# Patient Record
Sex: FEMALE | Race: ASIAN | NOT HISPANIC OR LATINO | URBAN - METROPOLITAN AREA
[De-identification: names, ages, dates, MRNs, and addresses within clinical notes are randomized per-mention and may not be internally consistent; named-entity substitution may affect disease eponyms.]

---

## 2017-02-21 ENCOUNTER — INPATIENT (INPATIENT)
Facility: HOSPITAL | Age: 82
LOS: 30 days | Discharge: ANOTHER HOSPITAL | DRG: 949 | End: 2017-03-24
Attending: PSYCHIATRY & NEUROLOGY | Admitting: PSYCHIATRY & NEUROLOGY
Payer: MEDICARE

## 2017-02-21 DIAGNOSIS — I69.351 HEMIPLEGIA AND HEMIPARESIS FOLLOWING CEREBRAL INFARCTION AFFECTING RIGHT DOMINANT SIDE: ICD-10-CM

## 2017-02-21 DIAGNOSIS — R26.9 UNSPECIFIED ABNORMALITIES OF GAIT AND MOBILITY: ICD-10-CM

## 2017-02-21 DIAGNOSIS — T42.6X5A ADVERSE EFFECT OF OTHER ANTIEPILEPTIC AND SEDATIVE-HYPNOTIC DRUGS, INITIAL ENCOUNTER: ICD-10-CM

## 2017-02-21 DIAGNOSIS — D69.59 OTHER SECONDARY THROMBOCYTOPENIA: ICD-10-CM

## 2017-02-21 DIAGNOSIS — Z51.89 ENCOUNTER FOR OTHER SPECIFIED AFTERCARE: ICD-10-CM

## 2017-02-21 DIAGNOSIS — Z48.812 ENCOUNTER FOR SURGICAL AFTERCARE FOLLOWING SURGERY ON THE CIRCULATORY SYSTEM: ICD-10-CM

## 2017-02-21 DIAGNOSIS — I69.392 FACIAL WEAKNESS FOLLOWING CEREBRAL INFARCTION: ICD-10-CM

## 2017-02-21 DIAGNOSIS — N39.0 URINARY TRACT INFECTION, SITE NOT SPECIFIED: ICD-10-CM

## 2017-02-21 DIAGNOSIS — R94.31 ABNORMAL ELECTROCARDIOGRAM [ECG] [EKG]: ICD-10-CM

## 2017-02-21 DIAGNOSIS — K94.29 OTHER COMPLICATIONS OF GASTROSTOMY: ICD-10-CM

## 2017-02-21 DIAGNOSIS — M24.411 RECURRENT DISLOCATION, RIGHT SHOULDER: ICD-10-CM

## 2017-02-21 DIAGNOSIS — R41.4 NEUROLOGIC NEGLECT SYNDROME: ICD-10-CM

## 2017-02-21 DIAGNOSIS — I69.390 APRAXIA FOLLOWING CEREBRAL INFARCTION: ICD-10-CM

## 2017-02-21 DIAGNOSIS — I48.2 CHRONIC ATRIAL FIBRILLATION: ICD-10-CM

## 2017-02-21 DIAGNOSIS — R33.9 RETENTION OF URINE, UNSPECIFIED: ICD-10-CM

## 2017-02-21 DIAGNOSIS — I63.9 CEREBRAL INFARCTION, UNSPECIFIED: ICD-10-CM

## 2017-02-21 DIAGNOSIS — G47.00 INSOMNIA, UNSPECIFIED: ICD-10-CM

## 2017-02-21 DIAGNOSIS — Z43.1 ENCOUNTER FOR ATTENTION TO GASTROSTOMY: ICD-10-CM

## 2017-02-21 DIAGNOSIS — R41.82 ALTERED MENTAL STATUS, UNSPECIFIED: ICD-10-CM

## 2017-02-21 DIAGNOSIS — R45.87 IMPULSIVENESS: ICD-10-CM

## 2017-02-21 DIAGNOSIS — E78.5 HYPERLIPIDEMIA, UNSPECIFIED: ICD-10-CM

## 2017-02-21 DIAGNOSIS — I69.320 APHASIA FOLLOWING CEREBRAL INFARCTION: ICD-10-CM

## 2017-02-21 DIAGNOSIS — I69.391 DYSPHAGIA FOLLOWING CEREBRAL INFARCTION: ICD-10-CM

## 2017-02-21 DIAGNOSIS — I95.1 ORTHOSTATIC HYPOTENSION: ICD-10-CM

## 2017-02-21 DIAGNOSIS — I10 ESSENTIAL (PRIMARY) HYPERTENSION: ICD-10-CM
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
## 2017-02-21 DIAGNOSIS — R45.1 RESTLESSNESS AND AGITATION: ICD-10-CM
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
## 2017-02-21 DIAGNOSIS — I69.319 UNSPECIFIED SYMPTOMS AND SIGNS INVOLVING COGNITIVE FUNCTIONS FOLLOWING CEREBRAL INFARCTION: ICD-10-CM

## 2017-02-21 PROCEDURE — 93010 ELECTROCARDIOGRAM REPORT: CPT

## 2017-02-22 PROCEDURE — 93010 ELECTROCARDIOGRAM REPORT: CPT

## 2017-02-22 PROCEDURE — 99223 1ST HOSP IP/OBS HIGH 75: CPT

## 2017-02-23 PROCEDURE — 90792 PSYCH DIAG EVAL W/MED SRVCS: CPT

## 2017-02-23 PROCEDURE — 99233 SBSQ HOSP IP/OBS HIGH 50: CPT

## 2017-02-24 PROCEDURE — 99233 SBSQ HOSP IP/OBS HIGH 50: CPT

## 2017-02-25 PROCEDURE — 99233 SBSQ HOSP IP/OBS HIGH 50: CPT

## 2017-02-26 PROCEDURE — 99233 SBSQ HOSP IP/OBS HIGH 50: CPT

## 2017-02-27 PROCEDURE — 99233 SBSQ HOSP IP/OBS HIGH 50: CPT

## 2017-02-27 PROCEDURE — 99232 SBSQ HOSP IP/OBS MODERATE 35: CPT

## 2017-02-28 PROCEDURE — 99233 SBSQ HOSP IP/OBS HIGH 50: CPT

## 2017-03-01 PROCEDURE — 99233 SBSQ HOSP IP/OBS HIGH 50: CPT

## 2017-03-01 PROCEDURE — 49465 FLUORO EXAM OF G/COLON TUBE: CPT

## 2017-03-02 PROCEDURE — 99233 SBSQ HOSP IP/OBS HIGH 50: CPT

## 2017-03-03 PROCEDURE — 70450 CT HEAD/BRAIN W/O DYE: CPT | Mod: 26

## 2017-03-03 PROCEDURE — 99233 SBSQ HOSP IP/OBS HIGH 50: CPT

## 2017-03-04 PROCEDURE — 70450 CT HEAD/BRAIN W/O DYE: CPT | Mod: 26

## 2017-03-04 PROCEDURE — 99232 SBSQ HOSP IP/OBS MODERATE 35: CPT

## 2017-03-05 PROCEDURE — 99232 SBSQ HOSP IP/OBS MODERATE 35: CPT

## 2017-03-06 PROCEDURE — 99232 SBSQ HOSP IP/OBS MODERATE 35: CPT

## 2017-03-07 PROCEDURE — 99232 SBSQ HOSP IP/OBS MODERATE 35: CPT

## 2017-03-07 PROCEDURE — 99233 SBSQ HOSP IP/OBS HIGH 50: CPT

## 2017-03-08 PROCEDURE — 99232 SBSQ HOSP IP/OBS MODERATE 35: CPT

## 2017-03-08 PROCEDURE — 99233 SBSQ HOSP IP/OBS HIGH 50: CPT

## 2017-03-08 PROCEDURE — 74230 X-RAY XM SWLNG FUNCJ C+: CPT | Mod: 26

## 2017-03-09 PROCEDURE — 99232 SBSQ HOSP IP/OBS MODERATE 35: CPT

## 2017-03-10 PROCEDURE — 99232 SBSQ HOSP IP/OBS MODERATE 35: CPT

## 2017-03-11 PROCEDURE — 99232 SBSQ HOSP IP/OBS MODERATE 35: CPT

## 2017-03-12 PROCEDURE — 99232 SBSQ HOSP IP/OBS MODERATE 35: CPT

## 2017-03-13 PROCEDURE — 99232 SBSQ HOSP IP/OBS MODERATE 35: CPT

## 2017-03-13 PROCEDURE — 99233 SBSQ HOSP IP/OBS HIGH 50: CPT

## 2017-03-14 PROCEDURE — 99232 SBSQ HOSP IP/OBS MODERATE 35: CPT

## 2017-03-14 PROCEDURE — 99233 SBSQ HOSP IP/OBS HIGH 50: CPT

## 2017-03-15 PROCEDURE — 99233 SBSQ HOSP IP/OBS HIGH 50: CPT

## 2017-03-15 PROCEDURE — 99232 SBSQ HOSP IP/OBS MODERATE 35: CPT

## 2017-03-16 PROCEDURE — 99232 SBSQ HOSP IP/OBS MODERATE 35: CPT

## 2017-03-16 PROCEDURE — 99233 SBSQ HOSP IP/OBS HIGH 50: CPT

## 2017-03-17 PROCEDURE — 99233 SBSQ HOSP IP/OBS HIGH 50: CPT

## 2017-03-17 PROCEDURE — 99232 SBSQ HOSP IP/OBS MODERATE 35: CPT

## 2017-03-18 PROCEDURE — 99232 SBSQ HOSP IP/OBS MODERATE 35: CPT

## 2017-03-18 PROCEDURE — 99233 SBSQ HOSP IP/OBS HIGH 50: CPT

## 2017-03-19 PROCEDURE — 99232 SBSQ HOSP IP/OBS MODERATE 35: CPT

## 2017-03-19 PROCEDURE — 99233 SBSQ HOSP IP/OBS HIGH 50: CPT

## 2017-03-20 PROCEDURE — 99233 SBSQ HOSP IP/OBS HIGH 50: CPT

## 2017-03-20 PROCEDURE — 99232 SBSQ HOSP IP/OBS MODERATE 35: CPT

## 2017-03-21 PROCEDURE — 99232 SBSQ HOSP IP/OBS MODERATE 35: CPT

## 2017-03-21 PROCEDURE — 99233 SBSQ HOSP IP/OBS HIGH 50: CPT

## 2017-03-22 PROCEDURE — 99232 SBSQ HOSP IP/OBS MODERATE 35: CPT

## 2017-03-22 PROCEDURE — 99233 SBSQ HOSP IP/OBS HIGH 50: CPT

## 2017-03-23 PROCEDURE — 99232 SBSQ HOSP IP/OBS MODERATE 35: CPT

## 2017-03-24 ENCOUNTER — INPATIENT (INPATIENT)
Facility: HOSPITAL | Age: 82
LOS: 4 days | Discharge: HOME | DRG: 57 | End: 2017-03-29
Attending: HOSPITALIST | Admitting: HOSPITALIST
Payer: MEDICARE

## 2017-03-24 DIAGNOSIS — F91.9 CONDUCT DISORDER, UNSPECIFIED: ICD-10-CM

## 2017-03-24 DIAGNOSIS — I69.398 OTHER SEQUELAE OF CEREBRAL INFARCTION: ICD-10-CM

## 2017-03-24 DIAGNOSIS — I10 ESSENTIAL (PRIMARY) HYPERTENSION: ICD-10-CM

## 2017-03-24 DIAGNOSIS — Z78.1 PHYSICAL RESTRAINT STATUS: ICD-10-CM

## 2017-03-24 DIAGNOSIS — R13.10 DYSPHAGIA, UNSPECIFIED: ICD-10-CM

## 2017-03-24 DIAGNOSIS — Z79.01 LONG TERM (CURRENT) USE OF ANTICOAGULANTS: ICD-10-CM

## 2017-03-24 DIAGNOSIS — Z93.1 GASTROSTOMY STATUS: ICD-10-CM

## 2017-03-24 DIAGNOSIS — I69.351 HEMIPLEGIA AND HEMIPARESIS FOLLOWING CEREBRAL INFARCTION AFFECTING RIGHT DOMINANT SIDE: ICD-10-CM

## 2017-03-24 DIAGNOSIS — E78.5 HYPERLIPIDEMIA, UNSPECIFIED: ICD-10-CM

## 2017-03-24 DIAGNOSIS — E87.2 ACIDOSIS: ICD-10-CM

## 2017-03-24 DIAGNOSIS — Z79.52 LONG TERM (CURRENT) USE OF SYSTEMIC STEROIDS: ICD-10-CM

## 2017-03-24 DIAGNOSIS — R40.20 UNSPECIFIED COMA: ICD-10-CM

## 2017-03-24 DIAGNOSIS — G40.909 EPILEPSY, UNSPECIFIED, NOT INTRACTABLE, WITHOUT STATUS EPILEPTICUS: ICD-10-CM

## 2017-03-24 DIAGNOSIS — F05 DELIRIUM DUE TO KNOWN PHYSIOLOGICAL CONDITION: ICD-10-CM

## 2017-03-24 DIAGNOSIS — I69.391 DYSPHAGIA FOLLOWING CEREBRAL INFARCTION: ICD-10-CM

## 2017-03-24 DIAGNOSIS — I69.320 APHASIA FOLLOWING CEREBRAL INFARCTION: ICD-10-CM

## 2017-03-24 DIAGNOSIS — I48.0 PAROXYSMAL ATRIAL FIBRILLATION: ICD-10-CM

## 2017-03-24 PROCEDURE — 49465 FLUORO EXAM OF G/COLON TUBE: CPT

## 2017-03-24 PROCEDURE — 85652 RBC SED RATE AUTOMATED: CPT

## 2017-03-24 PROCEDURE — 82607 VITAMIN B-12: CPT

## 2017-03-24 PROCEDURE — 99232 SBSQ HOSP IP/OBS MODERATE 35: CPT

## 2017-03-24 PROCEDURE — 70450 CT HEAD/BRAIN W/O DYE: CPT

## 2017-03-24 PROCEDURE — 83036 HEMOGLOBIN GLYCOSYLATED A1C: CPT

## 2017-03-24 PROCEDURE — 84484 ASSAY OF TROPONIN QUANT: CPT

## 2017-03-24 PROCEDURE — 97760 ORTHOTIC MGMT&TRAING 1ST ENC: CPT

## 2017-03-24 PROCEDURE — 80048 BASIC METABOLIC PNL TOTAL CA: CPT

## 2017-03-24 PROCEDURE — 87086 URINE CULTURE/COLONY COUNT: CPT

## 2017-03-24 PROCEDURE — 86140 C-REACTIVE PROTEIN: CPT

## 2017-03-24 PROCEDURE — 84146 ASSAY OF PROLACTIN: CPT

## 2017-03-24 PROCEDURE — 92507 TX SP LANG VOICE COMM INDIV: CPT

## 2017-03-24 PROCEDURE — 80164 ASSAY DIPROPYLACETIC ACD TOT: CPT

## 2017-03-24 PROCEDURE — 86038 ANTINUCLEAR ANTIBODIES: CPT

## 2017-03-24 PROCEDURE — 81003 URINALYSIS AUTO W/O SCOPE: CPT

## 2017-03-24 PROCEDURE — 97167 OT EVAL HIGH COMPLEX 60 MIN: CPT

## 2017-03-24 PROCEDURE — 70450 CT HEAD/BRAIN W/O DYE: CPT | Mod: 26

## 2017-03-24 PROCEDURE — 93005 ELECTROCARDIOGRAM TRACING: CPT

## 2017-03-24 PROCEDURE — 97110 THERAPEUTIC EXERCISES: CPT

## 2017-03-24 PROCEDURE — 97116 GAIT TRAINING THERAPY: CPT

## 2017-03-24 PROCEDURE — 74230 X-RAY XM SWLNG FUNCJ C+: CPT

## 2017-03-24 PROCEDURE — 97535 SELF CARE MNGMENT TRAINING: CPT

## 2017-03-24 PROCEDURE — 97140 MANUAL THERAPY 1/> REGIONS: CPT

## 2017-03-24 PROCEDURE — 99233 SBSQ HOSP IP/OBS HIGH 50: CPT

## 2017-03-24 PROCEDURE — 85027 COMPLETE CBC AUTOMATED: CPT

## 2017-03-24 PROCEDURE — 97112 NEUROMUSCULAR REEDUCATION: CPT

## 2017-03-24 PROCEDURE — 92611 MOTION FLUOROSCOPY/SWALLOW: CPT

## 2017-03-24 PROCEDURE — 80162 ASSAY OF DIGOXIN TOTAL: CPT

## 2017-03-24 PROCEDURE — 99291 CRITICAL CARE FIRST HOUR: CPT

## 2017-03-24 PROCEDURE — 93010 ELECTROCARDIOGRAM REPORT: CPT

## 2017-03-24 PROCEDURE — 80053 COMPREHEN METABOLIC PANEL: CPT

## 2017-03-24 PROCEDURE — 83605 ASSAY OF LACTIC ACID: CPT

## 2017-03-24 PROCEDURE — 92526 ORAL FUNCTION THERAPY: CPT

## 2017-03-24 PROCEDURE — 80069 RENAL FUNCTION PANEL: CPT

## 2017-03-24 PROCEDURE — 84100 ASSAY OF PHOSPHORUS: CPT

## 2017-03-24 PROCEDURE — C1889: CPT

## 2017-03-24 PROCEDURE — 85025 COMPLETE CBC W/AUTO DIFF WBC: CPT

## 2017-03-24 PROCEDURE — 80076 HEPATIC FUNCTION PANEL: CPT

## 2017-03-24 PROCEDURE — 97530 THERAPEUTIC ACTIVITIES: CPT

## 2017-03-24 PROCEDURE — 82608 B-12 BINDING CAPACITY: CPT

## 2017-03-24 PROCEDURE — 84132 ASSAY OF SERUM POTASSIUM: CPT

## 2017-03-24 PROCEDURE — 82746 ASSAY OF FOLIC ACID SERUM: CPT

## 2017-03-24 PROCEDURE — 97163 PT EVAL HIGH COMPLEX 45 MIN: CPT

## 2017-03-24 PROCEDURE — 93010 ELECTROCARDIOGRAM REPORT: CPT | Mod: 76

## 2017-03-24 PROCEDURE — 83735 ASSAY OF MAGNESIUM: CPT

## 2017-03-24 PROCEDURE — 97124 MASSAGE THERAPY: CPT

## 2017-03-25 PROCEDURE — 99233 SBSQ HOSP IP/OBS HIGH 50: CPT

## 2017-03-26 PROCEDURE — 99233 SBSQ HOSP IP/OBS HIGH 50: CPT

## 2017-03-27 PROCEDURE — 99233 SBSQ HOSP IP/OBS HIGH 50: CPT

## 2017-03-28 PROCEDURE — 99233 SBSQ HOSP IP/OBS HIGH 50: CPT

## 2017-03-29 PROCEDURE — 83605 ASSAY OF LACTIC ACID: CPT

## 2017-03-29 PROCEDURE — 85025 COMPLETE CBC W/AUTO DIFF WBC: CPT

## 2017-03-29 PROCEDURE — 80069 RENAL FUNCTION PANEL: CPT

## 2017-03-29 PROCEDURE — 80164 ASSAY DIPROPYLACETIC ACD TOT: CPT

## 2017-03-29 PROCEDURE — 97161 PT EVAL LOW COMPLEX 20 MIN: CPT

## 2017-03-29 PROCEDURE — 93005 ELECTROCARDIOGRAM TRACING: CPT

## 2017-03-29 PROCEDURE — 85027 COMPLETE CBC AUTOMATED: CPT

## 2017-03-29 PROCEDURE — 99239 HOSP IP/OBS DSCHRG MGMT >30: CPT

## 2017-03-29 PROCEDURE — 84484 ASSAY OF TROPONIN QUANT: CPT

## 2017-03-29 PROCEDURE — 80048 BASIC METABOLIC PNL TOTAL CA: CPT

## 2017-03-29 PROCEDURE — 95812 EEG 41-60 MINUTES: CPT

## 2018-05-13 ENCOUNTER — INPATIENT (INPATIENT)
Facility: HOSPITAL | Age: 83
LOS: 2 days | Discharge: HOME | DRG: 394 | End: 2018-05-16
Attending: INTERNAL MEDICINE | Admitting: INTERNAL MEDICINE
Payer: MEDICARE

## 2018-05-13 VITALS
TEMPERATURE: 98 F | WEIGHT: 100.09 LBS | RESPIRATION RATE: 16 BRPM | SYSTOLIC BLOOD PRESSURE: 126 MMHG | DIASTOLIC BLOOD PRESSURE: 77 MMHG | HEART RATE: 75 BPM | OXYGEN SATURATION: 97 %

## 2018-05-13 DIAGNOSIS — K94.23 GASTROSTOMY MALFUNCTION: ICD-10-CM

## 2018-05-13 DIAGNOSIS — I48.91 UNSPECIFIED ATRIAL FIBRILLATION: ICD-10-CM

## 2018-05-13 DIAGNOSIS — I63.9 CEREBRAL INFARCTION, UNSPECIFIED: ICD-10-CM

## 2018-05-13 LAB
ALBUMIN SERPL ELPH-MCNC: 4 G/DL — SIGNIFICANT CHANGE UP (ref 3.3–5)
ALP SERPL-CCNC: 54 U/L — SIGNIFICANT CHANGE UP (ref 40–120)
ALT FLD-CCNC: 33 U/L — SIGNIFICANT CHANGE UP (ref 10–45)
ANION GAP SERPL CALC-SCNC: 12 MMOL/L — SIGNIFICANT CHANGE UP (ref 5–17)
ANION GAP SERPL CALC-SCNC: 14 MMOL/L — SIGNIFICANT CHANGE UP (ref 5–17)
APTT BLD: 31.5 SEC — SIGNIFICANT CHANGE UP (ref 27.5–37.4)
AST SERPL-CCNC: 47 U/L — HIGH (ref 10–40)
BASOPHILS # BLD AUTO: 0 K/UL — SIGNIFICANT CHANGE UP (ref 0–0.2)
BASOPHILS NFR BLD AUTO: 0.3 % — SIGNIFICANT CHANGE UP (ref 0–2)
BILIRUB SERPL-MCNC: 0.3 MG/DL — SIGNIFICANT CHANGE UP (ref 0.2–1.2)
BLD GP AB SCN SERPL QL: NEGATIVE — SIGNIFICANT CHANGE UP
BUN SERPL-MCNC: 16 MG/DL — SIGNIFICANT CHANGE UP (ref 7–23)
BUN SERPL-MCNC: 17 MG/DL — SIGNIFICANT CHANGE UP (ref 7–23)
CALCIUM SERPL-MCNC: 9.2 MG/DL — SIGNIFICANT CHANGE UP (ref 8.4–10.5)
CALCIUM SERPL-MCNC: 9.6 MG/DL — SIGNIFICANT CHANGE UP (ref 8.4–10.5)
CHLORIDE SERPL-SCNC: 100 MMOL/L — SIGNIFICANT CHANGE UP (ref 96–108)
CHLORIDE SERPL-SCNC: 100 MMOL/L — SIGNIFICANT CHANGE UP (ref 96–108)
CO2 SERPL-SCNC: 25 MMOL/L — SIGNIFICANT CHANGE UP (ref 22–31)
CO2 SERPL-SCNC: 29 MMOL/L — SIGNIFICANT CHANGE UP (ref 22–31)
CREAT SERPL-MCNC: 0.45 MG/DL — LOW (ref 0.5–1.3)
CREAT SERPL-MCNC: 0.48 MG/DL — LOW (ref 0.5–1.3)
EOSINOPHIL # BLD AUTO: 0.1 K/UL — SIGNIFICANT CHANGE UP (ref 0–0.5)
EOSINOPHIL NFR BLD AUTO: 0.7 % — SIGNIFICANT CHANGE UP (ref 0–6)
GLUCOSE SERPL-MCNC: 113 MG/DL — HIGH (ref 70–99)
GLUCOSE SERPL-MCNC: 115 MG/DL — HIGH (ref 70–99)
HCT VFR BLD CALC: 43.6 % — SIGNIFICANT CHANGE UP (ref 34.5–45)
HGB BLD-MCNC: 14.7 G/DL — SIGNIFICANT CHANGE UP (ref 11.5–15.5)
LYMPHOCYTES # BLD AUTO: 2.2 K/UL — SIGNIFICANT CHANGE UP (ref 1–3.3)
LYMPHOCYTES # BLD AUTO: 26.9 % — SIGNIFICANT CHANGE UP (ref 13–44)
MCHC RBC-ENTMCNC: 33.3 PG — SIGNIFICANT CHANGE UP (ref 27–34)
MCHC RBC-ENTMCNC: 33.8 GM/DL — SIGNIFICANT CHANGE UP (ref 32–36)
MCV RBC AUTO: 98.6 FL — SIGNIFICANT CHANGE UP (ref 80–100)
MONOCYTES # BLD AUTO: 0.4 K/UL — SIGNIFICANT CHANGE UP (ref 0–0.9)
MONOCYTES NFR BLD AUTO: 4.8 % — SIGNIFICANT CHANGE UP (ref 2–14)
NEUTROPHILS # BLD AUTO: 5.4 K/UL — SIGNIFICANT CHANGE UP (ref 1.8–7.4)
NEUTROPHILS NFR BLD AUTO: 67.2 % — SIGNIFICANT CHANGE UP (ref 43–77)
PLATELET # BLD AUTO: 194 K/UL — SIGNIFICANT CHANGE UP (ref 150–400)
POTASSIUM SERPL-MCNC: 4.9 MMOL/L — SIGNIFICANT CHANGE UP (ref 3.5–5.3)
POTASSIUM SERPL-MCNC: 5.8 MMOL/L — HIGH (ref 3.5–5.3)
POTASSIUM SERPL-SCNC: 4.9 MMOL/L — SIGNIFICANT CHANGE UP (ref 3.5–5.3)
POTASSIUM SERPL-SCNC: 5.8 MMOL/L — HIGH (ref 3.5–5.3)
PROT SERPL-MCNC: 7.8 G/DL — SIGNIFICANT CHANGE UP (ref 6–8.3)
RBC # BLD: 4.42 M/UL — SIGNIFICANT CHANGE UP (ref 3.8–5.2)
RBC # FLD: 11.1 % — SIGNIFICANT CHANGE UP (ref 10.3–14.5)
RH IG SCN BLD-IMP: POSITIVE — SIGNIFICANT CHANGE UP
RH IG SCN BLD-IMP: POSITIVE — SIGNIFICANT CHANGE UP
SODIUM SERPL-SCNC: 139 MMOL/L — SIGNIFICANT CHANGE UP (ref 135–145)
SODIUM SERPL-SCNC: 141 MMOL/L — SIGNIFICANT CHANGE UP (ref 135–145)
WBC # BLD: 8 K/UL — SIGNIFICANT CHANGE UP (ref 3.8–10.5)
WBC # FLD AUTO: 8 K/UL — SIGNIFICANT CHANGE UP (ref 3.8–10.5)

## 2018-05-13 PROCEDURE — 99284 EMERGENCY DEPT VISIT MOD MDM: CPT

## 2018-05-13 RX ORDER — SODIUM CHLORIDE 9 MG/ML
1000 INJECTION INTRAMUSCULAR; INTRAVENOUS; SUBCUTANEOUS ONCE
Qty: 0 | Refills: 0 | Status: COMPLETED | OUTPATIENT
Start: 2018-05-13 | End: 2018-05-13

## 2018-05-13 RX ORDER — HEPARIN SODIUM 5000 [USP'U]/ML
5000 INJECTION INTRAVENOUS; SUBCUTANEOUS EVERY 8 HOURS
Qty: 0 | Refills: 0 | Status: DISCONTINUED | OUTPATIENT
Start: 2018-05-13 | End: 2018-05-15

## 2018-05-13 RX ORDER — VALPROIC ACID (AS SODIUM SALT) 250 MG/5ML
250 SOLUTION, ORAL ORAL ONCE
Qty: 0 | Refills: 0 | Status: COMPLETED | OUTPATIENT
Start: 2018-05-13 | End: 2018-05-13

## 2018-05-13 RX ORDER — SODIUM CHLORIDE 9 MG/ML
1000 INJECTION, SOLUTION INTRAVENOUS
Qty: 0 | Refills: 0 | Status: DISCONTINUED | OUTPATIENT
Start: 2018-05-13 | End: 2018-05-16

## 2018-05-13 RX ADMIN — HEPARIN SODIUM 5000 UNIT(S): 5000 INJECTION INTRAVENOUS; SUBCUTANEOUS at 21:27

## 2018-05-13 RX ADMIN — SODIUM CHLORIDE 1000 MILLILITER(S): 9 INJECTION INTRAMUSCULAR; INTRAVENOUS; SUBCUTANEOUS at 13:07

## 2018-05-13 RX ADMIN — Medication 26.25 MILLIGRAM(S): at 22:25

## 2018-05-13 RX ADMIN — SODIUM CHLORIDE 50 MILLILITER(S): 9 INJECTION, SOLUTION INTRAVENOUS at 17:44

## 2018-05-13 RX ADMIN — Medication 1 MILLIGRAM(S): at 21:26

## 2018-05-13 NOTE — H&P ADULT - NSHPREVIEWOFSYSTEMS_GEN_ALL_CORE
As per family  Gen: no loss of wt   Resp: No cough no sputum production  CVS: No apparent chest pain no palpitations no orthopnea  GI: no N/V/D  : no dysuria, hematuria  Endo: no polyuria no excessive sweating  Neuro: No new changes or weakness  Heme: No petechiae no easy bruising  Msk: No joint  swelling  Skin: No rash no itching

## 2018-05-13 NOTE — ED PROVIDER NOTE - OBJECTIVE STATEMENT
81 yo female with PMH of CVA and PEG tube 2/2 dysphagia presents to the ED for pulled out PEG tube. Family at bedside indicates that patient becomes intermittently confused and pulls on her PEG tube. PEG tube last seen in by family at 7pm last night. Today, found to have PEG tube out and balloon was inflated upon self-extrication of the tube.

## 2018-05-13 NOTE — ED PROVIDER NOTE - ATTENDING CONTRIBUTION TO CARE
83 y/o f with pmhx CVA, dementia, s/p PEG tube done in Sept, presents for PEG tube dislodged this morning at around 7 am. Daughter and son at the bedside. no fever no chills. Last feeding was last night at around 8 pm. no vomiting. no diarrhea. no urinary complaints. patient unable to offer complaints given her underlying diagnosis. PEG was placed at [facility].  no acute distress.   HEENT:  Perrl  Lungs:  ctab/l   CVS: S1S2   Abd;  soft non tender, PEG tube site, + tissue approx 2 cm at the opening. non active bleeding.   Ext: no edema  Neuro: awake, alert, not verbal. follows some commands.   MSK: 5/5 x4

## 2018-05-13 NOTE — H&P ADULT - NSHPPHYSICALEXAM_GEN_ALL_CORE
PHYSICAL EXAM: vital signs as above  in no apparent distress confused (baseline per family)  HEENT: POLI EOMI  Neck: Supple, no JVD, no thyromegaly  Lungs: no rhonchi, no wheeze, no crackles  CVS: S1 S2 no M/R/G  Abdomen: no tenderness, no organomegaly, BS present  PEG site clean no cellulitis or drainage  Neuro: Alert, right hemiplegia  Psych: appropriate affect  Skin: warm, dry  Ext: no cyanosis or clubbing, no edema  Msk: no joint swelling or deformities  Back: no CVA tenderness, no kyphosis/scoliosis

## 2018-05-13 NOTE — CONSULT NOTE ADULT - ASSESSMENT
Impression:  1. Dislodged PEG, unable to pass balloon gastrostomy tube at bedside  2. CVA s/p PEG, on Eliquis    Plan:  - NGT may be placed temporarily for nutrition and medications; discussed with family at bedside  - If anticoagulation is required, would prefer heparin drip rather than Eliquis while planning for procedures  - Will discuss timing of EGD/PEG with GI attending, likely Tuesday at the earliest; discussed with Hospitalist and patient's family  - Supportive care per primary team

## 2018-05-13 NOTE — H&P ADULT - PROBLEM SELECTOR PLAN 3
stroke prophylaxis by Eliquis  no modality of feeding at this time   will hold all meds for now  risk of another CVA in next 24 hrs very low and I do not want to use enoxaparin in case GI need to place an entirely new PEG tube

## 2018-05-13 NOTE — CONSULT NOTE ADULT - SUBJECTIVE AND OBJECTIVE BOX
Chief Complaint:  Patient is a 82y old  Female who presents with a chief complaint of dislodged PEG (13 May 2018 13:53)      HPI: 82F with CVA on Eliquis and history of PEG presents to ED with dislodged PEG early this morning. Family at bedside report that it was initially placed in September 2017 - unclear if it was placed by IR/GI/Surgery. Several attempts made my ED to pass a balloon tube through were unsuccessful. GI consulted for further management. Additional attempt at passing balloon tube unsuccessful.    Allergies:  No Known Allergies      Home Medications:  Ativan 1 mg oral tablet: 1 tab(s) orally once a day (at bedtime) (13 May 2018 13:30)  digoxin 125 mcg (0.125 mg) oral tablet: 1 tab(s) orally once a day (13 May 2018 13:30)  Eliquis 5 mg oral tablet: 1 tab(s) orally 2 times a day (13 May 2018 13:30)  gabapentin 100 mg oral capsule: 1 cap(s) orally 3 times a day (13 May 2018 13:30)  traZODone 50 mg oral tablet: 1 tab(s) orally once a day (at bedtime) (13 May 2018 13:30)  valproic acid 250 mg/5 mL oral syrup: 5 milliliter(s) orally once a day (13 May 2018 13:30)  Zocor 40 mg oral tablet: 1 tab(s) orally once a day (at bedtime) (13 May 2018 13:30)      Hospital Medications:  dextrose 5% + sodium chloride 0.9%. 1000 milliLiter(s) IV Continuous <Continuous>  heparin  Injectable 5000 Unit(s) SubCutaneous every 8 hours      PMHX/PSHX:  PEG tube malfunction  CVA (cerebral vascular accident)  No significant past surgical history      Family history:  No pertinent family history in first degree relatives      Social History: As above    Review of systems: Negative, except as otherwise noted above      PHYSICAL EXAM:   Vital Signs:  Vital Signs Last 24 Hrs  T(C): 36.9 (13 May 2018 17:22), Max: 36.9 (13 May 2018 08:40)  T(F): 98.5 (13 May 2018 17:22), Max: 98.5 (13 May 2018 08:40)  HR: 80 (13 May 2018 17:22) (75 - 91)  BP: 153/83 (13 May 2018 17:22) (126/77 - 157/94)  BP(mean): --  RR: 19 (13 May 2018 17:22) (15 - 19)  SpO2: 100% (13 May 2018 17:22) (97% - 100%)  Daily     Daily     GENERAL:  No acute distress  HEENT:  Anicteric, no thrush  CHEST:  Non-labored breathing, lungs clear b/l  HEART:  +s1, s2 heart sounds, no murmurs  ABDOMEN:  +Prior gastrostomy site with pink/red tissue, no oozing or surrounding erythema, abdomen soft, nontender, no rebound, no guarding  EXTREMITIES:  warm and well perfused, no edema  SKIN:  No rash  NEURO:  Awake        LABS:  CBC Full  -  ( 13 May 2018 11:03 )  WBC Count : 8.0 K/uL  Hemoglobin : 14.7 g/dL  Hematocrit : 43.6 %  Platelet Count - Automated : 194 K/uL  Mean Cell Volume : 98.6 fl  Auto Neutrophil # : 5.4 K/uL  Auto Neutrophil % : 67.2 %    05-13 @ 12:19  Na 139 mmol/L  K 4.9 mmol/L  Cl 100 mmol/L  CO2 25 mmol/L  BUN 16 mg/dL  Creat 0.45 mg/dL  Glucose 113 mg/dL  Ca 9.2 mg/dL    Total protein --  Albumin --  T bili --  Alk phos --  AST --  ALT --    PTT - ( 13 May 2018 11:03 )  PTT:31.5 sec      Imaging:

## 2018-05-13 NOTE — CHART NOTE - NSCHARTNOTEFT_GEN_A_CORE
MEDICINE [facility]  Patient is an 82 year old female who presents with a chief complaint of dislodged PEG. (13 May 2018 13:53)       Event Summary:  Called by RN to report patient's family concerned that patient is not scheduled to receive Valproic acid and Ativan this evening.  Of note, patient is admitted for dislodged PEG tube which will be replaced by GI in AM and cannot take anything by mouth.      HPI:  Patient is an 82 year old female with a PMHx of CVA and PEG tube (secondary to dysphagia) who presented for pulled out PEG tube.(13 May 2018 13:53)      PLAN: Medications  - Discussed with Dr. Sharma - no contraindications to ordering IV Valproic acid and IV Ativan  - Discussed with pharmacy to confirm PO to IV conversions  - Will continue to monitor closely  - Primary team to follow up in AM      #[id number]  Sheila Patel PA-C  Medicine Department MEDICINE [facility]  Patient is an 82 year old female who presents with a chief complaint of dislodged PEG. (13 May 2018 13:53)       Event Summary:  Called by RN to report patient's family concerned that patient is not scheduled to receive Valproic acid and Ativan this evening.  Of note, patient is admitted for dislodged PEG tube which will be replaced by GI in AM and cannot take anything by mouth.      HPI:  Patient is an 82 year old female with a PMHx of CVA and PEG tube (secondary to dysphagia) who presented for pulled out PEG tube. (13 May 2018 13:53)      PLAN: Medications  - Discussed with Dr. Sharma - no contraindications to ordering IV Valproic acid and IV Ativan  - Discussed with pharmacy to confirm PO to IV conversions  - Will continue to monitor closely  - Primary team to follow up in AM      #[id number]  Sheila Patel PA-C  Medicine Department

## 2018-05-13 NOTE — ED PROVIDER NOTE - PHYSICAL EXAMINATION
GI: Peg tube site with small herniated blood tinged contents; Unable to replace PEG tube at bedside because tube will not pass; abd non-tender, no guarding or rebound or distension or ecchymosis

## 2018-05-13 NOTE — H&P ADULT - HISTORY OF PRESENT ILLNESS
81 yo female with PMH of CVA and PEG tube 2/2 dysphagia presents to the ED for pulled out PEG tube. Family at bedside indicates that patient becomes intermittently confused and pulls on her PEG tube. PEG tube last seen in by family at 7pm last night. Today, found to have PEG tube out and balloon was inflated upon self-extrication of the tube. The granddaughter states that the family tries to put mittens on the patient at night but somehow she pulled the tube out.

## 2018-05-13 NOTE — ED PROVIDER NOTE - PROGRESS NOTE DETAILS
ATTG: : patient's family brought the old tube in, a 16 fr tube with balloon still inflated. Unable to pass PEG tube secondary to tissue obstructing, GI consulted, awaiting response and recommendations. fam aware. GI called back, will admit for pt to have PEG replaced endoscopically; Paged unattached doc of the day, Dr. Sharma for admission. Labs pending, will admit after labs return.

## 2018-05-13 NOTE — ED ADULT NURSE NOTE - OBJECTIVE STATEMENT
82 yr old female with multiple medical history , present to the ER for pulling out her peg tube. Pt is confused at baseline and as per family member she pulled out her feeding tube this morning. Pt is in no apparent distress at this time. Side rails x2 up for safety.

## 2018-05-14 LAB
ANION GAP SERPL CALC-SCNC: 14 MMOL/L — SIGNIFICANT CHANGE UP (ref 5–17)
BUN SERPL-MCNC: 7 MG/DL — SIGNIFICANT CHANGE UP (ref 7–23)
CALCIUM SERPL-MCNC: 9.1 MG/DL — SIGNIFICANT CHANGE UP (ref 8.4–10.5)
CHLORIDE SERPL-SCNC: 103 MMOL/L — SIGNIFICANT CHANGE UP (ref 96–108)
CO2 SERPL-SCNC: 23 MMOL/L — SIGNIFICANT CHANGE UP (ref 22–31)
CREAT SERPL-MCNC: 0.47 MG/DL — LOW (ref 0.5–1.3)
GLUCOSE SERPL-MCNC: 370 MG/DL — HIGH (ref 70–99)
HCT VFR BLD CALC: 47.8 % — HIGH (ref 34.5–45)
HGB BLD-MCNC: 16.1 G/DL — HIGH (ref 11.5–15.5)
INR BLD: 1.08 RATIO — SIGNIFICANT CHANGE UP (ref 0.88–1.16)
MCHC RBC-ENTMCNC: 33.1 PG — SIGNIFICANT CHANGE UP (ref 27–34)
MCHC RBC-ENTMCNC: 33.8 GM/DL — SIGNIFICANT CHANGE UP (ref 32–36)
MCV RBC AUTO: 97.9 FL — SIGNIFICANT CHANGE UP (ref 80–100)
PLATELET # BLD AUTO: 234 K/UL — SIGNIFICANT CHANGE UP (ref 150–400)
POTASSIUM SERPL-MCNC: 4 MMOL/L — SIGNIFICANT CHANGE UP (ref 3.5–5.3)
POTASSIUM SERPL-SCNC: 4 MMOL/L — SIGNIFICANT CHANGE UP (ref 3.5–5.3)
PROTHROM AB SERPL-ACNC: 11.7 SEC — SIGNIFICANT CHANGE UP (ref 9.8–12.7)
RBC # BLD: 4.88 M/UL — SIGNIFICANT CHANGE UP (ref 3.8–5.2)
RBC # FLD: 11.1 % — SIGNIFICANT CHANGE UP (ref 10.3–14.5)
SODIUM SERPL-SCNC: 140 MMOL/L — SIGNIFICANT CHANGE UP (ref 135–145)
TSH SERPL-MCNC: 2.44 UIU/ML — SIGNIFICANT CHANGE UP (ref 0.27–4.2)
WBC # BLD: 8 K/UL — SIGNIFICANT CHANGE UP (ref 3.8–10.5)
WBC # FLD AUTO: 8 K/UL — SIGNIFICANT CHANGE UP (ref 3.8–10.5)

## 2018-05-14 RX ADMIN — Medication 0.5 MILLIGRAM(S): at 22:37

## 2018-05-14 RX ADMIN — HEPARIN SODIUM 5000 UNIT(S): 5000 INJECTION INTRAVENOUS; SUBCUTANEOUS at 05:10

## 2018-05-14 RX ADMIN — HEPARIN SODIUM 5000 UNIT(S): 5000 INJECTION INTRAVENOUS; SUBCUTANEOUS at 21:31

## 2018-05-14 RX ADMIN — HEPARIN SODIUM 5000 UNIT(S): 5000 INJECTION INTRAVENOUS; SUBCUTANEOUS at 13:23

## 2018-05-15 DIAGNOSIS — F01.51 VASCULAR DEMENTIA, UNSPECIFIED SEVERITY, WITH BEHAVIORAL DISTURBANCE: ICD-10-CM

## 2018-05-15 PROCEDURE — 90792 PSYCH DIAG EVAL W/MED SRVCS: CPT

## 2018-05-15 PROCEDURE — 43246 EGD PLACE GASTROSTOMY TUBE: CPT | Mod: GC

## 2018-05-15 RX ORDER — DIGOXIN 250 MCG
0.12 TABLET ORAL DAILY
Qty: 0 | Refills: 0 | Status: DISCONTINUED | OUTPATIENT
Start: 2018-05-15 | End: 2018-05-16

## 2018-05-15 RX ORDER — TRAZODONE HCL 50 MG
50 TABLET ORAL AT BEDTIME
Qty: 0 | Refills: 0 | Status: DISCONTINUED | OUTPATIENT
Start: 2018-05-15 | End: 2018-05-16

## 2018-05-15 RX ORDER — SIMVASTATIN 20 MG/1
40 TABLET, FILM COATED ORAL AT BEDTIME
Qty: 0 | Refills: 0 | Status: DISCONTINUED | OUTPATIENT
Start: 2018-05-15 | End: 2018-05-16

## 2018-05-15 RX ORDER — APIXABAN 2.5 MG/1
5 TABLET, FILM COATED ORAL EVERY 12 HOURS
Qty: 0 | Refills: 0 | Status: DISCONTINUED | OUTPATIENT
Start: 2018-05-15 | End: 2018-05-16

## 2018-05-15 RX ORDER — HALOPERIDOL DECANOATE 100 MG/ML
0.5 INJECTION INTRAMUSCULAR EVERY 6 HOURS
Qty: 0 | Refills: 0 | Status: DISCONTINUED | OUTPATIENT
Start: 2018-05-15 | End: 2018-05-16

## 2018-05-15 RX ORDER — GABAPENTIN 400 MG/1
100 CAPSULE ORAL THREE TIMES A DAY
Qty: 0 | Refills: 0 | Status: DISCONTINUED | OUTPATIENT
Start: 2018-05-15 | End: 2018-05-16

## 2018-05-15 RX ORDER — VALPROIC ACID (AS SODIUM SALT) 250 MG/5ML
250 SOLUTION, ORAL ORAL DAILY
Qty: 0 | Refills: 0 | Status: DISCONTINUED | OUTPATIENT
Start: 2018-05-15 | End: 2018-05-16

## 2018-05-15 RX ADMIN — APIXABAN 5 MILLIGRAM(S): 2.5 TABLET, FILM COATED ORAL at 21:51

## 2018-05-15 RX ADMIN — GABAPENTIN 100 MILLIGRAM(S): 400 CAPSULE ORAL at 21:51

## 2018-05-15 RX ADMIN — HEPARIN SODIUM 5000 UNIT(S): 5000 INJECTION INTRAVENOUS; SUBCUTANEOUS at 05:16

## 2018-05-15 RX ADMIN — SIMVASTATIN 40 MILLIGRAM(S): 20 TABLET, FILM COATED ORAL at 21:52

## 2018-05-15 RX ADMIN — Medication 1 MILLIGRAM(S): at 23:50

## 2018-05-15 RX ADMIN — Medication 50 MILLIGRAM(S): at 21:51

## 2018-05-15 NOTE — BEHAVIORAL HEALTH ASSESSMENT NOTE - HPI (INCLUDE ILLNESS QUALITY, SEVERITY, DURATION, TIMING, CONTEXT, MODIFYING FACTORS, ASSOCIATED SIGNS AND SYMPTOMS)
83 y/o  F, former , two sons, with a history of CVA two years ago leading to poor speech and confusion that has been getting worse. No psych admissions or attempts. Consult called for pt having pulled out her PEG.    Pt herself is minimally verbal and Armenian speaking, could not use  phone. RN reports she has been consistently confused, no aggression reported.    Granddaughter Eve reports that pt is consistently confused. In the past she was on Valproic acid 10 ml daily, but this was reduced to 5ml last summer for fear of side effects. Pt was not having s/e from it at the time. The family doesn't feel that the medication significantly helped her behavior, but may have slowed deterioration. Pt also takes Ativan 1mg qhs, family doesn't like using during the day as it makes pt confused and not herself - but at night it helpful to keep pt asleep and prevent her from pulling lines. Also on Trazodone 50mg qhs for depression. Granddaughter reports pt occasionally aggressive, has a hard time understanding things or making needs known. Family is unsure they want to try more meds as they are aware that pt's deficits cannot be salvaged at this point.

## 2018-05-15 NOTE — BEHAVIORAL HEALTH ASSESSMENT NOTE - NSBHCONSULTMEDS_PSY_A_CORE FT
Continue outpt meds through PEG: Depakote 250mg/5ml - 5ml via PEG qhs. Ativan 1mg via PEG qhs, Trazodone 50g via PEG qhs

## 2018-05-15 NOTE — BEHAVIORAL HEALTH ASSESSMENT NOTE - SUMMARY
81 y/o  F, former , two sons, with a history of CVA two years ago leading to poor speech and confusion that has been getting worse. Pt has not been very verbal since her stroke, and is Nepali speaking anyway, making interview difficult. Family not sure if they want to increase Depakote, will discuss about Haldol, but as of now feel that they would prefer not to use meds. No need for 1:1 or inpt psych at present.

## 2018-05-15 NOTE — BEHAVIORAL HEALTH ASSESSMENT NOTE - NSBHCHARTREVIEWVS_PSY_A_CORE FT
Vital Signs Last 24 Hrs  T(C): 36.6 (15 May 2018 14:50), Max: 36.9 (14 May 2018 16:50)  T(F): 97.9 (15 May 2018 14:50), Max: 98.4 (14 May 2018 16:50)  HR: 89 (15 May 2018 14:50) (70 - 89)  BP: 132/80 (15 May 2018 14:50) (132/80 - 167/92)  BP(mean): --  RR: 16 (15 May 2018 14:50) (16 - 18)  SpO2: 98% (15 May 2018 14:50) (98% - 100%)

## 2018-05-16 ENCOUNTER — TRANSCRIPTION ENCOUNTER (OUTPATIENT)
Age: 83
End: 2018-05-16

## 2018-05-16 VITALS
HEART RATE: 91 BPM | RESPIRATION RATE: 18 BRPM | TEMPERATURE: 98 F | SYSTOLIC BLOOD PRESSURE: 122 MMHG | DIASTOLIC BLOOD PRESSURE: 79 MMHG | OXYGEN SATURATION: 98 %

## 2018-05-16 PROCEDURE — 86900 BLOOD TYPING SEROLOGIC ABO: CPT

## 2018-05-16 PROCEDURE — 86850 RBC ANTIBODY SCREEN: CPT

## 2018-05-16 PROCEDURE — 84443 ASSAY THYROID STIM HORMONE: CPT

## 2018-05-16 PROCEDURE — 99232 SBSQ HOSP IP/OBS MODERATE 35: CPT

## 2018-05-16 PROCEDURE — 85610 PROTHROMBIN TIME: CPT

## 2018-05-16 PROCEDURE — 85730 THROMBOPLASTIN TIME PARTIAL: CPT

## 2018-05-16 PROCEDURE — 85027 COMPLETE CBC AUTOMATED: CPT

## 2018-05-16 PROCEDURE — 99285 EMERGENCY DEPT VISIT HI MDM: CPT

## 2018-05-16 PROCEDURE — L8699: CPT

## 2018-05-16 PROCEDURE — 80048 BASIC METABOLIC PNL TOTAL CA: CPT

## 2018-05-16 PROCEDURE — 80053 COMPREHEN METABOLIC PANEL: CPT

## 2018-05-16 PROCEDURE — 86901 BLOOD TYPING SEROLOGIC RH(D): CPT

## 2018-05-16 PROCEDURE — 99231 SBSQ HOSP IP/OBS SF/LOW 25: CPT | Mod: GC

## 2018-05-16 RX ADMIN — Medication 0.12 MILLIGRAM(S): at 05:36

## 2018-05-16 RX ADMIN — GABAPENTIN 100 MILLIGRAM(S): 400 CAPSULE ORAL at 05:36

## 2018-05-16 RX ADMIN — APIXABAN 5 MILLIGRAM(S): 2.5 TABLET, FILM COATED ORAL at 09:31

## 2018-05-16 RX ADMIN — Medication 250 MILLIGRAM(S): at 15:27

## 2018-05-16 RX ADMIN — GABAPENTIN 100 MILLIGRAM(S): 400 CAPSULE ORAL at 15:27

## 2018-05-16 NOTE — PROVIDER CONTACT NOTE (OTHER) - ACTION/TREATMENT ORDERED:
EILEEN Menendez notified, instructed it is ok to use PEG at this time, will administer meds via PEG tube.
Patient scheduled for D/C today and tube feeds to be started. Awaiting further instruction.
np notified, will put in new order
NP notified, wrist restraints ordered, if needed more Ativan can be ordered, will continue to monitor.
Level 1 unsecured mittens, assess pt q 30 minutes. CTM

## 2018-05-16 NOTE — DISCHARGE NOTE ADULT - HOSPITAL COURSE
83 yo female with PMH of CVA and PEG tube 2/2 dysphagia presents to the ED for pulled out PEG tube. Family at bedside indicates that patient becomes intermittently confused and pulls on her PEG tube. PEG tube last seen in by family at 7pm last night. Today, found to have PEG tube out and balloon was inflated upon self-extrication of the tube. The granddaughter states that the family tries to put mittens on the patient at night but somehow she pulled the tube out. 81 yo female with PMH of CVA and PEG tube 2/2 dysphagia presents to the ED for pulled out PEG tube.     PEG tube replaced by GI; medically cleared for discharge by Dr Sharma.  Family refuse Home care; has private hire A

## 2018-05-16 NOTE — PROGRESS NOTE BEHAVIORAL HEALTH - NSBHCHARTREVIEWVS_PSY_A_CORE FT
T(C): 36.7 (05-16-18 @ 05:08), Max: 37.6 (05-15-18 @ 17:05)  HR: 89 (05-16-18 @ 05:08) (89 - 107)  BP: 136/88 (05-16-18 @ 05:08) (122/81 - 155/80)  RR: 18 (05-16-18 @ 05:08) (16 - 18)  SpO2: 99% (05-16-18 @ 05:08) (97% - 99%)  Wt(kg): --

## 2018-05-16 NOTE — DISCHARGE NOTE ADULT - PATIENT PORTAL LINK FT
You can access the [facility] Patient Portal, offered by [facility], by registering with the following website: [email or web address]

## 2018-05-16 NOTE — PROGRESS NOTE ADULT - PROBLEM SELECTOR PLAN 2
supportive care   fall precautions  discontinue ativan  change to Haldol PRN
supportive care   fall precautions  agitation follow psych recommendations
supportive care   fall precautions  agitation will try low dose ativan PRN

## 2018-05-16 NOTE — PROGRESS NOTE ADULT - PROBLEM SELECTOR PLAN 3
stroke prophylaxis by Eliquis  no modality of giving Apixaban at this time   will hold all meds for now  risk of another CVA in next 24 hrs very low and I do not want to use enoxaparin in case GI need to place an entirely new PEG tube  patient extremely uncooperative with labs and has to be help down by RN and PCAs hence IV heparin with its need for frequent blood draws will be extremely burdensome for the patient, mich in the face of little therapeutic gain
Eliquis resumed  will continue to monitor
stroke prophylaxis by Eliquis  no modality of giving Apixaban at this time   will hold all meds for now  risk of another CVA in next 24 hrs very low and I do not want to use enoxaparin in case GI need to place an entirely new PEG tube  patient extremely uncooperative with labs and has to be help down by RN and PCAs hence IV heparin with its need for frequent blood draws will be extremely burdensome for the patient, mich in the face of little therapeutic gain

## 2018-05-16 NOTE — DISCHARGE NOTE ADULT - SECONDARY DIAGNOSIS.
Chronic atrial fibrillation CVA (cerebral vascular accident) Vascular dementia with behavior disturbance

## 2018-05-16 NOTE — PROVIDER CONTACT NOTE (OTHER) - BACKGROUND
pt with peg and dementia, pulls at lines, agitation at times
Admitted for dislodged PEG tube. Ativan given w/ no change in behavior.
dx: PEG tube dislodged  Hx: Afib, CVA

## 2018-05-16 NOTE — PROGRESS NOTE BEHAVIORAL HEALTH - NSBHCHARTREVIEWLAB_PSY_A_CORE FT
16.1   8.0   )-----------( 234      ( 14 May 2018 11:10 )             47.8   05-14    140  |  103  |  7   ----------------------------<  370<H>  4.0   |  23  |  0.47<L>    Ca    9.1      14 May 2018 11:08

## 2018-05-16 NOTE — DIETITIAN INITIAL EVALUATION ADULT. - NS AS NUTRI INTERV ENTERAL NUTRITION
As medically feasible, recommend resume home regimen of bolus feeds. Recommend Jevity 1.2, 1 can (237ml) 4x daily as tolerated to provide 948ml fluid, 1138kcal, 53gm protein (25kcal/Kg, 1.2gm protein/Kg).

## 2018-05-16 NOTE — DISCHARGE NOTE ADULT - CARE PLAN
Principal Discharge DX:	PEG tube malfunction  Secondary Diagnosis:	Chronic atrial fibrillation  Secondary Diagnosis:	CVA (cerebral vascular accident)  Secondary Diagnosis:	Vascular dementia with behavior disturbance Principal Discharge DX:	PEG tube malfunction  Goal:	Able to use PEG tube for nutrition and medication+  Assessment and plan of treatment:	PEG tube replaced by Gastroenterology (GI) team on 5/15.  Change dressing daily; keep abdominal binder on at all time to prevent pulling out tube.  Secondary Diagnosis:	Chronic atrial fibrillation  Assessment and plan of treatment:	Continue with treatment as prescribed and follow up with your primary healthcare provider.  Secondary Diagnosis:	CVA (cerebral vascular accident)  Assessment and plan of treatment:	Continue with medications as prescribed and follow up with your primary healthcare provider.  Secondary Diagnosis:	Vascular dementia with behavior disturbance  Assessment and plan of treatment:	Continue with medications as prescribed and follow up with your primary healthcare provider.

## 2018-05-16 NOTE — DISCHARGE NOTE ADULT - PLAN OF CARE
Able to use PEG tube for nutrition and medication+ PEG tube replaced by Gastroenterology (GI) team on 5/15.  Change dressing daily; keep abdominal binder on at all time to prevent pulling out tube. Continue with treatment as prescribed and follow up with your primary healthcare provider. Continue with medications as prescribed and follow up with your primary healthcare provider.

## 2018-05-16 NOTE — PROGRESS NOTE BEHAVIORAL HEALTH - NSBHFUPINTERVALHXFT_PSY_A_CORE
Pt seen, non-verbal, at baseline secondary to CVA. No agitation overnight or today, tolerating depakote and trazodone. No self injurious behaviors.

## 2018-05-16 NOTE — PROGRESS NOTE ADULT - ASSESSMENT
81 yo female with PMH of CVA and PEG tube 2/2 dysphagia presents to the ED for pulled out PEG tube.
Impression:  1. G tube dislodgement s/p replacement, no signs of complication  2. CVA s/p PEG, on Eliquis    Plan:  - may resume G tube feeds

## 2018-05-16 NOTE — PROVIDER CONTACT NOTE (OTHER) - REASON
Pt agitated & ripping off mittens
Pt has no IV access with IVF ordered
patient A&Ox0, pulling @ lines, attempting to climb out of bed
Verification it is OK to use PEG?
re-new wrist restraints

## 2018-05-16 NOTE — PROVIDER CONTACT NOTE (OTHER) - SITUATION
wrist restraints auto-discontinued
Pt had PEG tube replaced today, instructed by day RN we were awaiting orders to use PEG. Pt has meds ordered via PEG tube.
Pt became agitated and ripping/biting off mittens.
Pt has no IV access with IVF ordered
patient A&Ox0, pulling @ lines, attempting to climb out of bed

## 2018-05-16 NOTE — PROVIDER CONTACT NOTE (OTHER) - ASSESSMENT
confused, pulls at line
pt A&Ox0, VSS, no S/S of SOB, CP, pain. patient received 1mg Ativan IV, pt continues to be restless, pulling @ IV, fingering PEG site Wound

## 2018-05-16 NOTE — PROGRESS NOTE BEHAVIORAL HEALTH - NSBHCONSULTRECOMMENDOTHER_PSY_A_CORE FT
1) continue above recommendations, grand daughter Eve made aware  2) psychiatrically cleared for d/c home with her children

## 2018-05-16 NOTE — PROGRESS NOTE ADULT - PROBLEM SELECTOR PLAN 1
awaiting reinsertion   will defer to GI
awaiting reinsertion  will defer to GI   last Apixaban dose 5/12/18 7 PM
s/p reinsertion  resume PEG feeds

## 2018-05-16 NOTE — DIETITIAN INITIAL EVALUATION ADULT. - OTHER INFO
Nutrition consult received for tube feeding. Per chart, pt with PMH of CVA and PEG tube 2/2 dysphagia presents to the ED for pulled out PEG tube. Pt S/P reinsertion, plan to resume tube feeds. No micronutrient supplementation per chart. NKFA documented.

## 2018-05-16 NOTE — DISCHARGE NOTE ADULT - MEDICATION SUMMARY - MEDICATIONS TO TAKE
I will START or STAY ON the medications listed below when I get home from the hospital:    digoxin 125 mcg (0.125 mg) oral tablet  -- 1 tab(s) by mouth once a day  -- Indication: For Atrial fibrillation    Eliquis 5 mg oral tablet  -- 1 tab(s) by mouth 2 times a day  -- Indication: For Atrial fibrillation/CVA    gabapentin 100 mg oral capsule  -- 1 cap(s) by mouth 3 times a day  -- Indication: For Undefined    valproic acid 250 mg/5 mL oral syrup  -- 5 milliliter(s) by mouth once a day  -- Indication: For Vascular dementia with behavior disturbance    Ativan 1 mg oral tablet  -- 1 tab(s) by mouth once a day (at bedtime)  -- Indication: For Anxiety    traZODone 50 mg oral tablet  -- 1 tab(s) by mouth once a day (at bedtime)  -- Indication: For Depression    Zocor 40 mg oral tablet  -- 1 tab(s) by mouth once a day (at bedtime)  -- Indication: For High cholesterol

## 2018-05-16 NOTE — PROGRESS NOTE ADULT - SUBJECTIVE AND OBJECTIVE BOX
Patient is a 82y old  Female who presents with a chief complaint of dislodged PEG (13 May 2018 13:53)    SUBJECTIVE / OVERNIGHT EVENTS: no overnight events    ROS:  Resp: No cough no sputum production  CVS: No chest pain no palpitations no orthopnea  GI: no N/V/D  : no dysuria, no hematuria  Neuro: no weakness no paresthesias  Heme: No petechiae no easy bruising  Msk: No joint pain no swelling  Skin: No rash no itching      MEDICATIONS  (STANDING):  dextrose 5% + sodium chloride 0.9%. 1000 milliLiter(s) (50 mL/Hr) IV Continuous <Continuous>  heparin  Injectable 5000 Unit(s) SubCutaneous every 8 hours    MEDICATIONS  (PRN):      CAPILLARY BLOOD GLUCOSE        I&O's Summary    14 May 2018 07:01  -  15 May 2018 07:00  --------------------------------------------------------  IN: 1200 mL / OUT: 3 mL / NET: 1197 mL        Vital Signs Last 24 Hrs  T(C): 36.8 (15 May 2018 09:10), Max: 37.6 (14 May 2018 15:03)  T(F): 98.2 (15 May 2018 09:10), Max: 99.7 (14 May 2018 15:03)  HR: 81 (15 May 2018 09:10) (70 - 92)  BP: 142/76 (15 May 2018 09:10) (140/80 - 167/92)  BP(mean): --  RR: 18 (15 May 2018 09:10) (18 - 18)  SpO2: 98% (15 May 2018 09:10) (98% - 100%)    PHYSICAL EXAM: vital signs as above  in no apparent distress confused but calm  HEENT: POLI EOMI  Neck: Supple, no JVD, no thyromegaly  Lungs: no rhonchi, no wheeze, no crackles  CVS: S1 S2 no M/R/G  Abdomen: no tenderness, no organomegaly, BS present  PEG site clean no cellulitis or drainage  Neuro: Alert, right hemiplegia  Psych: appropriate affect  Skin: warm, dry  Ext: no cyanosis or clubbing, no edema  Msk: no joint swelling or deformities  Back: no CVA tenderness, no kyphosis/scoliosis    LABS:                        16.1   8.0   )-----------( 234      ( 14 May 2018 11:10 )             47.8     05-14    140  |  103  |  7   ----------------------------<  370<H>  4.0   |  23  |  0.47<L>    Ca    9.1      14 May 2018 11:08      PT/INR - ( 14 May 2018 11:10 )   PT: 11.7 sec;   INR: 1.08 ratio                     All consultant(s) notes reviewed and care discussed with other providers    Contact Number, Dr Sharma [phone]
Patient is a 82y old  Female who presents with a chief complaint of dislodged PEG (13 May 2018 13:53)      SUBJECTIVE / OVERNIGHT EVENTS:  no events  MEDICATIONS  (STANDING):  apixaban 5 milliGRAM(s) Oral every 12 hours  dextrose 5% + sodium chloride 0.9%. 1000 milliLiter(s) (50 mL/Hr) IV Continuous <Continuous>  digoxin     Tablet 0.125 milliGRAM(s) Oral daily  gabapentin 100 milliGRAM(s) Oral three times a day  simvastatin 40 milliGRAM(s) Oral at bedtime  traZODone 50 milliGRAM(s) Oral at bedtime  valproic  acid Syrup 250 milliGRAM(s) Oral daily    MEDICATIONS  (PRN):  haloperidol    Injectable 0.5 milliGRAM(s) IntraMuscular every 6 hours PRN Agitation  LORazepam     Tablet 1 milliGRAM(s) Oral at bedtime PRN Agitation              PHYSICAL EXAM:  GENERAL: NAD, well-developed  HEAD:  Atraumatic, Normocephalic  EYES: EOMI, PERRLA, conjunctiva and sclera anicteric  NECK: Supple, No JVD  CHEST/LUNG: Clear to auscultation bilaterally; No wheeze  HEART: Regular rate and rhythm; No murmurs, rubs, or gallops  ABDOMEN: Soft, Nontender, Nondistended; Bowel sounds present, no hepatosplenomegaly, no rebound or guarding, G tube clean and intact  EXTREMITIES:  2+ Peripheral Pulses, No clubbing, cyanosis, or edema  PSYCH: AAOx3  NEUROLOGY: non-focal, no asterixis  SKIN: No rashes or lesion    LABS:                        16.1   8.0   )-----------( 234      ( 14 May 2018 11:10 )             47.8     05-14    140  |  103  |  7   ----------------------------<  370<H>  4.0   |  23  |  0.47<L>    Ca    9.1      14 May 2018 11:08        PT/INR - ( 14 May 2018 11:10 )   PT: 11.7 sec;   INR: 1.08 ratio                   RADIOLOGY & ADDITIONAL TESTS:
Patient is a 82y old  Female who presents with a chief complaint of dislodged PEG (13 May 2018 13:53)      SUBJECTIVE / OVERNIGHT EVENTS: no overnight events  more calm this AM    ROS:  Resp: No cough no sputum production  CVS: No chest pain no palpitations no orthopnea  GI: no N/V/D  : no dysuria, no hematuria  Neuro: no weakness no paresthesias  Heme: No petechiae no easy bruising  Msk: No joint pain no swelling  Skin: No rash no itching        MEDICATIONS  (STANDING):  apixaban 5 milliGRAM(s) Oral every 12 hours  dextrose 5% + sodium chloride 0.9%. 1000 milliLiter(s) (50 mL/Hr) IV Continuous <Continuous>  digoxin     Tablet 0.125 milliGRAM(s) Oral daily  gabapentin 100 milliGRAM(s) Oral three times a day  simvastatin 40 milliGRAM(s) Oral at bedtime  traZODone 50 milliGRAM(s) Oral at bedtime  valproic  acid Syrup 250 milliGRAM(s) Oral daily    MEDICATIONS  (PRN):  haloperidol    Injectable 0.5 milliGRAM(s) IntraMuscular every 6 hours PRN Agitation  LORazepam     Tablet 1 milliGRAM(s) Oral at bedtime PRN Agitation        CAPILLARY BLOOD GLUCOSE        I&O's Summary    15 May 2018 07:01  -  16 May 2018 07:00  --------------------------------------------------------  IN: 0 mL / OUT: 3 mL / NET: -3 mL        Vital Signs Last 24 Hrs  T(C): 36.7 (16 May 2018 05:08), Max: 37.6 (15 May 2018 17:05)  T(F): 98.1 (16 May 2018 05:08), Max: 99.7 (15 May 2018 21:14)  HR: 89 (16 May 2018 05:08) (89 - 107)  BP: 136/88 (16 May 2018 05:08) (122/81 - 155/80)  BP(mean): --  RR: 18 (16 May 2018 05:08) (16 - 18)  SpO2: 99% (16 May 2018 05:08) (97% - 99%)    PHYSICAL EXAM: vital signs as above  in no apparent distress confused but calm  HEENT: POLI EOMI  Neck: Supple, no JVD, no thyromegaly  Lungs: no rhonchi, no wheeze, no crackles  CVS: S1 S2 no M/R/G  Abdomen: no tenderness, no organomegaly, BS present  PEG site clean no cellulitis or drainage  PEG in place  Neuro: Alert, right hemiplegia  Skin: warm, dry  Ext: no cyanosis or clubbing, no edema  Msk: no joint swelling or deformities  Back: no CVA tenderness, no kyphosis/scoliosis    LABS:                        16.1   8.0   )-----------( 234      ( 14 May 2018 11:10 )             47.8     05-14    140  |  103  |  7   ----------------------------<  370<H>  4.0   |  23  |  0.47<L>    Ca    9.1      14 May 2018 11:08      PT/INR - ( 14 May 2018 11:10 )   PT: 11.7 sec;   INR: 1.08 ratio                     All consultant(s) notes reviewed and care discussed with other providers    Contact Number, Dr Sharma [phone]
Patient is a 82y old  Female who presents with a chief complaint of dislodged PEG (13 May 2018 13:53)      SUBJECTIVE / OVERNIGHT EVENTS: overnight events noted  agitated now calm    ROS:  Resp: No cough no sputum production  GI: no N/V/D          MEDICATIONS  (STANDING):  dextrose 5% + sodium chloride 0.9%. 1000 milliLiter(s) (50 mL/Hr) IV Continuous <Continuous>  heparin  Injectable 5000 Unit(s) SubCutaneous every 8 hours    MEDICATIONS  (PRN):        CAPILLARY BLOOD GLUCOSE        I&O's Summary    13 May 2018 07:01  -  14 May 2018 07:00  --------------------------------------------------------  IN: 750 mL / OUT: 2 mL / NET: 748 mL    14 May 2018 07:01  -  14 May 2018 16:02  --------------------------------------------------------  IN: 0 mL / OUT: 2 mL / NET: -2 mL        Vital Signs Last 24 Hrs  T(C): 37.6 (14 May 2018 15:03), Max: 37.6 (14 May 2018 15:03)  T(F): 99.7 (14 May 2018 15:03), Max: 99.7 (14 May 2018 15:03)  HR: 92 (14 May 2018 15:03) (68 - 92)  BP: 144/80 (14 May 2018 15:03) (128/62 - 159/79)  BP(mean): --  RR: 18 (14 May 2018 15:03) (15 - 19)  SpO2: 98% (14 May 2018 15:03) (98% - 100%)    PHYSICAL EXAM: vital signs as above  in no apparent distress confused but calm  HEENT: POLI EOMI  Neck: Supple, no JVD, no thyromegaly  Lungs: no rhonchi, no wheeze, no crackles  CVS: S1 S2 no M/R/G  Abdomen: no tenderness, no organomegaly, BS present  PEG site clean no cellulitis or drainage  Neuro: Alert, right hemiplegia  Psych: appropriate affect  Skin: warm, dry  Ext: no cyanosis or clubbing, no edema  Msk: no joint swelling or deformities  Back: no CVA tenderness, no kyphosis/scoliosis    LABS:                        16.1   8.0   )-----------( 234      ( 14 May 2018 11:10 )             47.8     05-14    140  |  103  |  7   ----------------------------<  370<H>  4.0   |  23  |  0.47<L>    Ca    9.1      14 May 2018 11:08    TPro  7.8  /  Alb  4.0  /  TBili  0.3  /  DBili  x   /  AST  47<H>  /  ALT  33  /  AlkPhos  54  05-13    PT/INR - ( 14 May 2018 11:10 )   PT: 11.7 sec;   INR: 1.08 ratio         PTT - ( 13 May 2018 11:03 )  PTT:31.5 sec            All consultant(s) notes reviewed and care discussed with other providers    Contact Number, Dr Sharma [phone]

## 2018-05-16 NOTE — DIETITIAN INITIAL EVALUATION ADULT. - ENERGY NEEDS
Ht: 60 inches (estimated) Wt: 100 pounds BMI: 19.5 kg/m2 IBW: 100 pounds(+/-10%) %%  +1 left wrist edema. No pressure ulcers documented.

## 2018-07-08 ENCOUNTER — EMERGENCY (EMERGENCY)
Facility: HOSPITAL | Age: 83
LOS: 1 days | Discharge: HOME | End: 2018-07-08
Attending: EMERGENCY MEDICINE
Payer: MEDICARE

## 2018-07-08 VITALS
WEIGHT: 100.09 LBS | DIASTOLIC BLOOD PRESSURE: 83 MMHG | RESPIRATION RATE: 18 BRPM | OXYGEN SATURATION: 99 % | TEMPERATURE: 97 F | SYSTOLIC BLOOD PRESSURE: 140 MMHG | HEART RATE: 75 BPM

## 2018-07-08 PROBLEM — K94.23 GASTROSTOMY MALFUNCTION: Chronic | Status: ACTIVE | Noted: 2018-05-13

## 2018-07-08 PROBLEM — I63.9 CEREBRAL INFARCTION, UNSPECIFIED: Chronic | Status: ACTIVE | Noted: 2018-05-13

## 2018-07-08 PROCEDURE — 99283 EMERGENCY DEPT VISIT LOW MDM: CPT | Mod: 25

## 2018-07-08 PROCEDURE — L8699: CPT

## 2018-07-08 PROCEDURE — 49465 FLUORO EXAM OF G/COLON TUBE: CPT

## 2018-07-08 PROCEDURE — 99284 EMERGENCY DEPT VISIT MOD MDM: CPT | Mod: 25

## 2018-07-08 PROCEDURE — 43760 CHANGE GASTROSTOMY TUBE PERCUTANEOUS W/O GUIDE: CPT

## 2018-07-08 PROCEDURE — 43760: CPT

## 2018-07-08 NOTE — ED PROVIDER NOTE - OBJECTIVE STATEMENT
83F, hx CVA and dysphagia with G-tube presents with family due to having pulled out feeding tube. Patient is non verbal, unable to understand/communicate 2/2 CVA. per family, patient pulled out feeding tube this AM. No other issues at this time. Per family, this same issue has happened before. Patient lives at home with family, has visiting nurse.

## 2018-07-08 NOTE — ED PROVIDER NOTE - PROGRESS NOTE DETAILS
Tube appears to be in normal position, no extravasation of contrast  Will d/c home with return precautions and instructions to follow up with patient primary physician  Alexys Sarkar MD, PGY2

## 2018-07-08 NOTE — ED PROVIDER NOTE - PHYSICAL EXAMINATION
Non verbal  Awake  G-tube site noted, g-tube removed.   No infection noted at site of g-tube insertion

## 2018-07-08 NOTE — ED ADULT TRIAGE NOTE - CHIEF COMPLAINT QUOTE
As per son pt pulled out her feeding tube this morning . As per son pt pulled out her feeding tube this morning . As per family this is a common occurrence.

## 2018-07-08 NOTE — ED PROVIDER NOTE - ATTENDING CONTRIBUTION TO CARE
Attending MD Wilcox:  I personally have seen and examined this patient.  Resident note reviewed and agree on plan of care and except where noted.  See MDM for details.

## 2018-07-08 NOTE — ED PROVIDER NOTE - MEDICAL DECISION MAKING DETAILS
83F, CVA and dysphagia with G tube, presents for feeding tube replacement. No other acute issues at this time per family. Will replace feeding tube, check placement with xray. will d/c home with return precautions and GI f/u recommended. Currently stable, no acute distress. Will continue to follow up and re-assess. Case discussed with Attending: Jeri Sarkar MD, PGY2 83F, CVA and dysphagia with G tube, presents for feeding tube replacement. No other acute issues at this time per family. Will replace feeding tube, check placement with xray. will d/c home with return precautions and GI f/u recommended. Currently stable, no acute distress. Will continue to follow up and re-assess. Case discussed with Attending: Jeri Sarkar MD, PGY2    Attending MD Wilcox: 82 yo female with G-tube secondary to CVA and dysphagia.  Presents with G-tube pulled out. 83F, CVA and dysphagia with G tube, presents for feeding tube replacement. No other acute issues at this time per family. Will replace feeding tube, check placement with xray. will d/c home with return precautions and GI f/u recommended. Currently stable, no acute distress. Will continue to follow up and re-assess. Case discussed with Attending: Jeri Sarkar MD, PGY2    Attending MD Wilcox: 84 yo female with G-tube secondary to CVA and dysphagia.  Presents with G-tube pulled out since 6a.  Abd insertion site with granulation tissue, minimal bleeding.  Belly no tender. 83F, CVA and dysphagia with G tube, presents for feeding tube replacement. No other acute issues at this time per family. Will replace feeding tube, check placement with xray. will d/c home with return precautions and GI f/u recommended. Currently stable, no acute distress. Will continue to follow up and re-assess. Case discussed with Attending: Jeri Sarkar MD, PGY2    Attending MD Wilcox: 84 yo female with G-tube secondary to CVA and dysphagia.  Presents with G-tube pulled out since 6a.  Abd insertion site with granulation tissue, minimal bleeding.  Belly non tender.  20 F G-tube replaced and gastrograffin xray performed confirming replacement.

## 2018-07-08 NOTE — ED PROCEDURE NOTE - CPROC ED INFORMED CONSENT1
Consent from family, patient non verbal/Benefits, risks, and possible complications of procedure explained to patient/caregiver who verbalized understanding and gave verbal consent.

## 2019-03-19 ENCOUNTER — EMERGENCY (EMERGENCY)
Facility: HOSPITAL | Age: 84
LOS: 1 days | Discharge: HOME | End: 2019-03-19
Attending: STUDENT IN AN ORGANIZED HEALTH CARE EDUCATION/TRAINING PROGRAM
Payer: MEDICARE

## 2019-03-19 VITALS
SYSTOLIC BLOOD PRESSURE: 127 MMHG | HEIGHT: 60 IN | HEART RATE: 74 BPM | RESPIRATION RATE: 18 BRPM | WEIGHT: 95.02 LBS | DIASTOLIC BLOOD PRESSURE: 81 MMHG | OXYGEN SATURATION: 96 % | TEMPERATURE: 98 F

## 2019-03-19 VITALS
SYSTOLIC BLOOD PRESSURE: 122 MMHG | TEMPERATURE: 98 F | HEART RATE: 72 BPM | OXYGEN SATURATION: 97 % | DIASTOLIC BLOOD PRESSURE: 76 MMHG | RESPIRATION RATE: 18 BRPM

## 2019-03-19 PROCEDURE — 49465 FLUORO EXAM OF G/COLON TUBE: CPT

## 2019-03-19 PROCEDURE — 99284 EMERGENCY DEPT VISIT MOD MDM: CPT | Mod: 25

## 2019-03-19 PROCEDURE — 99283 EMERGENCY DEPT VISIT LOW MDM: CPT | Mod: 25

## 2019-03-19 PROCEDURE — 43762 RPLC GTUBE NO REVJ TRC: CPT

## 2019-03-19 NOTE — ED PROVIDER NOTE - OBJECTIVE STATEMENT
83F, hx CVA, dementia, dysphagia with G-tube presents with family due to having pulled out feeding tube. Patient is non verbal, unable to understand/communicate due to  CVA. per family, patient pulled out feeding tube sometime between 11.30pm to 3am. No other issues at this time. Per family, this same issue has happened before. Patient lives at home with family. acting at baseline. no recent illness

## 2019-03-19 NOTE — ED PROVIDER NOTE - PHYSICAL EXAMINATION
*GEN: NAD; well appearing; aphasic  *HEAD: NC/AT   *EYES/NOSE: PERRL & EOMI b/l  *THROAT: airway patent, moist mucous membranes  *NECK: Neck supple, no masses  *PULMONARY: CTA b/l, symmetric breath sounds.   *CARDIAC: s1s2, regular rhythm, no Murmur  *ABDOMEN:  ND, NT, soft, no guarding, no rebound, no masses; stoma with clotted blood & subq tissue  *BACK: no CVA tenderness, Normal  spine   *EXTREMITIES: symmetric pulses, 2+ dp & radial pulses, capillary refill < 2 seconds, no cyanosis, no edema   *SKIN: no rash or bruising   *NEUROLOGIC: alert, CN 2-12 intact, moves all 4 extremities, full active & passive ROM in all extremities,  *PSYCH: aphasic, demented, behaving at baseline per family

## 2019-03-19 NOTE — ED PROVIDER NOTE - NSFOLLOWUPINSTRUCTIONS_ED_ALL_ED_FT
FOLLOW UP WITH PMD  & GI Dr WITHIN 1-2DAYS, CALL TO MAKE APPOINTMENT  COME BACK TO ED IF YOUR CONDITION WORSENS OR IF YOU DEVELOP FEVER GREATER THAN 100.4F, CHEST PAIN,  SHORTNESS OF BREATH OR ANY OTHER SYMPTOMS CONCERNING TO YOU

## 2019-03-19 NOTE — ED PROVIDER NOTE - CLINICAL SUMMARY MEDICAL DECISION MAKING FREE TEXT BOX
dislodged G tube with bleeding around stoma, initially had 20Fr, replaced with 14Fr. xray confirmed placement. will dc w/ GI f/u

## 2019-03-19 NOTE — ED ADULT NURSE NOTE - INTERVENTIONS DEFINITIONS
Non-slip footwear when patient is off stretcher/Physically safe environment: no spills, clutter or unnecessary equipment/Provide visual clues: red socks/Stretcher in lowest position, wheels locked, appropriate side rails in place

## 2019-03-19 NOTE — ED ADULT NURSE NOTE - OBJECTIVE STATEMENT
Pt is an 4 yo female c/o "pulling feeding tube out" because of her dementia. Pt is an 84 yo female c/o "pulling feeding tube out" because of her dementia. Son states that she's non-verbal.

## 2019-04-17 ENCOUNTER — EMERGENCY (EMERGENCY)
Facility: HOSPITAL | Age: 84
LOS: 1 days | Discharge: HOME | End: 2019-04-17
Attending: EMERGENCY MEDICINE
Payer: MEDICARE

## 2019-04-17 VITALS
RESPIRATION RATE: 16 BRPM | DIASTOLIC BLOOD PRESSURE: 79 MMHG | WEIGHT: 98.11 LBS | SYSTOLIC BLOOD PRESSURE: 136 MMHG | HEART RATE: 68 BPM | HEIGHT: 60 IN | OXYGEN SATURATION: 96 % | TEMPERATURE: 98 F

## 2019-04-17 VITALS
DIASTOLIC BLOOD PRESSURE: 77 MMHG | SYSTOLIC BLOOD PRESSURE: 135 MMHG | OXYGEN SATURATION: 96 % | HEART RATE: 65 BPM | RESPIRATION RATE: 16 BRPM | TEMPERATURE: 97 F

## 2019-04-17 PROCEDURE — 49465 FLUORO EXAM OF G/COLON TUBE: CPT

## 2019-04-17 PROCEDURE — 43762 RPLC GTUBE NO REVJ TRC: CPT

## 2019-04-17 PROCEDURE — 99284 EMERGENCY DEPT VISIT MOD MDM: CPT | Mod: 25

## 2019-04-17 PROCEDURE — 99283 EMERGENCY DEPT VISIT LOW MDM: CPT | Mod: 25

## 2019-04-17 NOTE — ED PROVIDER NOTE - PHYSICAL EXAMINATION
*Gen: lying in bed, non-verbal secondary to past CVA  *HEENT: NC/AT, MMM, airway patent, trachea midline  *CV: RRR, S1/S2 present, no murmurs  *Resp: no respiratory distress, LCTAB, no wheezing  *Abd: non-distended, soft N/Tx4, no guarding or rigidity, G-tube in place (unable to be flushed) - no surrounding redness/signs of infection  *Neuro: unable to perform given patient mental status and prior stroke; family report this is patient's baseline  *Extremities: + contractures on LUE  *Skin: no rashes, no wounds   ~ Julissa Childs M.D.

## 2019-04-17 NOTE — ED PROVIDER NOTE - ATTENDING CONTRIBUTION TO CARE
83F, hx CVA, dementia, dysphagia with G-tube presents with family due to having a blocked g tube size 14 changed last month in the ER from a 20G, secondary to pulling it out.  pt last used it last night, abd soft, nt. vss, flush, possibly change tube.

## 2019-04-17 NOTE — ED ADULT NURSE NOTE - NSIMPLEMENTINTERV_GEN_ALL_ED
Implemented All Fall with Harm Risk Interventions:  [place] to call system. Call bell, personal items and telephone within reach. Instruct patient to call for assistance. Room bathroom lighting operational. Non-slip footwear when patient is off stretcher. Physically safe environment: no spills, clutter or unnecessary equipment. Stretcher in lowest position, wheels locked, appropriate side rails in place. Provide visual cue, wrist band, yellow gown, etc. Monitor gait and stability. Monitor for mental status changes and reorient to person, place, and time. Review medications for side effects contributing to fall risk. Reinforce activity limits and safety measures with patient and family. Provide visual clues: red socks.

## 2019-04-17 NOTE — ED PROVIDER NOTE - OBJECTIVE STATEMENT
83 year-old female with history of CVA (inability to swallow) and status-post G-tube 2 years ago.  Patient ripped out her G-tube 3 weeks ago; was changed from her 20 Fr tube to 14 Fr for the past 3 weeks.  Family mentions they are concerned about a blockage b/c they have been unable to use the G-tube since AM.  No fevers, chills, nausea, diarrhea, BIS.

## 2019-04-17 NOTE — ED PROVIDER NOTE - CHIEF COMPLAINT
The patient is a 83y Female complaining of The patient is a 83y Female complaining of blocker G-tube.

## 2019-04-17 NOTE — ED ADULT NURSE NOTE - OBJECTIVE STATEMENT
83 year old female presents to the ED via walk in with c/o clogged PEG tube. Patient has hx of stroke with associated dysphagia and now solely uses PEG tube for feeding and medications. Family noticed PEG tube beginning to get clotted this morning, and was unable to flush tube this evening. PEG was recently changed to size 14 as per family. Pt denies any s/s pain or discomfort. Comfort and safety measures maintained.

## 2019-04-17 NOTE — ED PROVIDER NOTE - CLINICAL SUMMARY MEDICAL DECISION MAKING FREE TEXT BOX
See Attending Note See Attending Note    Amadeo ESTRADA: 83 year-old female with history of CVA ( and inability to swallow) status-post G-tube - not flushing.  Plan to change G-tube and x-ray to confirm placement.

## 2019-05-19 ENCOUNTER — INPATIENT (INPATIENT)
Facility: HOSPITAL | Age: 84
LOS: 8 days | Discharge: HOME | DRG: 981 | End: 2019-05-28
Attending: INTERNAL MEDICINE | Admitting: INTERNAL MEDICINE
Payer: MEDICARE

## 2019-05-19 VITALS
RESPIRATION RATE: 19 BRPM | HEIGHT: 60 IN | TEMPERATURE: 98 F | WEIGHT: 100.09 LBS | SYSTOLIC BLOOD PRESSURE: 140 MMHG | DIASTOLIC BLOOD PRESSURE: 85 MMHG | OXYGEN SATURATION: 97 % | HEART RATE: 74 BPM

## 2019-05-19 DIAGNOSIS — K94.23 GASTROSTOMY MALFUNCTION: ICD-10-CM

## 2019-05-19 DIAGNOSIS — I63.9 CEREBRAL INFARCTION, UNSPECIFIED: ICD-10-CM

## 2019-05-19 DIAGNOSIS — R29.91 UNSPECIFIED SYMPTOMS AND SIGNS INVOLVING THE MUSCULOSKELETAL SYSTEM: ICD-10-CM

## 2019-05-19 DIAGNOSIS — F03.91 UNSPECIFIED DEMENTIA WITH BEHAVIORAL DISTURBANCE: ICD-10-CM

## 2019-05-19 DIAGNOSIS — I48.91 UNSPECIFIED ATRIAL FIBRILLATION: ICD-10-CM

## 2019-05-19 DIAGNOSIS — Z29.9 ENCOUNTER FOR PROPHYLACTIC MEASURES, UNSPECIFIED: ICD-10-CM

## 2019-05-19 LAB
ALBUMIN SERPL ELPH-MCNC: 4 G/DL — SIGNIFICANT CHANGE UP (ref 3.3–5)
ALP SERPL-CCNC: 58 U/L — SIGNIFICANT CHANGE UP (ref 40–120)
ALT FLD-CCNC: 45 U/L — SIGNIFICANT CHANGE UP (ref 10–45)
ANION GAP SERPL CALC-SCNC: 13 MMOL/L — SIGNIFICANT CHANGE UP (ref 5–17)
APTT BLD: 34.5 SEC — SIGNIFICANT CHANGE UP (ref 27.5–36.3)
AST SERPL-CCNC: 64 U/L — HIGH (ref 10–40)
BASOPHILS # BLD AUTO: 0 K/UL — SIGNIFICANT CHANGE UP (ref 0–0.2)
BASOPHILS NFR BLD AUTO: 0.5 % — SIGNIFICANT CHANGE UP (ref 0–2)
BILIRUB SERPL-MCNC: 0.8 MG/DL — SIGNIFICANT CHANGE UP (ref 0.2–1.2)
BUN SERPL-MCNC: 19 MG/DL — SIGNIFICANT CHANGE UP (ref 7–23)
CALCIUM SERPL-MCNC: 9.4 MG/DL — SIGNIFICANT CHANGE UP (ref 8.4–10.5)
CHLORIDE SERPL-SCNC: 102 MMOL/L — SIGNIFICANT CHANGE UP (ref 96–108)
CO2 SERPL-SCNC: 25 MMOL/L — SIGNIFICANT CHANGE UP (ref 22–31)
CREAT SERPL-MCNC: 0.46 MG/DL — LOW (ref 0.5–1.3)
EOSINOPHIL # BLD AUTO: 0.1 K/UL — SIGNIFICANT CHANGE UP (ref 0–0.5)
EOSINOPHIL NFR BLD AUTO: 1.4 % — SIGNIFICANT CHANGE UP (ref 0–6)
GLUCOSE SERPL-MCNC: 124 MG/DL — HIGH (ref 70–99)
HCT VFR BLD CALC: 44 % — SIGNIFICANT CHANGE UP (ref 34.5–45)
HGB BLD-MCNC: 14.6 G/DL — SIGNIFICANT CHANGE UP (ref 11.5–15.5)
INR BLD: 1.13 RATIO — SIGNIFICANT CHANGE UP (ref 0.88–1.16)
LYMPHOCYTES # BLD AUTO: 1.4 K/UL — SIGNIFICANT CHANGE UP (ref 1–3.3)
LYMPHOCYTES # BLD AUTO: 20.4 % — SIGNIFICANT CHANGE UP (ref 13–44)
MCHC RBC-ENTMCNC: 33 PG — SIGNIFICANT CHANGE UP (ref 27–34)
MCHC RBC-ENTMCNC: 33.1 GM/DL — SIGNIFICANT CHANGE UP (ref 32–36)
MCV RBC AUTO: 99.5 FL — SIGNIFICANT CHANGE UP (ref 80–100)
MONOCYTES # BLD AUTO: 0.4 K/UL — SIGNIFICANT CHANGE UP (ref 0–0.9)
MONOCYTES NFR BLD AUTO: 5.5 % — SIGNIFICANT CHANGE UP (ref 2–14)
NEUTROPHILS # BLD AUTO: 5 K/UL — SIGNIFICANT CHANGE UP (ref 1.8–7.4)
NEUTROPHILS NFR BLD AUTO: 72.2 % — SIGNIFICANT CHANGE UP (ref 43–77)
PLATELET # BLD AUTO: 209 K/UL — SIGNIFICANT CHANGE UP (ref 150–400)
POTASSIUM SERPL-MCNC: 4.8 MMOL/L — SIGNIFICANT CHANGE UP (ref 3.5–5.3)
POTASSIUM SERPL-SCNC: 4.8 MMOL/L — SIGNIFICANT CHANGE UP (ref 3.5–5.3)
PROT SERPL-MCNC: 7.1 G/DL — SIGNIFICANT CHANGE UP (ref 6–8.3)
PROTHROM AB SERPL-ACNC: 12.9 SEC — SIGNIFICANT CHANGE UP (ref 10–12.9)
RBC # BLD: 4.42 M/UL — SIGNIFICANT CHANGE UP (ref 3.8–5.2)
RBC # FLD: 11.3 % — SIGNIFICANT CHANGE UP (ref 10.3–14.5)
SODIUM SERPL-SCNC: 140 MMOL/L — SIGNIFICANT CHANGE UP (ref 135–145)
WBC # BLD: 6.9 K/UL — SIGNIFICANT CHANGE UP (ref 3.8–10.5)
WBC # FLD AUTO: 6.9 K/UL — SIGNIFICANT CHANGE UP (ref 3.8–10.5)

## 2019-05-19 PROCEDURE — 73630 X-RAY EXAM OF FOOT: CPT | Mod: 26,RT

## 2019-05-19 PROCEDURE — 99222 1ST HOSP IP/OBS MODERATE 55: CPT

## 2019-05-19 PROCEDURE — 99285 EMERGENCY DEPT VISIT HI MDM: CPT

## 2019-05-19 RX ORDER — SODIUM CHLORIDE 9 MG/ML
1000 INJECTION, SOLUTION INTRAVENOUS
Refills: 0 | Status: DISCONTINUED | OUTPATIENT
Start: 2019-05-19 | End: 2019-05-22

## 2019-05-19 RX ORDER — SODIUM CHLORIDE 9 MG/ML
1000 INJECTION INTRAMUSCULAR; INTRAVENOUS; SUBCUTANEOUS ONCE
Refills: 0 | Status: COMPLETED | OUTPATIENT
Start: 2019-05-19 | End: 2019-05-19

## 2019-05-19 RX ORDER — VALPROIC ACID (AS SODIUM SALT) 250 MG/5ML
250 SOLUTION, ORAL ORAL DAILY
Refills: 0 | Status: DISCONTINUED | OUTPATIENT
Start: 2019-05-19 | End: 2019-05-22

## 2019-05-19 RX ORDER — DIGOXIN 250 MCG
0.1 TABLET ORAL DAILY
Refills: 0 | Status: DISCONTINUED | OUTPATIENT
Start: 2019-05-19 | End: 2019-05-22

## 2019-05-19 RX ADMIN — SODIUM CHLORIDE 80 MILLILITER(S): 9 INJECTION, SOLUTION INTRAVENOUS at 18:31

## 2019-05-19 RX ADMIN — SODIUM CHLORIDE 80 MILLILITER(S): 9 INJECTION, SOLUTION INTRAVENOUS at 16:41

## 2019-05-19 RX ADMIN — Medication 25 MILLIGRAM(S): at 17:18

## 2019-05-19 RX ADMIN — SODIUM CHLORIDE 1000 MILLILITER(S): 9 INJECTION INTRAMUSCULAR; INTRAVENOUS; SUBCUTANEOUS at 13:08

## 2019-05-19 NOTE — ED PROVIDER NOTE - CLINICAL SUMMARY MEDICAL DECISION MAKING FREE TEXT BOX
84 y/o F s/p CVA nonverbal presenting with clogged PEG tube since last night, unable to pass any meds for nutrition through tube per son and granddaughter. PE remarkable for tube in place appears without infection. Will attempt to pass tube and call surgery/GI if unable. 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 Will provide instructions on close podiatry follow-up for further plan and treatment.

## 2019-05-19 NOTE — ED PROVIDER NOTE - PHYSICAL EXAMINATION
GEN: Well Appearing, Nontoxic, NAD  HEENT: NC/AT, Symm Facies. PERRL, EOMI, MMM, posterior pharynx clear  CV: No JVD/Bruits or stridor;  +S1S2, RRR w/o m/g/r  RESP: CTAB w/o w/r/r  ABD: Soft, nt/nd, +BS. No guarding/rebound. No RUQ tender, no CVAT. +PEG in place without erythema surrounding or drainage  EXT/MSK: No lower extremity edema or calf tenderness. WWP, palpable pulses. Contracture of RUE noted. R2nd toe with screw protruding from distal tip with chronic wound to anterior aspect of phalange without any evidence of acute infection without erythema, edema or warmth.   SKIN: No erythema, lesions or rash.

## 2019-05-19 NOTE — ED PROVIDER NOTE - OBJECTIVE STATEMENT
84 y/o F pmhx CVA with R side contraction at baseline, nonverbal, presenting with son and granddaughter for clogged PEG tube. Patient was here x1mo ago with same issue, had 20F at that time which was replaced with a 14F. Family stating patient has no close follow-up with specialist who follows her tube, and has come to the ED twice since the time it was placed x1.5 years ago for replacements, has not had any follow-up with doctor who placed it at [place]. Reports last night when trying to administer medications and food through tube unable to pass tube. State patient does not take anything by mouth. Report patient also has been noted to have a concern hardware coming out of her R second toe from a surgery performed on this toe remotely. Family unsure of how long ago the surgery was performed or what the screw was placed for, and have noted over several months that the screw has been protruding from the tip of her toe. Denying any fevers, chills, spreading redness from the toe, severe noting of pain with touching the area or drainage.

## 2019-05-19 NOTE — H&P ADULT - ASSESSMENT
83F pmhx of CVA with right sided deficits RUE>RLE resulting in dementia with behavioral disturbance and dysphagia requiring PEG tube, Afib on Eliquis who presents for dislodged PEG tube.

## 2019-05-19 NOTE — H&P ADULT - NSHPLABSRESULTS_GEN_ALL_CORE
14.6   6.9   )-----------( 209      ( 19 May 2019 11:49 )             44.0     19 May 2019 11:49    140    |  102    |  19     ----------------------------<  124    4.8     |  25     |  0.46     Ca    9.4        19 May 2019 11:49    TPro  7.1    /  Alb  4.0    /  TBili  0.8    /  DBili  x      /  AST  64     /  ALT  45     /  AlkPhos  58     19 May 2019 11:49    LIVER FUNCTIONS - ( 19 May 2019 11:49 )  Alb: 4.0 g/dL / Pro: 7.1 g/dL / ALK PHOS: 58 U/L / ALT: 45 U/L / AST: 64 U/L / GGT: x           PT/INR - ( 19 May 2019 11:49 )   PT: 12.9 sec;   INR: 1.13 ratio         PTT - ( 19 May 2019 11:49 )  PTT:34.5 sec  CAPILLARY BLOOD GLUCOSE

## 2019-05-19 NOTE — H&P ADULT - HISTORY OF PRESENT ILLNESS
83F pmhx of CVA with right sided deficits RUE>RLE resulting in dementia with behavioral disturbance and dysphagia requiring PEG tube, Afib on Eliquis who presents for dislodged PEG tube.  They were trying to give meds yesterday and it was clogged and per pt's son came out.  PEG was placed by GI in 2018 for dysphagia 2/2 stroke.  She also has a screw protruding from her right 2nd toe which her son says has been there a long time.     In ED:  T 98.2 HR 81 /81 RR 16 O2 97%   GI called for PEG eval.

## 2019-05-19 NOTE — H&P ADULT - PROBLEM SELECTOR PLAN 5
Has screw protruding from right 2nd toe. Not infected has been there for years.  Podiatry eval on Monday- non urgent. They do not know what surgery was performed.  Will get Xray of foot.

## 2019-05-19 NOTE — ED ADULT NURSE NOTE - OBJECTIVE STATEMENT
84 y/o female with pmhx of cva bib family with c/o clogged peg tube.  per family, pt has 14FR that was replaced 2 weeks ago for same problem.  family tried irrigation with no success.  upon arrival,  peg tube was irrigated again with warm h20 and milked with ginger ale.  sediment dislodged and removed from top of tube, but still unable to flush.  Md attempted reinsertion with both 16fr and 14 fr with no success; gt unable to advance.  although pt is nonverbal r/t cva, pt became agitated and pushing away from painful stimulus.  pt was also diaphoretic.  GI team consulted for further assessment.  pt is awake and responsive to all stimuli.  no sob or respiratory distress noted.  pt resting with family at bedside.  will continue to monitor.

## 2019-05-19 NOTE — ED PROVIDER NOTE - ATTENDING CONTRIBUTION TO CARE
Dr Smith Note: 82yo F w pmhx CVA w right sided contractures and PEG tube since 2018 brought in by family for clogged PEG tube, pt was seen in ED multiple times in past for same, they state do not have follow up  Family denies any fevers, chills, vomiting or changes from her baseline     Since last night not able to able to push food or meds since (tried soda and pushing w no improvement)     Family also asking questions in regards to her right foot, had surgery done years ago, unsure with who and has had some metal (hardware) visible for some time, no new or active issues       Gen: no acute distress non toxic alert and coherent, no cyanosis   HEENT: atraumatic,  no scleral icterus  EOMI   Neck: no midline tenderness, supple  Lungs: Air entry good, clear to auscultation and percussion   CVS: reg HR S1/S2 no murmur no gallop   ABD: +BS in all 4 quadrants, soft, non tender,  non distended, non palpable liver and spleen and no other masses. no hernias. +PEG tube in place- no local erythematous, no drainage, unable to pass anything   Neuro: Awake, alert, RUE contracted   EXT- +right 2nd toe with metal hardware noticed from distal tip of 2nd toe, no drainage, no erythema,     1-PEG tube clogged- will attempt to exchange, no e/o infection   2-2nd toe with visible hardware- no acute changes, no e/o infection, no acute issues--> podiatry f/u

## 2019-05-19 NOTE — H&P ADULT - PROBLEM SELECTOR PLAN 3
MAYRA 5  Holding Eliquis for PEG placement Tuesday or Wednesday  Digoxin 125mcg PO daily converted to IV 0.1mg IV daily

## 2019-05-19 NOTE — H&P ADULT - PROBLEM SELECTOR PLAN 2
Previous hx of stroke with right sided deficits unable to use RUE and limited use of RLE, non verbal with dementia w/ behavior disturbance now.  Holding Eliquis and Aspirin  Should resume Statin 40mg PO daily when PEG replaced

## 2019-05-19 NOTE — ED ADULT NURSE NOTE - NSIMPLEMENTINTERV_GEN_ALL_ED
Implemented All Fall Risk Interventions:  [place] to call system. Call bell, personal items and telephone within reach. Instruct patient to call for assistance. Room bathroom lighting operational. Non-slip footwear when patient is off stretcher. Physically safe environment: no spills, clutter or unnecessary equipment. Stretcher in lowest position, wheels locked, appropriate side rails in place. Provide visual cue, wrist band, yellow gown, etc. Monitor gait and stability. Monitor for mental status changes and reorient to person, place, and time. Review medications for side effects contributing to fall risk. Reinforce activity limits and safety measures with patient and family.

## 2019-05-19 NOTE — H&P ADULT - PROBLEM SELECTOR PLAN 1
GI following for PEG replacement. Holding Eliquis until Tuesday/Wednesday. Converted Digoxin and Valproic acid to IV and will give D5/.5NS @80mls/hr for hydration/nutrition.

## 2019-05-19 NOTE — ED ADULT TRIAGE NOTE - CHIEF COMPLAINT QUOTE
PEG tube clogged, (previously clogged --> replaced with small tube x 1 month ago, now with similar c/o), R 2nd toe wound- hx "metal inside toe", +hx cva

## 2019-05-19 NOTE — ED PROVIDER NOTE - PROGRESS NOTE DETAILS
case and plan d/w surgery for consult to replace tube as attempts with replacement with 16F and 14F unsuccessful by ED team at bedside. surgery reports will come to see and evaluate patient; however, noted in chart patient had to have PEG replaced here by GI x1 year ago and recommending calling GI. -Naty Garcia PA-C discussion with GI fellow Aimee, stating patient needs NG tube placed for now for nutrition and meds PRN, and admit for patient to have PEG replaced inpatient. Will admit to medicine. -Naty Garcia PA-C lengthy discussion with family regarding plan for patient. at this time family is stating they do not want NG tube if possible, as patient has pulled her PEG tube out a few times in the past and they are concerned that she will be very uncomfortable and will pull the NG tube out as well. Advised family will continue to hydrate patient through the IV with IVF. Medications can be administered through IV prn; however, no meds are emergently needed to be given at this time. Transfer of care to inpatient medicine team is happening at this time and advised family that placement of NG tube may become necessary and family report will discuss with inpatient team at that time and understand. -Naty Garcia PA-C

## 2019-05-19 NOTE — CONSULT NOTE ADULT - ASSESSMENT
82 y/o F pmhx CVA with R side contraction at baseline, nonverbal, presenting with son and granddaughter for clogged PEG tube. GI now consulted for replacement.    1) PEG replacement    - please place NGT for feeds  - plan for  PEG tube placement endoscopically on Tuesday or Wednesday  - please keep NPO after midnight on Monday night 82 y/o F pmhx CVA on eliquis with R side contraction at baseline, nonverbal, presenting with son and granddaughter for clogged PEG tube. GI now consulted for replacement.    1) PEG replacement    - please place NGT for feeds  - please hold Eliquies for now pending PEG tube placement  - plan for  PEG tube placement endoscopically on Tuesday or Wednesday  - please keep NPO after midnight on Monday night 82 y/o F pmhx CVA on eliquis with R side contraction at baseline, nonverbal, presenting with son and granddaughter for clogged PEG tube. GI now consulted for replacement.    1) Oropharyngeal dysphagia now requiring PEG replacement after dislodgement of tube    - please place NGT for feeds  - please hold Eliquies for now pending PEG tube placement  - plan for  PEG tube placement endoscopically on Tuesday or Wednesday  - please keep NPO after midnight on Monday night

## 2019-05-19 NOTE — CONSULT NOTE ADULT - SUBJECTIVE AND OBJECTIVE BOX
Chief Complaint:  Patient is a 83y old  Female who presents with a chief complaint of     HPI:  84 y/o F pmhx CVA with R side contraction at baseline, nonverbal, presenting with son and granddaughter for clogged PEG tube. GI now consulted for replacement.    Patient was here x1mo ago with same issue, had 20F at that time which was replaced with a 14F. Family stating patient has no close follow-up with specialist who follows her tube, and has come to the ED twice since the time it was placed x1.5 years ago for replacements, has not had any follow-up with doctor who placed it at [place].     Reports last night when trying to administer medications and food through tube unable to pass tube. State patient does not take anything by mouth. Report patient also has been noted to have a concern hardware coming out of her R second toe from a surgery performed on this toe remotely. Family unsure of how long ago the surgery was performed or what the screw was placed for, and have noted over several months that the screw has been protruding from the tip of her toe. Denying any fevers, chills, spreading redness from the toe, severe noting of pain with touching the area or drainage.    In the ED, PEG tube was removed and new 14Fr was attempted but could not be passed after removal of previous tube. GI consulted for endoscopic PEG placement.    Allergies:  No Known Allergies      Home Medications:    Hospital Medications:      PMHX/PSHX:  PEG tube malfunction  CVA (cerebral vascular accident)  No significant past surgical history      Family history:  No pertinent family history in first degree relatives      Social History:     ROS:   unable to attain      PHYSICAL EXAM:     GENERAL:  NAD, nonverbal  HEENT:  sclera anicteric  CHEST:  Full & symmetric excursion  HEART:  Regular rhythm,  ABDOMEN:  Soft, non-tender, non-distended +previous PEG site  EXTREMITIES:  no cyanosis,clubbing or edema  SKIN:  No rash/erythema/ecchymoses/petechiae/wounds/abscess/warm/dry  NEURO:  Alert, oriented    Vital Signs:  Vital Signs Last 24 Hrs  T(C): 36.7 (19 May 2019 08:21), Max: 36.7 (19 May 2019 08:21)  T(F): 98 (19 May 2019 08:21), Max: 98 (19 May 2019 08:21)  HR: 74 (19 May 2019 08:21) (74 - 74)  BP: 140/85 (19 May 2019 08:21) (140/85 - 140/85)  BP(mean): --  RR: 19 (19 May 2019 08:21) (19 - 19)  SpO2: 97% (19 May 2019 08:21) (97% - 97%)  Daily Height in cm: 152.4 (19 May 2019 08:21)    Daily     LABS:                    Imaging: Chief Complaint:  Patient is a 83y old  Female who presents with a chief complaint of     HPI:  82 y/o F pmhx CVA on eliquis with R side contraction at baseline, nonverbal, presenting with son and granddaughter for clogged PEG tube. GI now consulted for replacement.    Patient was here x1mo ago with same issue, had 20F at that time which was replaced with a 14F. Family stating patient has no close follow-up with specialist who follows her tube, and has come to the ED twice since the time it was placed x1.5 years ago for replacements, has not had any follow-up with doctor who placed it at [place].     Reports last night when trying to administer medications and food through tube unable to pass tube. State patient does not take anything by mouth. Report patient also has been noted to have a concern hardware coming out of her R second toe from a surgery performed on this toe remotely. Family unsure of how long ago the surgery was performed or what the screw was placed for, and have noted over several months that the screw has been protruding from the tip of her toe. Denying any fevers, chills, spreading redness from the toe, severe noting of pain with touching the area or drainage.    In the ED, PEG tube was removed and new 14Fr was attempted but could not be passed after removal of previous tube. GI consulted for endoscopic PEG placement.    As per family, patient did not take eliquis on the day PTA and day of admission    Allergies:  No Known Allergies      Home Medications:    Hospital Medications:      PMHX/PSHX:  PEG tube malfunction  CVA (cerebral vascular accident)  No significant past surgical history      Family history:  No pertinent family history in first degree relatives      Social History:     ROS:   unable to attain      PHYSICAL EXAM:     GENERAL:  NAD, nonverbal  HEENT:  sclera anicteric  CHEST:  Full & symmetric excursion  HEART:  Regular rhythm,  ABDOMEN:  Soft, non-tender, non-distended +previous PEG site  EXTREMITIES:  no cyanosis,clubbing or edema  SKIN:  No rash/erythema/ecchymoses/petechiae/wounds/abscess/warm/dry  NEURO:  Alert, oriented    Vital Signs:  Vital Signs Last 24 Hrs  T(C): 36.7 (19 May 2019 08:21), Max: 36.7 (19 May 2019 08:21)  T(F): 98 (19 May 2019 08:21), Max: 98 (19 May 2019 08:21)  HR: 74 (19 May 2019 08:21) (74 - 74)  BP: 140/85 (19 May 2019 08:21) (140/85 - 140/85)  BP(mean): --  RR: 19 (19 May 2019 08:21) (19 - 19)  SpO2: 97% (19 May 2019 08:21) (97% - 97%)  Daily Height in cm: 152.4 (19 May 2019 08:21)    Daily     LABS:                    Imaging: Chief Complaint:  Patient is a 83y old  Female who presents with a chief complaint of     HPI:  84 y/o F pmhx CVA on eliquis with R side contraction at baseline, nonverbal, presenting with son and granddaughter for clogged PEG tube. GI now consulted for replacement.    Patient was here x1mo ago with same issue, had 20F at that time which was replaced with a 14F. Family stating patient has no close follow-up with specialist who follows her tube, and has come to the ED twice since the time it was placed x1.5 years ago for replacements, has not had any follow-up with doctor who placed it at [place].     Reports last night when trying to administer medications and food through tube unable to pass tube. State patient does not take anything by mouth. Report patient also has been noted to have a concern hardware coming out of her R second toe from a surgery performed on this toe remotely. Family unsure of how long ago the surgery was performed or what the screw was placed for, and have noted over several months that the screw has been protruding from the tip of her toe. Denying any fevers, chills, spreading redness from the toe, severe noting of pain with touching the area or drainage.    In the ED, PEG tube was removed and new 14Fr was attempted but could not be passed after removal of previous tube. GI consulted for endoscopic PEG placement.    As per family, patient did not take eliquis on the day PTA and day of admission    Allergies:  No Known Allergies      Home Medications:    Hospital Medications:      PMHX/PSHX:  PEG tube malfunction  CVA (cerebral vascular accident)  No significant past surgical history      Family history:  No pertinent family history in first degree relatives      Social History:     ROS:   unable to attain      PHYSICAL EXAM:     GENERAL:  NAD, nonverbal  HEENT:  sclera anicteric  CHEST:  Full & symmetric excursion  HEART:  Regular rhythm,  ABDOMEN:  Soft, non-tender, non-distended, two PEG sites seen (+previous PEG site with some trauama/blood, another PEG side, well healed)  EXTREMITIES:  no cyanosis,clubbing or edema  SKIN:  No rash/erythema/ecchymoses/petechiae/wounds/abscess/warm/dry  NEURO:  Alert, oriented    Vital Signs:  Vital Signs Last 24 Hrs  T(C): 36.7 (19 May 2019 08:21), Max: 36.7 (19 May 2019 08:21)  T(F): 98 (19 May 2019 08:21), Max: 98 (19 May 2019 08:21)  HR: 74 (19 May 2019 08:21) (74 - 74)  BP: 140/85 (19 May 2019 08:21) (140/85 - 140/85)  BP(mean): --  RR: 19 (19 May 2019 08:21) (19 - 19)  SpO2: 97% (19 May 2019 08:21) (97% - 97%)  Daily Height in cm: 152.4 (19 May 2019 08:21)    Daily     LABS:                    Imaging: Chief Complaint:  Patient is a 83y old  Female who presents with a chief complaint of     HPI:  82 y/o F pmhx CVA on eliquis with R side contraction at baseline, nonverbal, presenting with son and granddaughter for clogged PEG tube. GI now consulted for replacement.    Patient was here x1mo ago with same issue, had 20F at that time which was replaced with a 14F. Family stating patient has no close follow-up with specialist who follows her tube, and has come to the ED twice since the time it was placed x1.5 years ago for replacements, has not had any follow-up with doctor who placed it at [place].     Reports last night when trying to administer medications and food through tube unable to pass tube. State patient does not take anything by mouth. Report patient also has been noted to have a concern hardware coming out of her R second toe from a surgery performed on this toe remotely. Family unsure of how long ago the surgery was performed or what the screw was placed for, and have noted over several months that the screw has been protruding from the tip of her toe. Denying any fevers, chills, spreading redness from the toe, severe noting of pain with touching the area or drainage.    In the ED, PEG tube was removed and new 14Fr was attempted but could not be passed after removal of previous tube. GI consulted for endoscopic PEG placement.    As per family, patient did not take eliquis on the day PTA and day of admission    Allergies:  No Known Allergies        PMHX/PSHX:  PEG tube malfunction  CVA (cerebral vascular accident)  No significant past surgical history      Family history:  No pertinent family history in first degree relatives      Social History: No illicit drugs or ETOH    ROS:   unable to attain      PHYSICAL EXAM:     GENERAL:  NAD, nonverbal  HEENT:  sclera anicteric  CHEST:  Full & symmetric excursion  HEART:  Regular rhythm,  ABDOMEN:  Soft, non-tender, non-distended, two PEG sites seen (+previous PEG site with some trauama/blood, another PEG side, well healed)  EXTREMITIES:  no cyanosis,clubbing or edema  SKIN:  No rash/erythema/ecchymoses/petechiae/wounds/abscess/warm/dry  NEURO:  Alert, oriented    Vital Signs:  Vital Signs Last 24 Hrs  T(C): 36.7 (19 May 2019 08:21), Max: 36.7 (19 May 2019 08:21)  T(F): 98 (19 May 2019 08:21), Max: 98 (19 May 2019 08:21)  HR: 74 (19 May 2019 08:21) (74 - 74)  BP: 140/85 (19 May 2019 08:21) (140/85 - 140/85)  BP(mean): --  RR: 19 (19 May 2019 08:21) (19 - 19)  SpO2: 97% (19 May 2019 08:21) (97% - 97%)  Daily Height in cm: 152.4 (19 May 2019 08:21)    Daily     LABS:    Complete Blood Count in AM (05.20.19 @ 09:21)    WBC Count: 7.32 K/uL    RBC Count: 4.02 M/uL    Hemoglobin: 13.4 g/dL    Hematocrit: 40.0 %    Mean Cell Volume: 99.5 fl    Mean Cell Hemoglobin: 33.3 pg    Mean Cell Hemoglobin Conc: 33.5 gm/dL    Red Cell Distrib Width: 11.8 %    Platelet Count - Automated: 185 K/uL    Basic Metabolic Panel in AM (05.20.19 @ 07:11)    Sodium, Serum: 136 mmol/L    Potassium, Serum: 4.0 mmol/L    Chloride, Serum: 100 mmol/L    Carbon Dioxide, Serum: 25 mmol/L    Anion Gap, Serum: 11 mmol/L    Blood Urea Nitrogen, Serum: 12 mg/dL    Creatinine, Serum: 0.43 mg/dL    Glucose, Serum: 126 mg/dL    Calcium, Total Serum: 8.8 mg/dL    eGFR if Non : 95: Interpretative comment  The units for eGFR are mL/min/1.73M2 (normalized body surface area). The  eGFR is calculated from a serum creatinine using the CKD-EPI equation.  Other variables required for calculation are race, age and sex. Among  patients with chronic kidney disease (CKD), the eGFR is useful in  determining the stage of disease according to KDOQI CKD classification.  All eGFR results are reported numerically with the following  interpretation.          GFR                    With                 Without     (ml/min/1.73 m2)    Kidney Damage       Kidney Damage        >= 90                    Stage 1                     Normal        60-89                    Stage 2                     Decreased GFR        30-59     Stage 3                     Stage 3        15-29                    Stage 4                     Stage 4        < 15                      Stage 5                     Stage 5  Each stage of CKD assumes that the associated GFR level has been in  effect for at least 3 months. Determination of stages one and two (with  eGFR > 59 ml/min/m2) requires estimation of kidney damage for at least 3  months as defined by structural or functional abnormalities.  Limitations: All estimates of GFR will be less accurate for patients at  extremes of muscle mass (including but not limited to frail elderly,  critically ill, or cancer patients), those with unusual diets, and those  with conditions associated with reduced secretion or extrarenal  elimination of creatinine. The eGFR equation is not recommended for use  in patients with unstable creatinine levels. mL/min/1.73M2    eGFR if African American: 111 mL/min/1.73M2

## 2019-05-20 LAB
ANION GAP SERPL CALC-SCNC: 11 MMOL/L — SIGNIFICANT CHANGE UP (ref 5–17)
BUN SERPL-MCNC: 12 MG/DL — SIGNIFICANT CHANGE UP (ref 7–23)
CALCIUM SERPL-MCNC: 8.8 MG/DL — SIGNIFICANT CHANGE UP (ref 8.4–10.5)
CHLORIDE SERPL-SCNC: 100 MMOL/L — SIGNIFICANT CHANGE UP (ref 96–108)
CO2 SERPL-SCNC: 25 MMOL/L — SIGNIFICANT CHANGE UP (ref 22–31)
CREAT SERPL-MCNC: 0.43 MG/DL — LOW (ref 0.5–1.3)
GLUCOSE SERPL-MCNC: 126 MG/DL — HIGH (ref 70–99)
HCT VFR BLD CALC: 40 % — SIGNIFICANT CHANGE UP (ref 34.5–45)
HGB BLD-MCNC: 13.4 G/DL — SIGNIFICANT CHANGE UP (ref 11.5–15.5)
MAGNESIUM SERPL-MCNC: 2 MG/DL — SIGNIFICANT CHANGE UP (ref 1.6–2.6)
MCHC RBC-ENTMCNC: 33.3 PG — SIGNIFICANT CHANGE UP (ref 27–34)
MCHC RBC-ENTMCNC: 33.5 GM/DL — SIGNIFICANT CHANGE UP (ref 32–36)
MCV RBC AUTO: 99.5 FL — SIGNIFICANT CHANGE UP (ref 80–100)
PLATELET # BLD AUTO: 185 K/UL — SIGNIFICANT CHANGE UP (ref 150–400)
POTASSIUM SERPL-MCNC: 4 MMOL/L — SIGNIFICANT CHANGE UP (ref 3.5–5.3)
POTASSIUM SERPL-SCNC: 4 MMOL/L — SIGNIFICANT CHANGE UP (ref 3.5–5.3)
RBC # BLD: 4.02 M/UL — SIGNIFICANT CHANGE UP (ref 3.8–5.2)
RBC # FLD: 11.8 % — SIGNIFICANT CHANGE UP (ref 10.3–14.5)
SODIUM SERPL-SCNC: 136 MMOL/L — SIGNIFICANT CHANGE UP (ref 135–145)
WBC # BLD: 7.32 K/UL — SIGNIFICANT CHANGE UP (ref 3.8–10.5)
WBC # FLD AUTO: 7.32 K/UL — SIGNIFICANT CHANGE UP (ref 3.8–10.5)

## 2019-05-20 PROCEDURE — 99222 1ST HOSP IP/OBS MODERATE 55: CPT | Mod: GC

## 2019-05-20 RX ADMIN — Medication 25 MILLIGRAM(S): at 11:22

## 2019-05-20 RX ADMIN — SODIUM CHLORIDE 80 MILLILITER(S): 9 INJECTION, SOLUTION INTRAVENOUS at 21:27

## 2019-05-20 RX ADMIN — Medication 0.1 MILLIGRAM(S): at 13:26

## 2019-05-20 NOTE — PROGRESS NOTE ADULT - ASSESSMENT
83F PMH of CVA with right sided deficits RUE>RLE resulting in dementia with behavioral disturbance and dysphagia requiring PEG tube, Afib on Eliquis who presents for dislodged PEG tube.

## 2019-05-20 NOTE — PHYSICAL THERAPY INITIAL EVALUATION ADULT - MANUAL MUSCLE TESTING RESULTS, REHAB EVAL
Strength is grossly at least 3/5 throughout; unable to assess RUE and R knee 2/5/grossly assessed due to

## 2019-05-20 NOTE — PROGRESS NOTE ADULT - SUBJECTIVE AND OBJECTIVE BOX
Patient is a 83y old  Female who presents with a chief complaint of PEG tube displaced (19 May 2019 15:19)  patient known to me, assigned this AM to assume care  chart reviewed and events thus far noted  admitted overnight by full time hospitalist service     SUBJECTIVE / OVERNIGHT EVENTS: overnight events noted    ROS: per RN  Resp: No cough no sputum production  CVS: No chest pain no palpitations no orthopnea  GI: no N/V/D          MEDICATIONS  (STANDING):  dextrose 5% + sodium chloride 0.45%. 1000 milliLiter(s) (80 mL/Hr) IV Continuous <Continuous>  digoxin  Injectable 0.1 milliGRAM(s) IV Push daily  valproate sodium IVPB 250 milliGRAM(s) IV Intermittent daily    MEDICATIONS  (PRN):        CAPILLARY BLOOD GLUCOSE        I&O's Summary    19 May 2019 07:01  -  20 May 2019 07:00  --------------------------------------------------------  IN: 1040 mL / OUT: 0 mL / NET: 1040 mL    20 May 2019 07:01  -  20 May 2019 11:59  --------------------------------------------------------  IN: 240 mL / OUT: 0 mL / NET: 240 mL        Vital Signs Last 24 Hrs  T(C): 36.2 (20 May 2019 10:34), Max: 36.9 (19 May 2019 17:46)  T(F): 97.2 (20 May 2019 10:34), Max: 98.5 (19 May 2019 17:46)  HR: 61 (20 May 2019 10:34) (55 - 81)  BP: 134/72 (20 May 2019 10:34) (113/65 - 147/78)  BP(mean): --  RR: 18 (20 May 2019 10:34) (16 - 18)  SpO2: 97% (20 May 2019 10:34) (96% - 98%)    PHYSICAL EXAM:  GENERAL: NAD, asthenic  HEAD:  Atraumatic, Normocephalic  EYES: EOMI, PERRLA, conjunctiva and sclera clear  NECK: Supple, No JVD  CHEST/LUNG: no rhonchi, no wheeze, clear to auscultation bilaterally  HEART: S1 S2; soft ejection systolic murmur best heard at left sternal border No rubs or gallops  ABDOMEN: Soft, Nontender, Nondistended; Bowel sounds present. PEG site clean  EXTREMITIES:  No clubbing or cyanosis, + Peripheral Pulses,  no edema  right hallux with visible screw in place, no drainage  PSYCH: confused  NEUROLOGY: contracted RUE  patient not cooperative  SKIN: No rashes or lesions    LABS:                        13.4   7.32  )-----------( 185      ( 20 May 2019 09:21 )             40.0     05-20    136  |  100  |  12  ----------------------------<  126<H>  4.0   |  25  |  0.43<L>    Ca    8.8      20 May 2019 07:11  Mg     2.0     05-20    TPro  7.1  /  Alb  4.0  /  TBili  0.8  /  DBili  x   /  AST  64<H>  /  ALT  45  /  AlkPhos  58  05-19    PT/INR - ( 19 May 2019 11:49 )   PT: 12.9 sec;   INR: 1.13 ratio         PTT - ( 19 May 2019 11:49 )  PTT:34.5 sec            All consultant(s) notes reviewed and care discussed with other providers        Contact Number, Dr Sharma [phone]

## 2019-05-20 NOTE — PHYSICAL THERAPY INITIAL EVALUATION ADULT - ACTIVE RANGE OF MOTION EXAMINATION, REHAB EVAL
RUE ROM limited as pt's RUE is contracted. R knee extension AROM limited./bilateral  lower extremity Active ROM was WFL (within functional limits)/Left UE Active ROM was WFL (within functional limits)

## 2019-05-20 NOTE — CONSULT NOTE ADULT - SUBJECTIVE AND OBJECTIVE BOX
Patient is a 83y old  Female who presents with a chief complaint of PEG tube displaced (20 May 2019 11:58)      HPI:  83F pmhx of CVA with right sided deficits RUE>RLE resulting in dementia with behavioral disturbance and dysphagia requiring PEG tube, Afib on Eliquis who presents for dislodged PEG tube.  They were trying to give meds yesterday and it was clogged and per pt's son came out.  PEG was placed by GI in 2018 for dysphagia 2/2 stroke.  She also has a screw protruding from her right 2nd toe which her son says has been there a long time.     In ED:  T 98.2 HR 81 /81 RR 16 O2 97%   GI called for PEG eval. (19 May 2019 15:19)      PAST MEDICAL & SURGICAL HISTORY:  PEG tube malfunction  CVA (cerebral vascular accident)  No significant past surgical history      MEDICATIONS  (STANDING):  dextrose 5% + sodium chloride 0.45%. 1000 milliLiter(s) (80 mL/Hr) IV Continuous <Continuous>  digoxin  Injectable 0.1 milliGRAM(s) IV Push daily  valproate sodium IVPB 250 milliGRAM(s) IV Intermittent daily    MEDICATIONS  (PRN):      Allergies    No Known Allergies    Intolerances        VITALS:    Vital Signs Last 24 Hrs  T(C): 36.2 (20 May 2019 10:34), Max: 36.9 (19 May 2019 17:46)  T(F): 97.2 (20 May 2019 10:34), Max: 98.5 (19 May 2019 17:46)  HR: 61 (20 May 2019 10:34) (55 - 81)  BP: 134/72 (20 May 2019 10:34) (113/65 - 147/78)  BP(mean): --  RR: 18 (20 May 2019 10:34) (16 - 18)  SpO2: 97% (20 May 2019 10:34) (96% - 98%)    LABS:                          13.4   7.32  )-----------( 185      ( 20 May 2019 09:21 )             40.0       05-20    136  |  100  |  12  ----------------------------<  126<H>  4.0   |  25  |  0.43<L>    Ca    8.8      20 May 2019 07:11  Mg     2.0     05-20    TPro  7.1  /  Alb  4.0  /  TBili  0.8  /  DBili  x   /  AST  64<H>  /  ALT  45  /  AlkPhos  58  05-19      CAPILLARY BLOOD GLUCOSE          PT/INR - ( 19 May 2019 11:49 )   PT: 12.9 sec;   INR: 1.13 ratio         PTT - ( 19 May 2019 11:49 )  PTT:34.5 sec    LOWER EXTREMITY PHYSICAL EXAM:    Vascular: DP/PT 2/4, B/L, CFT <3 seconds B/L, Temperature gradient WNL, B/L.   Neuro: Epicritic sensation unable to assess but pt noted to withdraw on exam.  Skin: 2nd toe right foot with 2nd toe screw head exposed cross head, no SOI no drainage, screw well seated no mobility with attempt to retrieve with sterile hemostat, stable eschar dorsal 2nd PIPJ      RADIOLOGY & ADDITIONAL STUDIES:

## 2019-05-20 NOTE — PHYSICAL THERAPY INITIAL EVALUATION ADULT - NS ASR WT BEARING DETAIL RLE
There are no preventive care reminders to display for this patient.    Patient is up to date, no discussion needed.             weight-bearing as tolerated/in cast shoe

## 2019-05-20 NOTE — PHYSICAL THERAPY INITIAL EVALUATION ADULT - DISCHARGE DISPOSITION, PT EVAL
TBD once functional eval is completed. home w/ home PT/Pt has a HHA for 6 hrs/5-6 days and family assists pt when HHA is not there./home w/ assist

## 2019-05-20 NOTE — PHYSICAL THERAPY INITIAL EVALUATION ADULT - PRECAUTIONS/LIMITATIONS, REHAB EVAL
+R foot x-ray 5/19/19: A surgical screw in the second toe appears to protrude through the skin surface. Fragmentation and displacement of portions of the distal phalanx of the second toe. Soft tissue swelling of the second toe. Nonspecific lucencies around the surgical screw at the second toe middle phalanx. Surgical screw in the head of the second metatarsal. Chronic appearing irregularity at the distal aspect of the first metatarsal. Rounded appearance of the distal aspect of the fifth toe proximal phalanx. fall precautions/+R foot x-ray 5/19/19: A surgical screw in the second toe appears to protrude through the skin surface. Fragmentation and displacement of portions of the distal phalanx of the second toe. Soft tissue swelling of the second toe. Nonspecific lucencies around the surgical screw at the second toe middle phalanx. Surgical screw in the head of the second metatarsal. Chronic appearing irregularity at the distal aspect of the first metatarsal. Rounded appearance of the distal aspect of the fifth toe proximal phalanx.

## 2019-05-20 NOTE — PHYSICAL THERAPY INITIAL EVALUATION ADULT - PERTINENT HX OF CURRENT PROBLEM, REHAB EVAL
Pt is a 83 y.o. female with pmhx CVA with R side contraction at baseline, nonverbal, presenting with son and granddaughter for clogged PEG tube. Reports when trying to administer medications and food through tube unable to pass tube. Continued below.

## 2019-05-20 NOTE — PHYSICAL THERAPY INITIAL EVALUATION ADULT - IMPAIRMENTS CONTRIBUTING IMPAIRED BED MOBILITY, REHAB EVAL
narrow base of support/impaired balance/cognition/decreased strength/decreased ROM/decreased flexibility

## 2019-05-20 NOTE — PHYSICAL THERAPY INITIAL EVALUATION ADULT - PLANNED THERAPY INTERVENTIONS, PT EVAL
strengthening/transfer training/GOAL: Stair Negotiation Training: Patient will be able to negotiate up & down 2 steps with unilateral rail, step to gait pattern, in 2 weeks./balance training/gait training/bed mobility training

## 2019-05-20 NOTE — PHYSICAL THERAPY INITIAL EVALUATION ADULT - ADDITIONAL COMMENTS
Pt's granddaughter Eve states pt lives with family in a house with 1-2 steps to enter, +HR. Pt requires assistance for all functional mobility and ADLs. Pt has a HHA for 6 hrs/5-6 days and family assists pt when HHA is not there. Pt ambulated ~40 ft without AD and with assistance. Pt has a wheelchair.

## 2019-05-20 NOTE — PHYSICAL THERAPY INITIAL EVALUATION ADULT - GAIT TRAINING, PT EVAL
GOAL: Patient will ambulate 50 feet with appropriate assistive device as needed with mod Ax1, in 2 weeks.

## 2019-05-21 LAB
ANION GAP SERPL CALC-SCNC: 10 MMOL/L — SIGNIFICANT CHANGE UP (ref 5–17)
BUN SERPL-MCNC: 7 MG/DL — SIGNIFICANT CHANGE UP (ref 7–23)
CALCIUM SERPL-MCNC: 8.8 MG/DL — SIGNIFICANT CHANGE UP (ref 8.4–10.5)
CHLORIDE SERPL-SCNC: 101 MMOL/L — SIGNIFICANT CHANGE UP (ref 96–108)
CO2 SERPL-SCNC: 23 MMOL/L — SIGNIFICANT CHANGE UP (ref 22–31)
CREAT SERPL-MCNC: 0.41 MG/DL — LOW (ref 0.5–1.3)
ERYTHROCYTE [SEDIMENTATION RATE] IN BLOOD: 8 MM/HR — SIGNIFICANT CHANGE UP (ref 0–20)
GLUCOSE SERPL-MCNC: 136 MG/DL — HIGH (ref 70–99)
HCT VFR BLD CALC: 41.3 % — SIGNIFICANT CHANGE UP (ref 34.5–45)
HGB BLD-MCNC: 14.4 G/DL — SIGNIFICANT CHANGE UP (ref 11.5–15.5)
INR BLD: 1.04 RATIO — SIGNIFICANT CHANGE UP (ref 0.88–1.16)
MCHC RBC-ENTMCNC: 33.1 PG — SIGNIFICANT CHANGE UP (ref 27–34)
MCHC RBC-ENTMCNC: 34.9 GM/DL — SIGNIFICANT CHANGE UP (ref 32–36)
MCV RBC AUTO: 94.9 FL — SIGNIFICANT CHANGE UP (ref 80–100)
PLATELET # BLD AUTO: 186 K/UL — SIGNIFICANT CHANGE UP (ref 150–400)
POTASSIUM SERPL-MCNC: 3.8 MMOL/L — SIGNIFICANT CHANGE UP (ref 3.5–5.3)
POTASSIUM SERPL-SCNC: 3.8 MMOL/L — SIGNIFICANT CHANGE UP (ref 3.5–5.3)
PROTHROM AB SERPL-ACNC: 11.8 SEC — SIGNIFICANT CHANGE UP (ref 10–13.1)
RBC # BLD: 4.35 M/UL — SIGNIFICANT CHANGE UP (ref 3.8–5.2)
RBC # FLD: 11.6 % — SIGNIFICANT CHANGE UP (ref 10.3–14.5)
SODIUM SERPL-SCNC: 134 MMOL/L — LOW (ref 135–145)
WBC # BLD: 6.74 K/UL — SIGNIFICANT CHANGE UP (ref 3.8–10.5)
WBC # FLD AUTO: 6.74 K/UL — SIGNIFICANT CHANGE UP (ref 3.8–10.5)

## 2019-05-21 PROCEDURE — 99223 1ST HOSP IP/OBS HIGH 75: CPT | Mod: GC

## 2019-05-21 PROCEDURE — 43246 EGD PLACE GASTROSTOMY TUBE: CPT | Mod: GC

## 2019-05-21 RX ADMIN — Medication 25 MILLIGRAM(S): at 12:59

## 2019-05-21 RX ADMIN — Medication 0.1 MILLIGRAM(S): at 12:59

## 2019-05-21 NOTE — CHART NOTE - NSCHARTNOTEFT_GEN_A_CORE
follow up- pt with peg tube placed by GI;  abdominal binder in place. per RN , pt is trying to pull out the tube using left hand ; safety check  and attempted to divert pt attention. unsuccessful trial; place  unsecured left mitten for now; if no attempts to pull the tube, then discontinue mitten, d/w RN;   Myra Lopez(NP)  [address], [phone]

## 2019-05-21 NOTE — PROGRESS NOTE ADULT - ASSESSMENT
82yo F w/ right foot exposed hardware 2nd toe no original DOS as pt is nonverbal  ·	Pt seen & evaluated  ·	Attempted screw retrieval with , unable to retrieve  ·	On attempting removal, dorsal ulceration/eschar deroofed with +probe to bone/hardware.  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 conservative care.   ·	No need for emergent surgery, however toe amputation on this admission would be favorable.   ·	Will follow

## 2019-05-21 NOTE — CONSULT NOTE ADULT - SUBJECTIVE AND OBJECTIVE BOX
Patient is a 83y old  Female who presents with a chief complaint of PEG tube displaced (21 May 2019 11:31)      HPI:  83F pmhx of CVA with right sided deficits RUE>RLE resulting in dementia with behavioral disturbance and dysphagia requiring PEG tube, Afib on Eliquis who presents for dislodged PEG tube.  They were trying to give meds yesterday and it was clogged and per pt's son came out.  PEG was placed by GI in 2018 for dysphagia 2/2 stroke.  She also has a screw protruding from her right 2nd toe which her son says has been there a long time.     In ED:  T 98.2 HR 81 /81 RR 16 O2 97%   GI called for PEG eval. (19 May 2019 15:19)    ID consulted to evaluate exposed screw/possible chronic OM.       PAST MEDICAL & SURGICAL HISTORY:  PEG tube malfunction  CVA (cerebral vascular accident)  No significant past surgical history      Allergies  No Known Allergies        ANTIMICROBIALS:      MEDICATIONS  (STANDING):        OTHER MEDS: MEDICATIONS  (STANDING):  digoxin  Injectable 0.1 daily  valproate sodium IVPB 250 daily      SOCIAL HISTORY:     No smoking, ETOH or drug use  FAMILY HISTORY:  No pertinent family history in first degree relatives      REVIEW OF SYSTEMS  [  X] ROS unobtainable because:  dementia  [  ] All other systems negative except as noted below:	    Constitutional:  [ ] fever [ ] chills  [ ] weight loss  [ ] weakness  Skin:  [ ] rash [ ] phlebitis	  Eyes: [ ] icterus [ ] pain  [ ] discharge	  ENMT: [ ] sore throat  [ ] thrush [ ] ulcers [ ] exudates  Respiratory: [ ] dyspnea [ ] hemoptysis [ ] cough [ ] sputum	  Cardiovascular:  [ ] chest pain [ ] palpitations [ ] edema	  Gastrointestinal:  [ ] nausea [ ] vomiting [ ] diarrhea [ ] constipation [ ] pain	  Genitourinary:  [ ] dysuria [ ] frequency [ ] hematuria [ ] discharge [ ] flank pain  [ ] incontinence  Musculoskeletal:  [ ] myalgias [ ] arthralgias [ ] arthritis  [ ] back pain  Neurological:  [ ] headache [ ] seizures  [ ] confusion/altered mental status  Psychiatric:  [ ] anxiety [ ] depression	  Hematology/Lymphatics:  [ ] lymphadenopathy  Endocrine:  [ ] adrenal [ ] thyroid  Allergic/Immunologic:	 [ ] transplant [ ] seasonal    Vital Signs Last 24 Hrs  T(F): 97.4 (05-21-19 @ 12:57), Max: 98.5 (05-19-19 @ 17:46)    Vital Signs Last 24 Hrs  HR: 80 (05-21-19 @ 12:57) (66 - 80)  BP: 165/85 (05-21-19 @ 12:57) (121/71 - 165/85)  RR: 18 (05-21-19 @ 12:57)  SpO2: 95% (05-21-19 @ 12:57) (93% - 98%)  Wt(kg): --    PHYSICAL EXAM:  General: non-toxic  HEAD/EYES: anicteric, PERRL  ENT:  supple  Cardiovascular:   S1, S2  Respiratory:  clear bilaterally  GI:  soft, non-tender, normal bowel sounds  :  no CVA tenderness   Musculoskeletal:  no synovitis  Neurologic:  grossly non-focal  Skin:  no rash  Lymph: no lymphadenopathy  Psychiatric:  appropriate affect  Vascular:  no phlebitis          WBC Count: 6.74 K/uL (05-21 @ 08:54)  WBC Count: 7.32 K/uL (05-20 @ 09:21)  WBC Count: 6.9 K/uL (05-19 @ 11:49)                            14.4   6.74  )-----------( 186      ( 21 May 2019 08:54 )             41.3       05-21    134<L>  |  101  |  7   ----------------------------<  136<H>  3.8   |  23  |  0.41<L>    Ca    8.8      21 May 2019 07:04  Mg     2.0     05-20        Creatinine Trend: 0.41<--, 0.43<--, 0.46<--        MICROBIOLOGY:        RADIOLOGY:    < from: Xray Foot AP + Lateral + Oblique, Right (05.19.19 @ 17:34) >  IMPRESSION:   A surgical screw in the second toe appears to protrude through the skin   surface. Correlate clinically. There is fragmentation and displacement of   portions of the distal phalanx of the second toe. Soft tissue swelling of   the second toe. Nonspecific lucencies around the surgical screw at the   second toe middle phalanx. Question loosening or infection. If clinically   indicated, correlation with cross-sectional imaging could be performed.    Surgical screw in the head of the second metatarsal. Chronic appearing   irregularity at the distal aspect of the first metatarsal, question prior   surgical change or trauma. Rounded appearance of the distal aspect of the   fifth toe proximal phalanx, question prior surgical change.    < end of copied text > Patient is a 83y old  Female who presents with a chief complaint of PEG tube displaced (21 May 2019 11:31)      HPI:  83F pmhx of CVA with right sided deficits RUE>RLE resulting in dementia with behavioral disturbance and dysphagia requiring PEG tube, Afib on Eliquis who presents for dislodged PEG tube.  They were trying to give meds yesterday and it was clogged and per pt's son came out.  PEG was placed by GI in 2018 for dysphagia 2/2 stroke.  She also has a screw protruding from her right 2nd toe which her son says has been there a long time.     In ED:  T 98.2 HR 81 /81 RR 16 O2 97%   GI called for PEG eval. (19 May 2019 15:19)    ID consulted to evaluate exposed screw/possible chronic OM.       PAST MEDICAL & SURGICAL HISTORY:  PEG tube malfunction  CVA (cerebral vascular accident)  No significant past surgical history      Allergies  No Known Allergies        ANTIMICROBIALS:      MEDICATIONS  (STANDING):        OTHER MEDS: MEDICATIONS  (STANDING):  digoxin  Injectable 0.1 daily  valproate sodium IVPB 250 daily      SOCIAL HISTORY:     No smoking, ETOH or drug use  FAMILY HISTORY:  No pertinent family history in first degree relatives      REVIEW OF SYSTEMS  [  X] ROS unobtainable because:  dementia  [  ] All other systems negative except as noted below:	    Constitutional:  [ ] fever [ ] chills  [ ] weight loss  [ ] weakness  Skin:  [ ] rash [ ] phlebitis	  Eyes: [ ] icterus [ ] pain  [ ] discharge	  ENMT: [ ] sore throat  [ ] thrush [ ] ulcers [ ] exudates  Respiratory: [ ] dyspnea [ ] hemoptysis [ ] cough [ ] sputum	  Cardiovascular:  [ ] chest pain [ ] palpitations [ ] edema	  Gastrointestinal:  [ ] nausea [ ] vomiting [ ] diarrhea [ ] constipation [ ] pain	  Genitourinary:  [ ] dysuria [ ] frequency [ ] hematuria [ ] discharge [ ] flank pain  [ ] incontinence  Musculoskeletal:  [ ] myalgias [ ] arthralgias [ ] arthritis  [ ] back pain  Neurological:  [ ] headache [ ] seizures  [ ] confusion/altered mental status  Psychiatric:  [ ] anxiety [ ] depression	  Hematology/Lymphatics:  [ ] lymphadenopathy  Endocrine:  [ ] adrenal [ ] thyroid  Allergic/Immunologic:	 [ ] transplant [ ] seasonal    Vital Signs Last 24 Hrs  T(F): 97.4 (05-21-19 @ 12:57), Max: 98.5 (05-19-19 @ 17:46)    Vital Signs Last 24 Hrs  HR: 80 (05-21-19 @ 12:57) (66 - 80)  BP: 165/85 (05-21-19 @ 12:57) (121/71 - 165/85)  RR: 18 (05-21-19 @ 12:57)  SpO2: 95% (05-21-19 @ 12:57) (93% - 98%)  Wt(kg): --    PHYSICAL EXAM:  General: non-toxic  HEAD/EYES: anicteric, PERRL  ENT:  supple  Cardiovascular:   S1, S2  Respiratory:  clear bilaterally  GI:  soft, non-tender, normal bowel sounds  :  no CVA tenderness   Musculoskeletal:  tight 2nd toe with exposed screw and black eschar without surrounding cellulitis   Neurologic: no speech, not following commands,   Skin:  no rash  Lymph: no lymphadenopathy  Vascular:  no phlebitis          WBC Count: 6.74 K/uL (05-21 @ 08:54)  WBC Count: 7.32 K/uL (05-20 @ 09:21)  WBC Count: 6.9 K/uL (05-19 @ 11:49)                            14.4   6.74  )-----------( 186      ( 21 May 2019 08:54 )             41.3       05-21    134<L>  |  101  |  7   ----------------------------<  136<H>  3.8   |  23  |  0.41<L>    Ca    8.8      21 May 2019 07:04  Mg     2.0     05-20        Creatinine Trend: 0.41<--, 0.43<--, 0.46<--        MICROBIOLOGY:        RADIOLOGY:    < from: Xray Foot AP + Lateral + Oblique, Right (05.19.19 @ 17:34) >  IMPRESSION:   A surgical screw in the second toe appears to protrude through the skin   surface. Correlate clinically. There is fragmentation and displacement of   portions of the distal phalanx of the second toe. Soft tissue swelling of   the second toe. Nonspecific lucencies around the surgical screw at the   second toe middle phalanx. Question loosening or infection. If clinically   indicated, correlation with cross-sectional imaging could be performed.    Surgical screw in the head of the second metatarsal. Chronic appearing   irregularity at the distal aspect of the first metatarsal, question prior   surgical change or trauma. Rounded appearance of the distal aspect of the   fifth toe proximal phalanx, question prior surgical change.    < end of copied text > Patient is a 83y old  Female who presents with a chief complaint of PEG tube displaced (21 May 2019 11:31)      HPI:  83F pmhx of CVA with right sided deficits RUE>RLE resulting in dementia with behavioral disturbance and dysphagia requiring PEG tube, Afib on Eliquis who presents for dislodged PEG tube.  They were trying to give meds yesterday and it was clogged and per pt's son came out.  PEG was placed by GI in 2018 for dysphagia 2/2 stroke.  She also has a screw protruding from her right 2nd toe which her son says has been there a long time.     In ED:  T 98.2 HR 81 /81 RR 16 O2 97%   GI called for PEG eval. (19 May 2019 15:19)    ID consulted to evaluate exposed screw/possible chronic OM.       PAST MEDICAL & SURGICAL HISTORY:  PEG tube malfunction  CVA (cerebral vascular accident)  No significant past surgical history      Allergies  No Known Allergies        ANTIMICROBIALS:      MEDICATIONS  (STANDING):        OTHER MEDS: MEDICATIONS  (STANDING):  digoxin  Injectable 0.1 daily  valproate sodium IVPB 250 daily      SOCIAL HISTORY:     No smoking, ETOH or drug use  FAMILY HISTORY:  No pertinent family history in first degree relatives      REVIEW OF SYSTEMS  [  X] ROS unobtainable because:  dementia  [  ] All other systems negative except as noted below:	    Constitutional:  [ ] fever [ ] chills  [ ] weight loss  [ ] weakness  Skin:  [ ] rash [ ] phlebitis	  Eyes: [ ] icterus [ ] pain  [ ] discharge	  ENMT: [ ] sore throat  [ ] thrush [ ] ulcers [ ] exudates  Respiratory: [ ] dyspnea [ ] hemoptysis [ ] cough [ ] sputum	  Cardiovascular:  [ ] chest pain [ ] palpitations [ ] edema	  Gastrointestinal:  [ ] nausea [ ] vomiting [ ] diarrhea [ ] constipation [ ] pain	  Genitourinary:  [ ] dysuria [ ] frequency [ ] hematuria [ ] discharge [ ] flank pain  [ ] incontinence  Musculoskeletal:  [ ] myalgias [ ] arthralgias [ ] arthritis  [ ] back pain  Neurological:  [ ] headache [ ] seizures  [ ] confusion/altered mental status  Psychiatric:  [ ] anxiety [ ] depression	  Hematology/Lymphatics:  [ ] lymphadenopathy  Endocrine:  [ ] adrenal [ ] thyroid  Allergic/Immunologic:	 [ ] transplant [ ] seasonal    Vital Signs Last 24 Hrs  T(F): 97.4 (05-21-19 @ 12:57), Max: 98.5 (05-19-19 @ 17:46)    Vital Signs Last 24 Hrs  HR: 80 (05-21-19 @ 12:57) (66 - 80)  BP: 165/85 (05-21-19 @ 12:57) (121/71 - 165/85)  RR: 18 (05-21-19 @ 12:57)  SpO2: 95% (05-21-19 @ 12:57) (93% - 98%)  Wt(kg): --    PHYSICAL EXAM:  General: non-toxic  HEAD/EYES: anicteric, PERRL  ENT:  supple  Cardiovascular:   S1, S2  Respiratory:  clear bilaterally  GI:  soft, non-tender, normal bowel sounds, + peg tube   :  no CVA tenderness   Musculoskeletal:  tight 2nd toe with exposed screw and black eschar without surrounding cellulitis   Neurologic: no speech, not following commands,   Skin:  no rash  Lymph: no lymphadenopathy  Vascular:  no phlebitis          WBC Count: 6.74 K/uL (05-21 @ 08:54)  WBC Count: 7.32 K/uL (05-20 @ 09:21)  WBC Count: 6.9 K/uL (05-19 @ 11:49)                            14.4   6.74  )-----------( 186      ( 21 May 2019 08:54 )             41.3       05-21    134<L>  |  101  |  7   ----------------------------<  136<H>  3.8   |  23  |  0.41<L>    Ca    8.8      21 May 2019 07:04  Mg     2.0     05-20        Creatinine Trend: 0.41<--, 0.43<--, 0.46<--        MICROBIOLOGY:        RADIOLOGY:    < from: Xray Foot AP + Lateral + Oblique, Right (05.19.19 @ 17:34) >  IMPRESSION:   A surgical screw in the second toe appears to protrude through the skin   surface. Correlate clinically. There is fragmentation and displacement of   portions of the distal phalanx of the second toe. Soft tissue swelling of   the second toe. Nonspecific lucencies around the surgical screw at the   second toe middle phalanx. Question loosening or infection. If clinically   indicated, correlation with cross-sectional imaging could be performed.    Surgical screw in the head of the second metatarsal. Chronic appearing   irregularity at the distal aspect of the first metatarsal, question prior   surgical change or trauma. Rounded appearance of the distal aspect of the   fifth toe proximal phalanx, question prior surgical change.    < end of copied text >

## 2019-05-21 NOTE — CONSULT NOTE ADULT - ASSESSMENT
83F PMH of CVA with right sided deficits RUE>RLE resulting in dementia with behavioral disturbance and dysphagia requiring PEG tube, Afib on Eliquis who presents for dislodged PEG tube.  Found to have an exposed screw from the 2nd toe.   Xray confirms screw placement   Podiatry feels this should probably be amputated nonemergent   WBC, ESR, Vital all normal 83F PMH of CVA with right sided deficits RUE>RLE resulting in dementia with behavioral disturbance and dysphagia requiring PEG tube, Afib on Eliquis who presents for dislodged PEG tube.  Found to have an exposed screw from the 2nd toe.   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 Abx are only used to treat acute exacebations and for a short period of time. Given the chronicity, the stable vitals and labs there is no role for antibiotics at this time. If family is agreeable, surgery can be offered to remove the exposed screw and bone and potentially reduce the concern for acute infectious flares.     Recommendations:  -no need for antibiotics at this time  -consider amputation   -abx can be used during the perioperative period as per podiatry protocol 83F PMH of CVA with right sided deficits RUE>RLE resulting in dementia with behavioral disturbance and dysphagia requiring PEG tube, Afib on Eliquis who presents for dislodged PEG tube.  Found to have an exposed screw from the 2nd toe.   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 Abx are only used to treat acute exacebations and for a short period of time. Given the chronicity, the stable vitals and labs there is no role for antibiotics at this time. If family is agreeable, surgery can be offered to remove the exposed screw and bone and potentially reduce the concern for acute infectious flares.     Recommendations:  -no need for antibiotics at this time  -risks of antibiotic use outweigh the benefits   -consider amputation   -abx can be used during the perioperative period as per podiatry protocol

## 2019-05-21 NOTE — PROGRESS NOTE ADULT - SUBJECTIVE AND OBJECTIVE BOX
Pre-Endoscopy Evaluation      Referring Physician: dr. sohail holm                                 Procedure:  upper gastrointestinal endoscopy/peg replacement    Indication for Procedure: dislodged peg tube    Pertinent History: 83y female with PMH of A-fib on Eliquis, CVA with R side contraction, nonverbal at baseline presenting with dislodged peg tube    Sedation by Anesthesia [x]    PAST MEDICAL & SURGICAL HISTORY:  PEG tube malfunction  A-fib  CVA (cerebral vascular accident)  No significant past surgical history      PMH of Gastroparesis [ ]  Gastric Surgery [ ]  Gastric Outlet Obstruction [ ]    Allergies:    No Known Allergies    Intolerances:    Latex allergy: [ ] yes [x] no    Medications:MEDICATIONS  (STANDING):  dextrose 5% + sodium chloride 0.45%. 1000 milliLiter(s) (80 mL/Hr) IV Continuous <Continuous>  digoxin  Injectable 0.1 milliGRAM(s) IV Push daily  valproate sodium IVPB 250 milliGRAM(s) IV Intermittent daily    MEDICATIONS  (PRN):      Smoking: [ ] yes  [x] no    AICD/PPM: [ ] yes   [x] no    Pertinent lab data:                        14.4   6.74  )-----------( 186      ( 21 May 2019 08:54 )             41.3     05-21    134<L>  |  101  |  7   ----------------------------<  136<H>  3.8   |  23  |  0.41<L>    Ca    8.8      21 May 2019 07:04  Mg     2.0     05-20    TPro  7.1  /  Alb  4.0  /  TBili  0.8  /  DBili  x   /  AST  64<H>  /  ALT  45  /  AlkPhos  58  05-19    PT/INR - ( 21 May 2019 09:16 )   PT: 11.8 sec;   INR: 1.04 ratio      PTT - ( 19 May 2019 11:49 )  PTT:34.5 sec        Physical Examination:    Daily   Vital Signs Last 24 Hrs  T(C): 36.7 (21 May 2019 08:25), Max: 36.9 (20 May 2019 16:01)  T(F): 98 (21 May 2019 08:25), Max: 98.4 (20 May 2019 16:01)  HR: 68 (21 May 2019 09:50) (66 - 73)  BP: 144/83 (21 May 2019 09:50) (121/71 - 154/79)  BP(mean): --  RR: 18 (21 May 2019 08:25) (18 - 18)  SpO2: 98% (21 May 2019 09:50) (93% - 98%)    Drug Dosing Weight  Height (cm): 152.4 (19 May 2019 18:22)  Weight (kg): 38.9 (19 May 2019 18:22)  BMI (kg/m2): 16.7 (19 May 2019 18:22)  BSA (m2): 1.3 (19 May 2019 18:22)    Constitutional: NAD     Neck:  No JVD    Respiratory: CTAB/L    Cardiovascular: S1 and S2    Gastrointestinal: BS+, soft, NT/ND    Extremities: No peripheral edema    Neurological: Awake, alert, aphasic    : No Pizano    Skin: No rashes    Comments:    ASA Class: I [ ]  II [ ]  III [x]  IV [ ]    The patient is a suitable candidate for the planned procedure unless box checked [ ]  No, explain:

## 2019-05-21 NOTE — DIETITIAN INITIAL EVALUATION ADULT. - NS AS NUTRI INTERV ENTERAL NUTRITION
Schedule/Feeds to resume tomorrow.  Recommend Jevity 1.2 bolus feeds 237 ml Q6hrs to provide total formula 948ml, free water 782ml,  1137calories, 29/kg, protein 53gm, 1.35gm/kg, Added Free water  flush per team, currently 100ml Q6hrs,/Volume/Composition/Rate

## 2019-05-21 NOTE — DIETITIAN INITIAL EVALUATION ADULT. - ENERGY NEEDS
82 y/o F pmhx CVA with R side contraction at baseline, nonverbal, presenting with son and granddaughter for clogged PEG tube. Patient was here x1mo ago with same issue, had 20F at that time which was replaced with a 14F. Family stating patient has no close follow-up with specialist who follows her tube, and has come to the ED twice since the time it was placed x1.5 years ago for replacements, has not had any follow-up with doctor who placed it at [facility]. Reports last night when trying to administer medications and food through tube unable to pass tube. State patient does not take anything by mouth  Peg malfunction

## 2019-05-21 NOTE — PROGRESS NOTE ADULT - SUBJECTIVE AND OBJECTIVE BOX
Patient is a 83y old  Female who presents with a chief complaint of PEG tube displaced (21 May 2019 10:55)      SUBJECTIVE / OVERNIGHT EVENTS: overnight events noted    ROS:  Resp: No cough no sputum production  CVS: No chest pain no palpitations no orthopnea  GI: no N/V/D  : no dysuria, no hematuria  Neuro: no weakness no paresthesias  Heme: No petechiae no easy bruising  Msk: No joint pain no swelling  Skin: No rash no itching        MEDICATIONS  (STANDING):  dextrose 5% + sodium chloride 0.45%. 1000 milliLiter(s) (80 mL/Hr) IV Continuous <Continuous>  digoxin  Injectable 0.1 milliGRAM(s) IV Push daily  valproate sodium IVPB 250 milliGRAM(s) IV Intermittent daily    MEDICATIONS  (PRN):        CAPILLARY BLOOD GLUCOSE        I&O's Summary    20 May 2019 07:01  -  21 May 2019 07:00  --------------------------------------------------------  IN: 1520 mL / OUT: 0 mL / NET: 1520 mL    21 May 2019 07:01  -  21 May 2019 11:31  --------------------------------------------------------  IN: 240 mL / OUT: 0 mL / NET: 240 mL        Vital Signs Last 24 Hrs  T(C): 36.7 (21 May 2019 08:25), Max: 36.9 (20 May 2019 16:01)  T(F): 98 (21 May 2019 08:25), Max: 98.4 (20 May 2019 16:01)  HR: 68 (21 May 2019 09:50) (66 - 73)  BP: 144/83 (21 May 2019 09:50) (121/71 - 154/79)  BP(mean): --  RR: 18 (21 May 2019 08:25) (18 - 18)  SpO2: 98% (21 May 2019 09:50) (93% - 98%)    PHYSICAL EXAM:  GENERAL: NAD, asthenic  HEAD:  Atraumatic, Normocephalic  EYES: EOMI, PERRLA, conjunctiva and sclera clear  NECK: Supple, No JVD  CHEST/LUNG: no rhonchi, no wheeze, clear to auscultation bilaterally  HEART: S1 S2; soft ejection systolic murmur best heard at left sternal border No rubs or gallops  ABDOMEN: Soft, Nontender, Nondistended; Bowel sounds present. PEG site clean  EXTREMITIES:  No clubbing or cyanosis, + Peripheral Pulses,  no edema  right hallux with visible screw in place, no drainage  PSYCH: confused  NEUROLOGY: contracted RUE  patient not cooperative  SKIN: No rashes or lesions    LABS:                        14.4   6.74  )-----------( 186      ( 21 May 2019 08:54 )             41.3     05-21    134<L>  |  101  |  7   ----------------------------<  136<H>  3.8   |  23  |  0.41<L>    Ca    8.8      21 May 2019 07:04  Mg     2.0     05-20    TPro  7.1  /  Alb  4.0  /  TBili  0.8  /  DBili  x   /  AST  64<H>  /  ALT  45  /  AlkPhos  58  05-19    PT/INR - ( 21 May 2019 09:16 )   PT: 11.8 sec;   INR: 1.04 ratio         PTT - ( 19 May 2019 11:49 )  PTT:34.5 sec            All consultant(s) notes reviewed and care discussed with other providers        Contact Number, Dr Sharma [phone]

## 2019-05-21 NOTE — DIETITIAN INITIAL EVALUATION ADULT. - OTHER INFO
Consult request by NP for PEG feeding recommendation. Patient admitted with malfunctioning PEG, now repaired with plans to initiate feeding tomorrow. Per NP no family present to obtain current home tube feeding regimen. Review of previous admissions shows patient had been receiving Jevity 1.2  237ml 4x daily. Patient is nonverbal, now with mittens for behavioral issues (pulling at tubes)

## 2019-05-21 NOTE — CHART NOTE - NSCHARTNOTEFT_GEN_A_CORE
Upon Nutritional Assessment by the Registered Dietitian your patient was determined to meet criteria / has evidence of the following diagnosis/diagnoses:          [ ]  Mild Protein Calorie Malnutrition        [ ]  Moderate Protein Calorie Malnutrition        [ X] Severe Protein Calorie Malnutrition        [ ] Unspecified Protein Calorie Malnutrition        [ x] Underweight / BMI <19        [ ] Morbid Obesity / BMI > 40      Findings as based on:  [X ] Comprehensive nutrition assessment weight loss 15% in 1 year  [X ] Nutrition Focused Physical Exam; visible muscle loss, unable to perform NFPE due to patient contracted  [ X] Other: BMI=16      Nutrition Plan/Recommendations:  Jevity 1.2 bolus feeds 237 ml Q6hrs to provide total formula 948ml, free water 782ml,  1137calories, 29/kg, protein 53gm, 1.35gm/kg, Added Free water  flush per team, currently 100ml Q6hrs     added free water per team  Monitor tube feed tolerance, weight, skin, labs.     PROVIDER Section:     By signing this assessment you are acknowledging and agree with the diagnosis/diagnoses assigned by the Registered Dietitian    Comments:

## 2019-05-21 NOTE — CONSULT NOTE ADULT - ATTENDING COMMENTS
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
Agree with above. Plan for endoscopic repeat PEG placement as tract now closed. Will discuss with family regarding risks especially given multiple dislodgements, including bleeding, infection, perforation, peritonitis that can result in death.

## 2019-05-22 DIAGNOSIS — M86.9 OSTEOMYELITIS, UNSPECIFIED: ICD-10-CM

## 2019-05-22 PROCEDURE — 99232 SBSQ HOSP IP/OBS MODERATE 35: CPT

## 2019-05-22 PROCEDURE — 99232 SBSQ HOSP IP/OBS MODERATE 35: CPT | Mod: GC

## 2019-05-22 RX ORDER — GABAPENTIN 400 MG/1
100 CAPSULE ORAL THREE TIMES A DAY
Refills: 0 | Status: DISCONTINUED | OUTPATIENT
Start: 2019-05-22 | End: 2019-05-24

## 2019-05-22 RX ORDER — VALPROIC ACID (AS SODIUM SALT) 250 MG/5ML
250 SOLUTION, ORAL ORAL DAILY
Refills: 0 | Status: DISCONTINUED | OUTPATIENT
Start: 2019-05-22 | End: 2019-05-22

## 2019-05-22 RX ORDER — TRAZODONE HCL 50 MG
50 TABLET ORAL AT BEDTIME
Refills: 0 | Status: DISCONTINUED | OUTPATIENT
Start: 2019-05-22 | End: 2019-05-24

## 2019-05-22 RX ORDER — VALPROIC ACID (AS SODIUM SALT) 250 MG/5ML
250 SOLUTION, ORAL ORAL DAILY
Refills: 0 | Status: DISCONTINUED | OUTPATIENT
Start: 2019-05-22 | End: 2019-05-24

## 2019-05-22 RX ORDER — SIMVASTATIN 20 MG/1
40 TABLET, FILM COATED ORAL AT BEDTIME
Refills: 0 | Status: DISCONTINUED | OUTPATIENT
Start: 2019-05-22 | End: 2019-05-24

## 2019-05-22 RX ADMIN — Medication 1 MILLIGRAM(S): at 21:09

## 2019-05-22 RX ADMIN — SODIUM CHLORIDE 80 MILLILITER(S): 9 INJECTION, SOLUTION INTRAVENOUS at 12:25

## 2019-05-22 RX ADMIN — GABAPENTIN 100 MILLIGRAM(S): 400 CAPSULE ORAL at 21:09

## 2019-05-22 RX ADMIN — SIMVASTATIN 40 MILLIGRAM(S): 20 TABLET, FILM COATED ORAL at 21:09

## 2019-05-22 RX ADMIN — Medication 250 MILLIGRAM(S): at 14:38

## 2019-05-22 RX ADMIN — Medication 50 MILLIGRAM(S): at 21:09

## 2019-05-22 RX ADMIN — GABAPENTIN 100 MILLIGRAM(S): 400 CAPSULE ORAL at 14:38

## 2019-05-22 NOTE — PROGRESS NOTE ADULT - SUBJECTIVE AND OBJECTIVE BOX
CLARISA DAWN 83y [id number]    Patient is a 83y old  Female who presents with a chief complaint of PEG tube displaced (22 May 2019 07:42)      Follow Up/CC:  ID following for toe OM    Interval History/ROS: no fever    Allergies    No Known Allergies    Intolerances        ANTIMICROBIALS:      MEDICATIONS  (STANDING):  dextrose 5% + sodium chloride 0.45%. 1000 milliLiter(s) (80 mL/Hr) IV Continuous <Continuous>  digoxin  Injectable 0.1 milliGRAM(s) IV Push daily  valproate sodium IVPB 250 milliGRAM(s) IV Intermittent daily    MEDICATIONS  (PRN):        Vital Signs Last 24 Hrs  T(C): 36.4 (22 May 2019 05:32), Max: 37.4 (21 May 2019 20:21)  T(F): 97.6 (22 May 2019 05:32), Max: 99.4 (21 May 2019 20:21)  HR: 74 (22 May 2019 05:32) (74 - 87)  BP: 127/74 (22 May 2019 05:32) (127/74 - 165/85)  BP(mean): --  RR: 18 (22 May 2019 05:32) (18 - 18)  SpO2: 95% (22 May 2019 05:32) (95% - 99%)    CBC Full  -  ( 21 May 2019 08:54 )  WBC Count : 6.74 K/uL  RBC Count : 4.35 M/uL  Hemoglobin : 14.4 g/dL  Hematocrit : 41.3 %  Platelet Count - Automated : 186 K/uL  Mean Cell Volume : 94.9 fl  Mean Cell Hemoglobin : 33.1 pg  Mean Cell Hemoglobin Concentration : 34.9 gm/dL  Auto Neutrophil # : x  Auto Lymphocyte # : x  Auto Monocyte # : x  Auto Eosinophil # : x  Auto Basophil # : x  Auto Neutrophil % : x  Auto Lymphocyte % : x  Auto Monocyte % : x  Auto Eosinophil % : x  Auto Basophil % : x    05-21    134<L>  |  101  |  7   ----------------------------<  136<H>  3.8   |  23  |  0.41<L>    Ca    8.8      21 May 2019 07:04            MICROBIOLOGY:          v            RADIOLOGY

## 2019-05-22 NOTE — PROGRESS NOTE ADULT - ASSESSMENT
82 y/o F pmhx CVA on eliquis with R side contraction at baseline, nonverbal, presenting with son and granddaughter for clogged PEG tube. GI now consulted for replacement.    1) Oropharyngeal dysphagia s/p PEG replacement (endoscopically) yesterday  2) CVA on Eliquis      Recs:  - Patient must not pull at PEG tube - if it is dislodged, it is a medical emergency and could lead to death from septic peritonitis as this new PEG needs 4-6 weeks to form a tract.  Patient should be on a 1:1 and restrained if needed.  Abdominal binder should remain in place for safety.  - ok to start feeds, water, and medications through PEG   - daily dry dressings, clean with soap and water  - bumper should be loosely touching skin at 2.5cm 84 y/o F pmhx CVA on eliquis with R side contraction at baseline, nonverbal, presenting with son and granddaughter for clogged PEG tube. GI now consulted for replacement.    1) Oropharyngeal dysphagia s/p PEG replacement (endoscopically) yesterday  2) CVA on Eliquis      Recs:  - Patient must not pull at PEG tube - if it is dislodged, it is a medical emergency and could lead to death from septic peritonitis as this new PEG needs 4-6 weeks to form a tract.  Patient should be on a 1:1 and restrained if needed.  Abdominal binder should remain in place for safety.  - ok to start feeds, water, and medications through PEG   - daily dry dressings, clean with soap and water  - bumper should be loosely touching skin at 3cm (currently at this level) 84 y/o F pmhx CVA on eliquis with R side contraction at baseline, nonverbal, presenting with son and granddaughter for clogged PEG tube. GI now consulted for replacement.    1) Oropharyngeal dysphagia s/p PEG replacement (endoscopically) yesterday  2) CVA on Eliquis      Recs:  - Patient must not pull at PEG tube - if it is dislodged, it is a medical emergency and could lead to death from septic peritonitis as this new PEG needs 4-6 weeks to form a tract.  Patient should be on a 1:1 and restrained if needed.  Abdominal binder should remain in place for safety.  - ok to start feeds, water, and medications through PEG   - daily dry dressings, clean with soap and water  - bumper should be loosely touching skin at 3cm (currently at this level)    GI will sign off, please call back with any further questions. 82 y/o F pmhx CVA on eliquis with R side contraction at baseline, nonverbal, presenting with son and granddaughter for clogged PEG tube. GI now consulted for replacement.    1) Oropharyngeal dysphagia s/p PEG replacement (endoscopically) yesterday  2) CVA on Eliquis      Recs:  - Patient must not pull at PEG tube - if it is dislodged, it is a medical emergency and could lead to death from septic peritonitis as this new PEG needs 4-6 weeks to form a tract.  Patient should be on a 1:1 and restrained if needed.  Abdominal binder should remain in place for safety. If PEG is dislodged, notify GI team immediately.  - ok to start feeds, water, and medications through PEG   - daily dry dressings, clean with soap and water  - bumper should be loosely touching skin at 3cm (currently at this level)    GI will sign off, please call back with any further questions.

## 2019-05-22 NOTE — PROGRESS NOTE ADULT - SUBJECTIVE AND OBJECTIVE BOX
Patient is a 83y old  Female who presents with a chief complaint of PEG tube displaced (22 May 2019 11:11)      SUBJECTIVE / OVERNIGHT EVENTS: overnight events noted    ROS:  Resp: No cough no sputum production  CVS: No chest pain no palpitations no orthopnea  GI: no N/V/D  : no dysuria, no hematuria  Neuro: no weakness no paresthesias  Heme: No petechiae no easy bruising  Msk: No joint pain no swelling  Skin: No rash no itching        MEDICATIONS  (STANDING):  gabapentin   Solution 100 milliGRAM(s) Oral three times a day  LORazepam     Tablet 1 milliGRAM(s) Oral at bedtime  simvastatin 40 milliGRAM(s) Oral at bedtime  traZODone 50 milliGRAM(s) Oral at bedtime  valproic  acid Syrup 250 milliGRAM(s) Oral daily    MEDICATIONS  (PRN):        CAPILLARY BLOOD GLUCOSE        I&O's Summary    21 May 2019 07:01  -  22 May 2019 07:00  --------------------------------------------------------  IN: 1080 mL / OUT: 0 mL / NET: 1080 mL        Vital Signs Last 24 Hrs  T(C): 36.3 (22 May 2019 12:11), Max: 37.4 (21 May 2019 20:21)  T(F): 97.4 (22 May 2019 12:11), Max: 99.4 (21 May 2019 20:21)  HR: 69 (22 May 2019 12:11) (69 - 87)  BP: 133/80 (22 May 2019 12:11) (127/74 - 152/84)  BP(mean): --  RR: 18 (22 May 2019 12:11) (18 - 18)  SpO2: 96% (22 May 2019 12:11) (95% - 99%)    GENERAL: NAD, asthenic  HEAD:  Atraumatic, Normocephalic  EYES: EOMI, PERRLA, conjunctiva and sclera clear  NECK: Supple, No JVD  CHEST/LUNG: no rhonchi, no wheeze, clear to auscultation bilaterally  HEART: S1 S2; soft ejection systolic murmur best heard at left sternal border No rubs or gallops  ABDOMEN: Soft, Nontender, Nondistended; Bowel sounds present. PEG re-inserted, site clean  EXTREMITIES:  No clubbing or cyanosis, + Peripheral Pulses,  no edema  right hallux with visible screw in place, no drainage  PSYCH: confused  NEUROLOGY: contracted RUE  patient not cooperative  SKIN: No rashes or lesions    LABS:                        14.4   6.74  )-----------( 186      ( 21 May 2019 08:54 )             41.3     05-21    134<L>  |  101  |  7   ----------------------------<  136<H>  3.8   |  23  |  0.41<L>    Ca    8.8      21 May 2019 07:04      PT/INR - ( 21 May 2019 09:16 )   PT: 11.8 sec;   INR: 1.04 ratio                     All consultant(s) notes reviewed and care discussed with other providers        Contact Number, Dr Sharma [phone]

## 2019-05-22 NOTE — PROGRESS NOTE ADULT - ASSESSMENT
83F PMH of CVA with right sided deficits RUE>RLE resulting in dementia with behavioral disturbance and dysphagia requiring PEG tube, Afib on Eliquis who presents for dislodged PEG tube.  Found to have an exposed screw from the 2nd toe.   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 Abx are only used to treat acute exacebations and for a short period of time. Given the chronicity, the stable vitals and labs there is no role for antibiotics at this time. If family is agreeable, surgery can be offered to remove the exposed screw and bone and potentially reduce the concern for acute infectious flares.

## 2019-05-22 NOTE — PROGRESS NOTE ADULT - ASSESSMENT
82yo F w/ right foot exposed hardware 2nd toe no original DOS as pt is nonverbal    ·	On attempting removal of hardware, dorsal ulceration/eschar deroofed with +probe to bone/hardware.  Likely Chronic OM given the appearance of the toe, appearance of XR and probe to bone ulceration with screw exposed (length of time unknown?)  ·	After discussing options of surgery vs. conservative treatment, family opting for toe amputation   ·	Will obtain phone consent for procedure  ·	Please document medical clearance - local with sedation  ·	Booked for Friday, 730am  ·	dw Dr. Sharma  ·	Will follow

## 2019-05-22 NOTE — PROGRESS NOTE ADULT - SUBJECTIVE AND OBJECTIVE BOX
Chief Complaint:  Patient is a 83y old  Female who presents with a chief complaint of PEG tube displaced (21 May 2019 13:47)      Interval Events: Patient had PEG placed uneventfully yesterday, however, overnight there is documentation that patient attempting to pull out PEG tube and unsecured mitten was applied.     Allergies:  No Known Allergies      Hospital Medications:  dextrose 5% + sodium chloride 0.45%. 1000 milliLiter(s) IV Continuous <Continuous>  digoxin  Injectable 0.1 milliGRAM(s) IV Push daily  valproate sodium IVPB 250 milliGRAM(s) IV Intermittent daily      PMHX/PSHX:  PEG tube malfunction  CVA (cerebral vascular accident)  No significant past surgical history      Family history:  No pertinent family history in first degree relatives      ROS:     General:  No wt loss, fevers, chills, night sweats, fatigue,   Eyes:  Good vision, no reported pain  ENT:  No sore throat, pain, runny nose, dysphagia  CV:  No pain, palpitations, hypo/hypertension  Resp:  No dyspnea, cough, tachypnea, wheezing  GI:  See HPI  :  No pain, bleeding, incontinence, nocturia  Muscle:  No pain, weakness  Neuro:  No weakness, tingling, memory problems  Psych:  No fatigue, insomnia, mood problems, depression  Endocrine:  No polyuria, polydipsia, cold/heat intolerance  Heme:  No petechiae, ecchymosis, easy bruisability  Skin:  No rash, edema      PHYSICAL EXAM:     GENERAL: NAD  HEENT:  NC/AT  CHEST:  Full & symmetric excursion, no increased effort  ABDOMEN:  Soft, non-tender, non-distended, +BS, PEG site c/d/i secured with abdominal binder   EXTREMITIES:  no edema  SKIN:  No rash  NEURO:  Alert      Vital Signs:  Vital Signs Last 24 Hrs  T(C): 36.4 (22 May 2019 05:32), Max: 37.4 (21 May 2019 20:21)  T(F): 97.6 (22 May 2019 05:32), Max: 99.4 (21 May 2019 20:21)  HR: 74 (22 May 2019 05:32) (68 - 87)  BP: 127/74 (22 May 2019 05:32) (127/74 - 165/85)  BP(mean): --  RR: 18 (22 May 2019 05:32) (18 - 18)  SpO2: 95% (22 May 2019 05:32) (95% - 99%)  Daily     Daily Weight in k.9 (21 May 2019 15:59)    LABS:                        14.4   6.74  )-----------( 186      ( 21 May 2019 08:54 )             41.3     -    134<L>  |  101  |  7   ----------------------------<  136<H>  3.8   |  23  |  0.41<L>    Ca    8.8      21 May 2019 07:04        PT/INR - ( 21 May 2019 09:16 )   PT: 11.8 sec;   INR: 1.04 ratio                 Imaging:  < from: Upper Endoscopy (19 @ 11:08) >    [facility]  ____________________________________________________________________________________________________  Patient Name: Karina Garza                        MRN: [id number]  Account Number: [id number]                     YOB: 1935  Room: Endoscopy Room 2                           Gender: Female  Attending MD: Xavier Mckeon MD          Procedure Date No Time: 2019  ____________________________________________________________________________________________________     Procedure:           Upper GI endoscopy  Indications:         Dysphagia  Providers:           Xavier Mckeon MD, Christa Zee (Fellow), Zaira Acosta (Fellow)  Medicines:           Monitored Anesthesia Care, Ancef 2gm IV  Complications:       No immediate complications.  ____________________________________________________________________________________________________  Procedure:           Pre-Anesthesia Assessment:                       - Prior to the procedure, a History and Physical was performed, and patient                        medications and allergies were reviewed. The patient is unable to give                        consent secondary to the patient being legally incompetent to consent. The                   risks and benefits of the procedure and the sedation options and risks were                        discussed with the patient's child, including risks of bleeding, infection,                        perforation, peritonitis, PEG dislodgement that can result in death. All                        questions were answered and informed consent was obtained. Patient                        identification and proposed procedure were verified by the physician, the                        nurse, the anesthesiologist and the anesthetist in the endoscopy suite.                        Prophylactic Antibiotics: The patient requires prophylactic antibiotics for                        planned PEG placement. The patient received antibiotic therapy today, before                        the procedure started. Prior Anticoagulants: The patient has taken no                        previous anticoagulant or antiplatelet agents. ASA Grade Assessment: III - A                        patient with severesystemic disease. After reviewing the risks and benefits,                        the patient was deemed in satisfactory condition to undergo the procedure.                        The anesthesia plan was to use deep sedation / analgesia. Immediately prior                        to administration of medications, the patient was re-assessed for adequacy to                        receive sedatives. The heart rate, respiratory rate, oxygen saturations,                        blood pressure, adequacy of pulmonary ventilation, and response to care were                        monitored throughout the procedure. The physical status of the patient was                        re-assessed after the procedure.                       After obtaining informedconsent, the endoscope was passed under direct                        vision. Throughout the procedure, the patient's blood pressure, pulse, and                        oxygen saturations were monitored continuously. The Endoscope was introduced                    through the mouth, and advanced to the second part of duodenum. The upper GI                        endoscopy was accomplished without difficulty. The patient tolerated the                        procedure well.                                                                                   Findings:       The examined esophagus was normal.       The entire examined stomach was normal. A small scar from the previous PEG was seen. The        previous tract is completely closed, and even a small wire cannot be passed through the old        tract. The patient was placed in the supine position for PEG placement. The stomach was        insufflated to appose gastric and abdominal walls. A site was located in the body of the        stomach with excellent transillumination and manual external pressure for placement adjacent        but separate from the previous PEG site. The abdominal wall was marked and prepped in a        sterile manner. The area was anesthetized with 5 mL of 0.5% lidocaine. The trocar needle was        introduced through the abdominal wall and into the stomach under direct endoscopic view. A        snare was introduced through the endoscope and opened in the gastric lumen. The guide wire        was passed through the trocar and into the open snare. The snare was closed around the guide        wire. The endoscope and snare were removed, pulling the wire out through the mouth. A skin        incision was made at the site of needle insertion. The externally removable 20 Fr EndoVive        Safety gastrostomy tube was lubricated. The G-tube was tied to the guide wire and pulled        through the mouth and into the stomach. The trocar needle was removed, and the gastrostomy        tube was pulled out from the stomach through the skin. The external bumper was attached to        the gastrostomy tube, and the tube was cut to remove the guide wire. The final position of        the gastrostomy tube was confirmed by relook endoscopy,and skin marking noted to be 2.5 cm        at the external bumper. The final tension and compression of the abdominal wall by the PEG        tube and external bumper were checked and revealed that the bumper was loose and lightly        touching the skin. The feeding tube was capped, and the tube site cleaned and dressed.       The examined duodenum was normal.                                                                                                        Impression:          - Normal esophagus.                       - Normal stomach. Old PEG scar.                       - Normal examined duodenum.                       - An externally removable PEG placement was successfully completed.  Recommendation:      - [facility] for [facility] care.                       - Please follow the post-PEG recommendations including: antibiotic ointment                        to site today only, clean site with soap and water daily and dry thoroughly,                        dry dressing only.                       - NPO x4 hrs then water today and may use PEG tomorrow for feedings after GI                        fellow checks to confirm in place.                       - If PEG is dislodged within the next 4-6 weeks, immediately inform the GI                        team.                                                                                                        Attending Participation:       I was present and participated during the entire procedure, including non-key portions.                                                                                                          ________________________  Xavier Mckeon MD  2019 1:34:19 PM  Number of Addenda: 0    Note Initiated On: 2019 11:08 AM    < end of copied text > Chief Complaint:  Patient is a 83y old  Female who presents with a chief complaint of PEG tube displaced (21 May 2019 13:47)      Interval Events: Patient had PEG placed uneventfully yesterday, however, overnight there is documentation that patient attempting to pull out PEG tube and unsecured mitten was applied.     Allergies:  No Known Allergies      Hospital Medications:  dextrose 5% + sodium chloride 0.45%. 1000 milliLiter(s) IV Continuous <Continuous>  digoxin  Injectable 0.1 milliGRAM(s) IV Push daily  valproate sodium IVPB 250 milliGRAM(s) IV Intermittent daily      PMHX/PSHX:  PEG tube malfunction  CVA (cerebral vascular accident)  No significant past surgical history      Family history:  No pertinent family history in first degree relatives      ROS:     General:  No wt loss, fevers, chills, night sweats, fatigue,   Eyes:  Good vision, no reported pain  ENT:  No sore throat, pain, runny nose, dysphagia  CV:  No pain, palpitations, hypo/hypertension  Resp:  No dyspnea, cough, tachypnea, wheezing  GI:  See HPI  :  No pain, bleeding, incontinence, nocturia  Muscle:  No pain, weakness  Neuro:  No weakness, tingling, memory problems  Psych:  No fatigue, insomnia, mood problems, depression  Endocrine:  No polyuria, polydipsia, cold/heat intolerance  Heme:  No petechiae, ecchymosis, easy bruisability  Skin:  No rash, edema      PHYSICAL EXAM:     GENERAL: NAD  HEENT:  NC/AT  CHEST:  Full & symmetric excursion, no increased effort  ABDOMEN:  Soft, non-tender, non-distended, +BS, PEG site c/d/i secured with abdominal binder; PEG examined, freely mobile; loosend and now PEG at 3cm at the skin  EXTREMITIES:  no edema  SKIN:  No rash  NEURO:  Alert but not oriented; right sided hemiparesis      Vital Signs:  Vital Signs Last 24 Hrs  T(C): 36.4 (22 May 2019 05:32), Max: 37.4 (21 May 2019 20:21)  T(F): 97.6 (22 May 2019 05:32), Max: 99.4 (21 May 2019 20:21)  HR: 74 (22 May 2019 05:32) (68 - 87)  BP: 127/74 (22 May 2019 05:32) (127/74 - 165/85)  BP(mean): --  RR: 18 (22 May 2019 05:32) (18 - 18)  SpO2: 95% (22 May 2019 05:32) (95% - 99%)  Daily     Daily Weight in k.9 (21 May 2019 15:59)    LABS:                        14.4   6.74  )-----------( 186      ( 21 May 2019 08:54 )             41.3     05-    134<L>  |  101  |  7   ----------------------------<  136<H>  3.8   |  23  |  0.41<L>    Ca    8.8      21 May 2019 07:04        PT/INR - ( 21 May 2019 09:16 )   PT: 11.8 sec;   INR: 1.04 ratio                 Imaging:  < from: Upper Endoscopy (19 @ 11:08) >    [facility]  ____________________________________________________________________________________________________  Patient Name: Karina Garza                        MRN: [id number]  Account Number: [id number]                     YOB: 1935  Room: Endoscopy Room 2                           Gender: Female  Attending MD: Xavier Mckeon MD          Procedure Date No Time: 2019  ____________________________________________________________________________________________________     Procedure:           Upper GI endoscopy  Indications:         Dysphagia  Providers:           Xavier Mckeon MD, Christa Zee (Fellow), Zaira Acosta (Fellow)  Medicines:           Monitored Anesthesia Care, Ancef 2gm IV  Complications:       No immediate complications.  ____________________________________________________________________________________________________  Procedure:           Pre-Anesthesia Assessment:                       - Prior to the procedure, a History and Physical was performed, and patient                        medications and allergies were reviewed. The patient is unable to give                        consent secondary to the patient being legally incompetent to consent. The                   risks and benefits of the procedure and the sedation options and risks were                        discussed with the patient's child, including risks of bleeding, infection,                        perforation, peritonitis, PEG dislodgement that can result in death. All                        questions were answered and informed consent was obtained. Patient                        identification and proposed procedure were verified by the physician, the                        nurse, the anesthesiologist and the anesthetist in the endoscopy suite.                        Prophylactic Antibiotics: The patient requires prophylactic antibiotics for                        planned PEG placement. The patient received antibiotic therapy today, before                        the procedure started. Prior Anticoagulants: The patient has taken no                        previous anticoagulant or antiplatelet agents. ASA Grade Assessment: III - A                        patient with severesystemic disease. After reviewing the risks and benefits,                        the patient was deemed in satisfactory condition to undergo the procedure.                        The anesthesia plan was to use deep sedation / analgesia. Immediately prior                        to administration of medications, the patient was re-assessed for adequacy to                        receive sedatives. The heart rate, respiratory rate, oxygen saturations,                        blood pressure, adequacy of pulmonary ventilation, and response to care were                        monitored throughout the procedure. The physical status of the patient was                        re-assessed after the procedure.                       After obtaining informedconsent, the endoscope was passed under direct                        vision. Throughout the procedure, the patient's blood pressure, pulse, and                        oxygen saturations were monitored continuously. The Endoscope was introduced                    through the mouth, and advanced to the second part of duodenum. The upper GI                        endoscopy was accomplished without difficulty. The patient tolerated the                        procedure well.                                                                                   Findings:       The examined esophagus was normal.       The entire examined stomach was normal. A small scar from the previous PEG was seen. The        previous tract is completely closed, and even a small wire cannot be passed through the old        tract. The patient was placed in the supine position for PEG placement. The stomach was        insufflated to appose gastric and abdominal walls. A site was located in the body of the        stomach with excellent transillumination and manual external pressure for placement adjacent        but separate from the previous PEG site. The abdominal wall was marked and prepped in a        sterile manner. The area was anesthetized with 5 mL of 0.5% lidocaine. The trocar needle was        introduced through the abdominal wall and into the stomach under direct endoscopic view. A        snare was introduced through the endoscope and opened in the gastric lumen. The guide wire        was passed through the trocar and into the open snare. The snare was closed around the guide        wire. The endoscope and snare were removed, pulling the wire out through the mouth. A skin        incision was made at the site of needle insertion. The externally removable 20 Fr EndoVive        Safety gastrostomy tube was lubricated. The G-tube was tied to the guide wire and pulled        through the mouth and into the stomach. The trocar needle was removed, and the gastrostomy        tube was pulled out from the stomach through the skin. The external bumper was attached to        the gastrostomy tube, and the tube was cut to remove the guide wire. The final position of        the gastrostomy tube was confirmed by relook endoscopy,and skin marking noted to be 2.5 cm        at the external bumper. The final tension and compression of the abdominal wall by the PEG        tube and external bumper were checked and revealed that the bumper was loose and lightly        touching the skin. The feeding tube was capped, and the tube site cleaned and dressed.       The examined duodenum was normal.                                                                                                        Impression:          - Normal esophagus.                       - Normal stomach. Old PEG scar.                       - Normal examined duodenum.                       - An externally removable PEG placement was successfully completed.  Recommendation:      - [facility] for ongoing care.                       - Please follow the post-PEG recommendations including: antibiotic ointment                        to site today only, clean site with soap and water daily and dry thoroughly,                        dry dressing only.                       - NPO x4 hrs then water today and may use PEG tomorrow for feedings after GI                        fellow checks to confirm in place.                       - If PEG is dislodged within the next 4-6 weeks, immediately inform the GI                        team.                                                                                                        Attending Participation:       I was present and participated during the entire procedure, including non-key portions.                                                                                                          ________________________  Xavier Mckeon MD  2019 1:34:19 PM  Number of Addenda: 0    Note Initiated On: 2019 11:08 AM    < end of copied text >

## 2019-05-23 ENCOUNTER — TRANSCRIPTION ENCOUNTER (OUTPATIENT)
Age: 84
End: 2019-05-23

## 2019-05-23 LAB — DIGOXIN SERPL-MCNC: <0.4 NG/ML — LOW (ref 0.8–2)

## 2019-05-23 RX ORDER — SODIUM CHLORIDE 9 MG/ML
1000 INJECTION INTRAMUSCULAR; INTRAVENOUS; SUBCUTANEOUS
Refills: 0 | Status: DISCONTINUED | OUTPATIENT
Start: 2019-05-23 | End: 2019-05-24

## 2019-05-23 RX ORDER — DIGOXIN 250 MCG
0.12 TABLET ORAL DAILY
Refills: 0 | Status: DISCONTINUED | OUTPATIENT
Start: 2019-05-23 | End: 2019-05-24

## 2019-05-23 RX ADMIN — Medication 50 MILLIGRAM(S): at 22:57

## 2019-05-23 RX ADMIN — Medication 1 MILLIGRAM(S): at 22:58

## 2019-05-23 RX ADMIN — Medication 250 MILLIGRAM(S): at 12:40

## 2019-05-23 RX ADMIN — GABAPENTIN 100 MILLIGRAM(S): 400 CAPSULE ORAL at 05:46

## 2019-05-23 RX ADMIN — GABAPENTIN 100 MILLIGRAM(S): 400 CAPSULE ORAL at 14:10

## 2019-05-23 RX ADMIN — SIMVASTATIN 40 MILLIGRAM(S): 20 TABLET, FILM COATED ORAL at 22:58

## 2019-05-23 RX ADMIN — GABAPENTIN 100 MILLIGRAM(S): 400 CAPSULE ORAL at 22:59

## 2019-05-23 RX ADMIN — Medication 0.12 MILLIGRAM(S): at 12:40

## 2019-05-23 NOTE — DISCHARGE NOTE PROVIDER - CARE PROVIDER_API CALL
Venkatesh Mckeon)  Gastroenterology; Internal Medicine  [address] [place] [address]  Phone: [phone]  Fax: [phone]  Follow Up Time:     Sharon Inman (DPM)  Surgery  [facility], [address] [place] [address]  Phone: [phone]  Fax: [phone]  Follow Up Time:

## 2019-05-23 NOTE — CHART NOTE - NSCHARTNOTEFT_GEN_A_CORE
Pt to OR tomorrow 7:30AM   Preop orders placed  Consent previously obtained with pt's son on phone  d/w attending

## 2019-05-23 NOTE — DISCHARGE NOTE PROVIDER - NSDCFUADDINST_GEN_ALL_CORE_FT
Podiatry Discharge Instructions:  Follow up: Please follow up with Dr. Casas within 1 week of discharge from the hospital, please call [phone] ([place]) or [phone] ([place]) for appointment and discuss that you recently were seen in the hospital.  Wound Care: Please leave your dressing clean dry intact until your follow up appointment.  Weight bearing: Please weight bear as tolerated in a surgical shoe to the right foot, with weight distributed to the heel. activity as tolerated; use surgical  shoe on right foot when out of bed if ambulate;  fall precaution  Podiatry Discharge Instructions:  Follow up: Please follow up with Dr. Casas within 1 week of discharge from the hospital, please call [phone] ([employer]) or [phone] ([place]) for appointment and discuss that you recently were seen in the hospital.  Wound Care: Please leave your dressing clean dry intact until your follow up appointment.  Weight bearing: Please weight bear as tolerated in a surgical shoe to the right foot, with weight distributed to the heel.

## 2019-05-23 NOTE — DISCHARGE NOTE PROVIDER - INSTRUCTIONS
peg tube feeds with jevity 1.2 marvin 237 ml every 6 hourly; water flush 100ml every 6 hours  peg site care-clean the area with soap and water-keep it clean and dry; place abdominal binder to prevent pt pulling the tube; careful when handling the tube to prevent dislodging the tube peg tube feeds with osmolyte ( pt home tube feeds)237 ml every 6 hourly; water flush 100ml every 6 hours  peg site care-clean the area with soap and water-keep it clean and dry; place abdominal binder to prevent pt pulling the tube; careful when handling the tube to prevent dislodging the tube peg tube feeds with osmolyte 1.2 marvin ( pt home tube feeds)237 ml every 6 hourly; water flush 100ml every 6 hours  peg site care-clean the area with soap and water-keep it clean and dry; place abdominal binder to prevent pt pulling the tube; careful when handling the tube to prevent dislodging the tube

## 2019-05-23 NOTE — PROGRESS NOTE ADULT - SUBJECTIVE AND OBJECTIVE BOX
Patient is a 83y old  Female who presents with a chief complaint of PEG tube displaced (22 May 2019 15:59)    SUBJECTIVE / OVERNIGHT EVENTS: overnight events noted    ROS:  Resp: No cough no sputum production  CVS: No chest pain no palpitations no orthopnea  GI: no N/V/D  : no dysuria, no hematuria  Neuro: no weakness no paresthesias  Heme: No petechiae no easy bruising  Msk: No joint pain no swelling  Skin: No rash no itching        MEDICATIONS  (STANDING):  digoxin     Tablet 0.125 milliGRAM(s) Oral daily  gabapentin   Solution 100 milliGRAM(s) Oral three times a day  LORazepam     Tablet 1 milliGRAM(s) Oral at bedtime  simvastatin 40 milliGRAM(s) Oral at bedtime  sodium chloride 0.9%. 1000 milliLiter(s) (50 mL/Hr) IV Continuous <Continuous>  traZODone 50 milliGRAM(s) Oral at bedtime  valproic  acid Syrup 250 milliGRAM(s) Oral daily    MEDICATIONS  (PRN):        CAPILLARY BLOOD GLUCOSE        I&O's Summary    22 May 2019 07:01  -  23 May 2019 07:00  --------------------------------------------------------  IN: 1554 mL / OUT: 0 mL / NET: 1554 mL        Vital Signs Last 24 Hrs  T(C): 36.3 (23 May 2019 10:11), Max: 36.4 (22 May 2019 21:04)  T(F): 97.3 (23 May 2019 10:11), Max: 97.5 (22 May 2019 21:04)  HR: 69 (23 May 2019 12:36) (69 - 87)  BP: 121/65 (23 May 2019 10:11) (112/68 - 147/84)  BP(mean): --  RR: 18 (23 May 2019 10:11) (18 - 18)  SpO2: 99% (23 May 2019 10:11) (96% - 99%)    PHYSICAL EXAM:  GENERAL: NAD, asthenic  HEAD:  Atraumatic, Normocephalic  EYES: EOMI, PERRLA, conjunctiva and sclera clear  NECK: Supple, No JVD  CHEST/LUNG: no rhonchi, no wheeze, clear to auscultation bilaterally  HEART: S1 S2; soft ejection systolic murmur best heard at left sternal border No rubs or gallops  ABDOMEN: Soft, Nontender, Nondistended; Bowel sounds present. PEG site clean  EXTREMITIES:  No clubbing or cyanosis, + Peripheral Pulses,  no edema  right 2nd toe with visible screw in place, no drainage  PSYCH: confused  NEUROLOGY: contracted RUE  patient not cooperative  SKIN: No rashes or lesions    LABS:                      All consultant(s) notes reviewed and care discussed with other providers        Contact Number, Dr Sharma [phone]

## 2019-05-23 NOTE — DISCHARGE NOTE PROVIDER - NSDCCPCAREPLAN_GEN_ALL_CORE_FT
PRINCIPAL DISCHARGE DIAGNOSIS  Diagnosis: PEG tube malfunction  Assessment and Plan of Treatment: peg tube replaced on 5/23/19 by GI team  keep the site clean and dry-wash with soap and water and keep it dry;  abdominal binder to secure and pt to prevent pulling out the tube      SECONDARY DISCHARGE DIAGNOSES  Diagnosis: Toe osteomyelitis  Assessment and Plan of Treatment: Toe osteomyelitis  amputation of right 2nd toe by podiatry    Diagnosis: Afib  Assessment and Plan of Treatment: Afib  continue medication as prescribed  monitor for any bleeding PRINCIPAL DISCHARGE DIAGNOSIS  Diagnosis: PEG tube malfunction  Assessment and Plan of Treatment: peg tube replaced on 5/23/19 by GI team  keep the site clean and dry-wash with soap and water and keep it dry;  abdominal binder to secure and pt to prevent pulling out the tube      SECONDARY DISCHARGE DIAGNOSES  Diagnosis: Toe osteomyelitis  Assessment and Plan of Treatment: Toe osteomyelitis  amputation of right 2nd toe by podiatry on 5/24/19; keep dressing clean and dry  follow up with podiatrist in 1 week    Diagnosis: Afib  Assessment and Plan of Treatment: Afib  continue medication as prescribed  monitor for any bleeding

## 2019-05-23 NOTE — DISCHARGE NOTE PROVIDER - HOSPITAL COURSE
83F PMH of CVA with right sided deficits RUE>RLE resulting in dementia with behavioral disturbance and dysphagia requiring PEG tube, Afib on Eliquis who presents for dislodged PEG tube.    PEG tube malfunction.   replaced peg tube by GI team on 5/21/19         right foot exposed hardware 2nd toe - seen by podiatry for toe amputation 83F PMH of CVA with right sided deficits RUE>RLE resulting in dementia with behavioral disturbance and dysphagia requiring PEG tube, Afib on Eliquis who presents for dislodged PEG tube.    PEG tube malfunction.   replaced peg tube by GI team on 5/21/19         right foot exposed hardware 2nd toe - seen by podiatry had 2nd toe amputation on 5/24/19-follow up with podiatry in 1week fro follow up 83F PMH of CVA with right sided deficits RUE>RLE resulting in dementia with behavioral disturbance and dysphagia requiring PEG tube, Afib on Eliquis who presents for dislodged PEG tube.    PEG tube malfunction.   replaced peg tube by GI team on 5/21/19         right foot exposed hardware 2nd toe - seen by podiatry had 2nd toe amputation on 5/24/19- keep amputation site clean and dry; follow up with podiatry in 1week for follow up 83F PMH of CVA with right sided deficits RUE>RLE resulting in dementia with behavioral disturbance and dysphagia requiring PEG tube, Afib on Eliquis who presents for dislodged PEG tube.    PEG tube malfunction.   replaced peg tube by GI team on 5/21/19         right foot exposed hardware 2nd toe - seen by podiatry had 2nd toe amputation on 5/24/19- keep amputation site clean and dry; follow up with podiatry in 1week for follow up    cleared by MD churchill discharge home.

## 2019-05-24 ENCOUNTER — RESULT REVIEW (OUTPATIENT)
Age: 84
End: 2019-05-24

## 2019-05-24 LAB
ANION GAP SERPL CALC-SCNC: 10 MMOL/L — SIGNIFICANT CHANGE UP (ref 5–17)
APTT BLD: 31.1 SEC — SIGNIFICANT CHANGE UP (ref 27.5–36.3)
BLD GP AB SCN SERPL QL: NEGATIVE — SIGNIFICANT CHANGE UP
BUN SERPL-MCNC: 10 MG/DL — SIGNIFICANT CHANGE UP (ref 7–23)
CALCIUM SERPL-MCNC: 8.9 MG/DL — SIGNIFICANT CHANGE UP (ref 8.4–10.5)
CHLORIDE SERPL-SCNC: 104 MMOL/L — SIGNIFICANT CHANGE UP (ref 96–108)
CO2 SERPL-SCNC: 28 MMOL/L — SIGNIFICANT CHANGE UP (ref 22–31)
CREAT SERPL-MCNC: 0.53 MG/DL — SIGNIFICANT CHANGE UP (ref 0.5–1.3)
GLUCOSE SERPL-MCNC: 148 MG/DL — HIGH (ref 70–99)
GRAM STN FLD: SIGNIFICANT CHANGE UP
HBA1C BLD-MCNC: 5.5 % — SIGNIFICANT CHANGE UP (ref 4–5.6)
HCT VFR BLD CALC: 42.9 % — SIGNIFICANT CHANGE UP (ref 34.5–45)
HGB BLD-MCNC: 14.3 G/DL — SIGNIFICANT CHANGE UP (ref 11.5–15.5)
INR BLD: 0.97 RATIO — SIGNIFICANT CHANGE UP (ref 0.88–1.16)
MCHC RBC-ENTMCNC: 33.3 GM/DL — SIGNIFICANT CHANGE UP (ref 32–36)
MCHC RBC-ENTMCNC: 33.6 PG — SIGNIFICANT CHANGE UP (ref 27–34)
MCV RBC AUTO: 100.7 FL — HIGH (ref 80–100)
PLATELET # BLD AUTO: 214 K/UL — SIGNIFICANT CHANGE UP (ref 150–400)
POTASSIUM SERPL-MCNC: 4.1 MMOL/L — SIGNIFICANT CHANGE UP (ref 3.5–5.3)
POTASSIUM SERPL-SCNC: 4.1 MMOL/L — SIGNIFICANT CHANGE UP (ref 3.5–5.3)
PROTHROM AB SERPL-ACNC: 11.2 SEC — SIGNIFICANT CHANGE UP (ref 10–13.1)
RBC # BLD: 4.26 M/UL — SIGNIFICANT CHANGE UP (ref 3.8–5.2)
RBC # FLD: 12.2 % — SIGNIFICANT CHANGE UP (ref 10.3–14.5)
RH IG SCN BLD-IMP: POSITIVE — SIGNIFICANT CHANGE UP
SODIUM SERPL-SCNC: 142 MMOL/L — SIGNIFICANT CHANGE UP (ref 135–145)
SPECIMEN SOURCE: SIGNIFICANT CHANGE UP
WBC # BLD: 5.27 K/UL — SIGNIFICANT CHANGE UP (ref 3.8–10.5)
WBC # FLD AUTO: 5.27 K/UL — SIGNIFICANT CHANGE UP (ref 3.8–10.5)

## 2019-05-24 PROCEDURE — 88311 DECALCIFY TISSUE: CPT | Mod: 26

## 2019-05-24 PROCEDURE — 88305 TISSUE EXAM BY PATHOLOGIST: CPT | Mod: 26

## 2019-05-24 PROCEDURE — 73630 X-RAY EXAM OF FOOT: CPT | Mod: 26,RT

## 2019-05-24 RX ORDER — TRAZODONE HCL 50 MG
50 TABLET ORAL AT BEDTIME
Refills: 0 | Status: DISCONTINUED | OUTPATIENT
Start: 2019-05-24 | End: 2019-05-28

## 2019-05-24 RX ORDER — ACETAMINOPHEN 500 MG
2 TABLET ORAL
Qty: 0 | Refills: 0 | DISCHARGE
Start: 2019-05-24

## 2019-05-24 RX ORDER — OXYCODONE AND ACETAMINOPHEN 5; 325 MG/1; MG/1
1 TABLET ORAL EVERY 4 HOURS
Refills: 0 | Status: DISCONTINUED | OUTPATIENT
Start: 2019-05-24 | End: 2019-05-28

## 2019-05-24 RX ORDER — APIXABAN 2.5 MG/1
5 TABLET, FILM COATED ORAL EVERY 12 HOURS
Refills: 0 | Status: DISCONTINUED | OUTPATIENT
Start: 2019-05-24 | End: 2019-05-28

## 2019-05-24 RX ORDER — SIMVASTATIN 20 MG/1
40 TABLET, FILM COATED ORAL AT BEDTIME
Refills: 0 | Status: DISCONTINUED | OUTPATIENT
Start: 2019-05-24 | End: 2019-05-28

## 2019-05-24 RX ORDER — GABAPENTIN 400 MG/1
100 CAPSULE ORAL THREE TIMES A DAY
Refills: 0 | Status: DISCONTINUED | OUTPATIENT
Start: 2019-05-24 | End: 2019-05-28

## 2019-05-24 RX ORDER — VALPROIC ACID (AS SODIUM SALT) 250 MG/5ML
250 SOLUTION, ORAL ORAL DAILY
Refills: 0 | Status: DISCONTINUED | OUTPATIENT
Start: 2019-05-24 | End: 2019-05-28

## 2019-05-24 RX ORDER — DIGOXIN 250 MCG
0.12 TABLET ORAL DAILY
Refills: 0 | Status: DISCONTINUED | OUTPATIENT
Start: 2019-05-24 | End: 2019-05-28

## 2019-05-24 RX ORDER — ACETAMINOPHEN 500 MG
650 TABLET ORAL EVERY 6 HOURS
Refills: 0 | Status: DISCONTINUED | OUTPATIENT
Start: 2019-05-24 | End: 2019-05-28

## 2019-05-24 RX ORDER — SODIUM CHLORIDE 9 MG/ML
1000 INJECTION INTRAMUSCULAR; INTRAVENOUS; SUBCUTANEOUS
Refills: 0 | Status: DISCONTINUED | OUTPATIENT
Start: 2019-05-24 | End: 2019-05-24

## 2019-05-24 RX ADMIN — GABAPENTIN 100 MILLIGRAM(S): 400 CAPSULE ORAL at 05:07

## 2019-05-24 RX ADMIN — Medication 650 MILLIGRAM(S): at 20:31

## 2019-05-24 RX ADMIN — Medication 650 MILLIGRAM(S): at 20:01

## 2019-05-24 RX ADMIN — Medication 1 MILLIGRAM(S): at 22:33

## 2019-05-24 RX ADMIN — GABAPENTIN 100 MILLIGRAM(S): 400 CAPSULE ORAL at 13:05

## 2019-05-24 RX ADMIN — APIXABAN 5 MILLIGRAM(S): 2.5 TABLET, FILM COATED ORAL at 17:37

## 2019-05-24 RX ADMIN — Medication 50 MILLIGRAM(S): at 22:33

## 2019-05-24 RX ADMIN — GABAPENTIN 100 MILLIGRAM(S): 400 CAPSULE ORAL at 22:34

## 2019-05-24 RX ADMIN — Medication 250 MILLIGRAM(S): at 16:36

## 2019-05-24 RX ADMIN — SIMVASTATIN 40 MILLIGRAM(S): 20 TABLET, FILM COATED ORAL at 22:34

## 2019-05-24 RX ADMIN — Medication 0.12 MILLIGRAM(S): at 05:07

## 2019-05-24 NOTE — BRIEF OPERATIVE NOTE - OPERATION/FINDINGS
s/p right foot 2nd toe amputation, closed  prior to surgery, screw head was completely exposed from distal aspect of 2nd toe w/ ulceration probing to bone over the dorsal aspect of the PIPJ  low concern for residual infection, pt can be discharged today

## 2019-05-24 NOTE — PROGRESS NOTE ADULT - SUBJECTIVE AND OBJECTIVE BOX
Patient is a 83y old  Female who presents with a chief complaint of PEG tube displaced (23 May 2019 15:09)      SUBJECTIVE / OVERNIGHT EVENTS: overnight events noted  s/p 2nd toe partial amputation    ROS:  Resp: No cough no sputum production  CVS: No chest pain no palpitations no orthopnea  GI: no N/V/D  : no dysuria, no hematuria  Neuro: no weakness no paresthesias  Heme: No petechiae no easy bruising  Msk: No joint pain no swelling  Skin: No rash no itching        MEDICATIONS  (STANDING):  apixaban 5 milliGRAM(s) Oral every 12 hours  digoxin     Tablet 0.125 milliGRAM(s) Oral daily  gabapentin   Solution 100 milliGRAM(s) Oral three times a day  LORazepam     Tablet 1 milliGRAM(s) Oral at bedtime  simvastatin 40 milliGRAM(s) Oral at bedtime  traZODone 50 milliGRAM(s) Oral at bedtime    MEDICATIONS  (PRN):  acetaminophen   Tablet .. 650 milliGRAM(s) Oral every 6 hours PRN Mild Pain (1 - 3)  oxyCODONE    5 mG/acetaminophen 325 mG 1 Tablet(s) Oral every 4 hours PRN Moderate Pain (4 - 6)        CAPILLARY BLOOD GLUCOSE        I&O's Summary    23 May 2019 07:01  -  24 May 2019 07:00  --------------------------------------------------------  IN: 1024 mL / OUT: 0 mL / NET: 1024 mL    24 May 2019 07:01  -  24 May 2019 14:09  --------------------------------------------------------  IN: 337 mL / OUT: 0 mL / NET: 337 mL        Vital Signs Last 24 Hrs  T(C): 36.2 (24 May 2019 12:09), Max: 36.7 (24 May 2019 08:35)  T(F): 97.2 (24 May 2019 12:09), Max: 98.1 (24 May 2019 08:35)  HR: 68 (24 May 2019 12:09) (58 - 77)  BP: 129/71 (24 May 2019 12:09) (90/51 - 150/73)  BP(mean): 68 (24 May 2019 10:30) (66 - 106)  RR: 18 (24 May 2019 12:09) (14 - 18)  SpO2: 98% (24 May 2019 12:09) (95% - 100%)    PHYSICAL EXAM:  GENERAL: NAD, asthenic  HEAD:  Atraumatic, Normocephalic  EYES: EOMI, PERRLA, conjunctiva and sclera clear  NECK: Supple, No JVD  CHEST/LUNG: no rhonchi, no wheeze, clear to auscultation bilaterally  HEART: S1 S2; soft ejection systolic murmur best heard at left sternal border No rubs or gallops  ABDOMEN: Soft, Nontender, Nondistended; Bowel sounds present. PEG site clean  EXTREMITIES:  No clubbing or cyanosis, + Peripheral Pulses,  no edema  right 2nd toe bandaged   PSYCH: confused  NEUROLOGY: contracted RUE  patient not cooperative  SKIN: No rashes or lesions    LABS:                      All consultant(s) notes reviewed and care discussed with other providers        Contact Number, Dr Sharma [phone]

## 2019-05-24 NOTE — CHART NOTE - NSCHARTNOTEFT_GEN_A_CORE
Nutrition Follow Up Note      Source: medical record, RN     Enteral /Parenteral Nutrition: Jevity 1.2 bolus feeds 237 ml Q6hrs to provide total formula 948ml, free water 782ml,  1137calories, 29/kg, protein 53gm, 1.35gm/kg    Patient seen for malnutrition follow up. Medical chart reviewed/events noted. Per chart, pt  presents with a chief complaint of PEG tube displaced also with right toe wound now S/P amputation of toe of right foot. Pt seen at bedside, unable to obtain subjective information as pt nonverbal with dementia. Per RN pt tolerating bolus feeds of Jevity 1.2. No GI distress reported, last BM yesterday.        Daily Weight in k.9 (05-21)- no new weight to assess       Pertinent Medications: MEDICATIONS  (STANDING):  apixaban 5 milliGRAM(s) Oral every 12 hours  digoxin     Tablet 0.125 milliGRAM(s) Oral daily  gabapentin   Solution 100 milliGRAM(s) Oral three times a day  LORazepam     Tablet 1 milliGRAM(s) Oral at bedtime  simvastatin 40 milliGRAM(s) Oral at bedtime  traZODone 50 milliGRAM(s) Oral at bedtime    MEDICATIONS  (PRN):  acetaminophen   Tablet .. 650 milliGRAM(s) Oral every 6 hours PRN Mild Pain (1 - 3)  oxyCODONE    5 mG/acetaminophen 325 mG 1 Tablet(s) Oral every 4 hours PRN Moderate Pain (4 - 6)      No edema. No pressure ulcers documented.     Estimated Needs:   [ X] no change since previous assessment  [ ] recalculated:     Previous Nutrition Diagnosis: Severe malnutrition   Nutrition Diagnosis is: ongoing, being addressed with enteral nutrition     New Nutrition Diagnosis: N/A       Recommend  1) Continue bolus feeds of Jevity 1.2 237 ml Q6hrs to provide total formula 948ml, free water 782ml,  1137calories, 29/kg, protein 53gm, 1.35gm/kg current weight 38.9kG    Monitoring and Evaluation:     Continue to monitor Nutritional intake, Tolerance to diet prescription, weights, labs, skin integrity    RD remains available upon request and will follow up per protocol    Lydia Dumont RD pager #[phone]

## 2019-05-25 RX ADMIN — Medication 0.12 MILLIGRAM(S): at 05:30

## 2019-05-25 RX ADMIN — OXYCODONE AND ACETAMINOPHEN 1 TABLET(S): 5; 325 TABLET ORAL at 16:14

## 2019-05-25 RX ADMIN — GABAPENTIN 100 MILLIGRAM(S): 400 CAPSULE ORAL at 05:30

## 2019-05-25 RX ADMIN — Medication 50 MILLIGRAM(S): at 21:40

## 2019-05-25 RX ADMIN — APIXABAN 5 MILLIGRAM(S): 2.5 TABLET, FILM COATED ORAL at 05:30

## 2019-05-25 RX ADMIN — Medication 250 MILLIGRAM(S): at 11:52

## 2019-05-25 RX ADMIN — Medication 1 MILLIGRAM(S): at 21:42

## 2019-05-25 RX ADMIN — GABAPENTIN 100 MILLIGRAM(S): 400 CAPSULE ORAL at 16:15

## 2019-05-25 RX ADMIN — APIXABAN 5 MILLIGRAM(S): 2.5 TABLET, FILM COATED ORAL at 17:03

## 2019-05-25 RX ADMIN — GABAPENTIN 100 MILLIGRAM(S): 400 CAPSULE ORAL at 21:40

## 2019-05-25 RX ADMIN — SIMVASTATIN 40 MILLIGRAM(S): 20 TABLET, FILM COATED ORAL at 21:40

## 2019-05-25 NOTE — PROGRESS NOTE ADULT - SUBJECTIVE AND OBJECTIVE BOX
Patient is a 83y old  Female who presents with a chief complaint of PEG tube displaced (24 May 2019 14:09)      SUBJECTIVE / OVERNIGHT EVENTS: overnight events noted    ROS:  Resp: No cough no sputum production  CVS: No chest pain no palpitations no orthopnea  GI: no N/V/D  : no dysuria, no hematuria  Neuro: no weakness no paresthesias  Heme: No petechiae no easy bruising  Msk: No joint pain no swelling  Skin: No rash no itching        MEDICATIONS  (STANDING):  apixaban 5 milliGRAM(s) Oral every 12 hours  digoxin     Tablet 0.125 milliGRAM(s) Oral daily  gabapentin   Solution 100 milliGRAM(s) Oral three times a day  LORazepam     Tablet 1 milliGRAM(s) Oral at bedtime  simvastatin 40 milliGRAM(s) Oral at bedtime  traZODone 50 milliGRAM(s) Oral at bedtime  valproic  acid Syrup 250 milliGRAM(s) Oral daily    MEDICATIONS  (PRN):  acetaminophen   Tablet .. 650 milliGRAM(s) Oral every 6 hours PRN Mild Pain (1 - 3)  oxyCODONE    5 mG/acetaminophen 325 mG 1 Tablet(s) Oral every 4 hours PRN Moderate Pain (4 - 6)        CAPILLARY BLOOD GLUCOSE        I&O's Summary    24 May 2019 07:01  -  25 May 2019 07:00  --------------------------------------------------------  IN: 811 mL / OUT: 0 mL / NET: 811 mL        Vital Signs Last 24 Hrs  T(C): 36.3 (25 May 2019 12:30), Max: 36.8 (24 May 2019 19:57)  T(F): 97.3 (25 May 2019 12:30), Max: 98.3 (25 May 2019 05:28)  HR: 85 (25 May 2019 12:30) (64 - 85)  BP: 140/79 (25 May 2019 12:30) (120/71 - 146/80)  BP(mean): --  RR: 16 (25 May 2019 12:30) (16 - 18)  SpO2: 99% (25 May 2019 12:30) (96% - 100%)    PHYSICAL EXAM:  GENERAL: NAD, asthenic  HEAD:  Atraumatic, Normocephalic  EYES: EOMI, PERRLA, conjunctiva and sclera clear  NECK: Supple, No JVD  CHEST/LUNG: no rhonchi, no wheeze, clear to auscultation bilaterally  HEART: S1 S2; soft ejection systolic murmur best heard at left sternal border No rubs or gallops  ABDOMEN: Soft, Nontender, Nondistended; Bowel sounds present. PEG site clean  EXTREMITIES:  No clubbing or cyanosis, + Peripheral Pulses,  no edema  right 2nd toe with visible screw in place, no drainage  PSYCH: confused  NEUROLOGY: contracted RUE  patient not cooperative  SKIN: No rashes or lesions    LABS:                        14.3   5.27  )-----------( 214      ( 24 May 2019 20:17 )             42.9     05-24    142  |  104  |  10  ----------------------------<  148<H>  4.1   |  28  |  0.53    Ca    8.9      24 May 2019 14:14      PT/INR - ( 24 May 2019 19:49 )   PT: 11.2 sec;   INR: 0.97 ratio         PTT - ( 24 May 2019 19:49 )  PTT:31.1 sec            All consultant(s) notes reviewed and care discussed with other providers        Contact Number, Dr Sharma [phone]

## 2019-05-25 NOTE — ED PROVIDER NOTE - TEMPLATE, MLM
Height: 5'7" Weight: 128lbs, IBW 148lbs+/-10%, %IBW 86%, BMI 20  ABW used for calculations as pt between % of IBW.  Nutrient needs based on [facility] standards of care for maintenance in older adults.   Needs adjusted for age, weight repletion and pressure ulcer VIEW ALL

## 2019-05-26 RX ADMIN — GABAPENTIN 100 MILLIGRAM(S): 400 CAPSULE ORAL at 22:56

## 2019-05-26 RX ADMIN — GABAPENTIN 100 MILLIGRAM(S): 400 CAPSULE ORAL at 13:44

## 2019-05-26 RX ADMIN — APIXABAN 5 MILLIGRAM(S): 2.5 TABLET, FILM COATED ORAL at 19:01

## 2019-05-26 RX ADMIN — Medication 1 MILLIGRAM(S): at 22:56

## 2019-05-26 RX ADMIN — Medication 0.12 MILLIGRAM(S): at 05:03

## 2019-05-26 RX ADMIN — SIMVASTATIN 40 MILLIGRAM(S): 20 TABLET, FILM COATED ORAL at 22:56

## 2019-05-26 RX ADMIN — GABAPENTIN 100 MILLIGRAM(S): 400 CAPSULE ORAL at 05:03

## 2019-05-26 RX ADMIN — Medication 250 MILLIGRAM(S): at 13:44

## 2019-05-26 RX ADMIN — Medication 50 MILLIGRAM(S): at 22:56

## 2019-05-26 RX ADMIN — APIXABAN 5 MILLIGRAM(S): 2.5 TABLET, FILM COATED ORAL at 05:03

## 2019-05-26 NOTE — CHART NOTE - NSCHARTNOTEFT_GEN_A_CORE
Pt remains stable for discharge, understand that family will be available Tuesday for safe discharge home  Will break down surgical dressing Monday or Tuesday prior to discharge  Discharge instructions previously completed

## 2019-05-26 NOTE — PROGRESS NOTE ADULT - SUBJECTIVE AND OBJECTIVE BOX
Patient is a 83y old  Female who presents with a chief complaint of PEG tube displaced (25 May 2019 13:22)      SUBJECTIVE / OVERNIGHT EVENTS: overnight events noted    ROS:  Resp: No cough no sputum production  CVS: No chest pain no palpitations no orthopnea  GI: no N/V/D  : no dysuria, no hematuria  Neuro: no weakness no paresthesias  Heme: No petechiae no easy bruising  Msk: No joint pain no swelling  Skin: No rash no itching        MEDICATIONS  (STANDING):  apixaban 5 milliGRAM(s) Oral every 12 hours  digoxin     Tablet 0.125 milliGRAM(s) Oral daily  gabapentin   Solution 100 milliGRAM(s) Oral three times a day  LORazepam     Tablet 1 milliGRAM(s) Oral at bedtime  simvastatin 40 milliGRAM(s) Oral at bedtime  traZODone 50 milliGRAM(s) Oral at bedtime  valproic  acid Syrup 250 milliGRAM(s) Oral daily    MEDICATIONS  (PRN):  acetaminophen   Tablet .. 650 milliGRAM(s) Oral every 6 hours PRN Mild Pain (1 - 3)  oxyCODONE    5 mG/acetaminophen 325 mG 1 Tablet(s) Oral every 4 hours PRN Moderate Pain (4 - 6)        CAPILLARY BLOOD GLUCOSE        I&O's Summary    25 May 2019 07:01  -  26 May 2019 07:00  --------------------------------------------------------  IN: 1448 mL / OUT: 0 mL / NET: 1448 mL        Vital Signs Last 24 Hrs  T(C): 36.7 (26 May 2019 14:01), Max: 37.4 (26 May 2019 04:38)  T(F): 98.1 (26 May 2019 14:01), Max: 99.4 (26 May 2019 04:38)  HR: 94 (26 May 2019 14:01) (86 - 94)  BP: 134/74 (26 May 2019 14:01) (134/74 - 149/87)  BP(mean): --  RR: 18 (26 May 2019 14:01) (18 - 18)  SpO2: 97% (26 May 2019 14:01) (97% - 100%)    PHYSICAL EXAM:  GENERAL: NAD, asthenic  HEAD:  Atraumatic, Normocephalic  EYES: EOMI, PERRLA, conjunctiva and sclera clear  NECK: Supple, No JVD  CHEST/LUNG: no rhonchi, no wheeze, clear to auscultation bilaterally  HEART: S1 S2; soft ejection systolic murmur best heard at left sternal border No rubs or gallops  ABDOMEN: Soft, Nontender, Nondistended; Bowel sounds present. PEG site clean  EXTREMITIES:  No clubbing or cyanosis, + Peripheral Pulses,  no edema  right 2nd toe in bandage  PSYCH: confused  NEUROLOGY: contracted RUE  SKIN: No rashes or lesions    LABS:                        14.3   5.27  )-----------( 214      ( 24 May 2019 20:17 )             42.9           PT/INR - ( 24 May 2019 19:49 )   PT: 11.2 sec;   INR: 0.97 ratio         PTT - ( 24 May 2019 19:49 )  PTT:31.1 sec            All consultant(s) notes reviewed and care discussed with other providers        Contact Number, Dr Sharma [phone]

## 2019-05-27 RX ADMIN — SIMVASTATIN 40 MILLIGRAM(S): 20 TABLET, FILM COATED ORAL at 21:29

## 2019-05-27 RX ADMIN — Medication 1 MILLIGRAM(S): at 21:29

## 2019-05-27 RX ADMIN — Medication 50 MILLIGRAM(S): at 21:29

## 2019-05-27 RX ADMIN — GABAPENTIN 100 MILLIGRAM(S): 400 CAPSULE ORAL at 05:43

## 2019-05-27 RX ADMIN — Medication 0.12 MILLIGRAM(S): at 05:43

## 2019-05-27 RX ADMIN — GABAPENTIN 100 MILLIGRAM(S): 400 CAPSULE ORAL at 21:29

## 2019-05-27 RX ADMIN — GABAPENTIN 100 MILLIGRAM(S): 400 CAPSULE ORAL at 16:07

## 2019-05-27 RX ADMIN — APIXABAN 5 MILLIGRAM(S): 2.5 TABLET, FILM COATED ORAL at 05:43

## 2019-05-27 RX ADMIN — APIXABAN 5 MILLIGRAM(S): 2.5 TABLET, FILM COATED ORAL at 17:04

## 2019-05-27 RX ADMIN — Medication 250 MILLIGRAM(S): at 11:10

## 2019-05-27 NOTE — PROGRESS NOTE ADULT - SUBJECTIVE AND OBJECTIVE BOX
Patient is a 83y old  Female who presents with a chief complaint of PEG tube displaced (26 May 2019 14:52)      SUBJECTIVE / OVERNIGHT EVENTS: overnight events noted    ROS:  Resp: No cough no sputum production  CVS: No chest pain no palpitations no orthopnea  GI: no N/V/D  : no dysuria, no hematuria  Neuro: no weakness no paresthesias  Heme: No petechiae no easy bruising  Msk: No joint pain no swelling  Skin: No rash no itching        MEDICATIONS  (STANDING):  apixaban 5 milliGRAM(s) Oral every 12 hours  digoxin     Tablet 0.125 milliGRAM(s) Oral daily  gabapentin   Solution 100 milliGRAM(s) Oral three times a day  LORazepam     Tablet 1 milliGRAM(s) Oral at bedtime  simvastatin 40 milliGRAM(s) Oral at bedtime  traZODone 50 milliGRAM(s) Oral at bedtime  valproic  acid Syrup 250 milliGRAM(s) Oral daily    MEDICATIONS  (PRN):  acetaminophen   Tablet .. 650 milliGRAM(s) Oral every 6 hours PRN Mild Pain (1 - 3)  oxyCODONE    5 mG/acetaminophen 325 mG 1 Tablet(s) Oral every 4 hours PRN Moderate Pain (4 - 6)        CAPILLARY BLOOD GLUCOSE        I&O's Summary    26 May 2019 07:01  -  27 May 2019 07:00  --------------------------------------------------------  IN: 1449 mL / OUT: 0 mL / NET: 1449 mL        Vital Signs Last 24 Hrs  T(C): 36.8 (27 May 2019 05:41), Max: 36.8 (27 May 2019 05:41)  T(F): 98.2 (27 May 2019 05:41), Max: 98.2 (27 May 2019 05:41)  HR: 95 (27 May 2019 05:41) (84 - 95)  BP: 117/72 (27 May 2019 05:41) (117/72 - 134/74)  BP(mean): --  RR: 16 (27 May 2019 05:41) (16 - 18)  SpO2: 98% (27 May 2019 05:41) (97% - 98%)    PHYSICAL EXAM:  GENERAL: NAD, asthenic  HEAD:  Atraumatic, Normocephalic  EYES: EOMI, PERRLA, conjunctiva and sclera clear  NECK: Supple, No JVD  CHEST/LUNG: no rhonchi, no wheeze, clear to auscultation bilaterally  HEART: S1 S2; soft ejection systolic murmur best heard at left sternal border No rubs or gallops  ABDOMEN: Soft, Nontender, Nondistended; Bowel sounds present. PEG site clean  EXTREMITIES:  No clubbing or cyanosis, + Peripheral Pulses,  no edema  right 2nd toe in bandage  PSYCH: confused  NEUROLOGY: contracted RUE  SKIN: No rashes or lesions    LABS:                      All consultant(s) notes reviewed and care discussed with other providers        Contact Number, Dr Sharma [phone]

## 2019-05-28 ENCOUNTER — TRANSCRIPTION ENCOUNTER (OUTPATIENT)
Age: 84
End: 2019-05-28

## 2019-05-28 VITALS
SYSTOLIC BLOOD PRESSURE: 133 MMHG | TEMPERATURE: 98 F | OXYGEN SATURATION: 100 % | HEART RATE: 90 BPM | RESPIRATION RATE: 18 BRPM | DIASTOLIC BLOOD PRESSURE: 78 MMHG

## 2019-05-28 PROCEDURE — L8699: CPT

## 2019-05-28 PROCEDURE — 80048 BASIC METABOLIC PNL TOTAL CA: CPT

## 2019-05-28 PROCEDURE — 86900 BLOOD TYPING SEROLOGIC ABO: CPT

## 2019-05-28 PROCEDURE — 99285 EMERGENCY DEPT VISIT HI MDM: CPT

## 2019-05-28 PROCEDURE — 88305 TISSUE EXAM BY PATHOLOGIST: CPT

## 2019-05-28 PROCEDURE — 85610 PROTHROMBIN TIME: CPT

## 2019-05-28 PROCEDURE — 88311 DECALCIFY TISSUE: CPT

## 2019-05-28 PROCEDURE — 86901 BLOOD TYPING SEROLOGIC RH(D): CPT

## 2019-05-28 PROCEDURE — 87070 CULTURE OTHR SPECIMN AEROBIC: CPT

## 2019-05-28 PROCEDURE — 83036 HEMOGLOBIN GLYCOSYLATED A1C: CPT

## 2019-05-28 PROCEDURE — 86850 RBC ANTIBODY SCREEN: CPT

## 2019-05-28 PROCEDURE — 85652 RBC SED RATE AUTOMATED: CPT

## 2019-05-28 PROCEDURE — 97530 THERAPEUTIC ACTIVITIES: CPT

## 2019-05-28 PROCEDURE — 80162 ASSAY OF DIGOXIN TOTAL: CPT

## 2019-05-28 PROCEDURE — 73630 X-RAY EXAM OF FOOT: CPT

## 2019-05-28 PROCEDURE — 85730 THROMBOPLASTIN TIME PARTIAL: CPT

## 2019-05-28 PROCEDURE — 83735 ASSAY OF MAGNESIUM: CPT

## 2019-05-28 PROCEDURE — 80053 COMPREHEN METABOLIC PANEL: CPT

## 2019-05-28 PROCEDURE — 85027 COMPLETE CBC AUTOMATED: CPT

## 2019-05-28 PROCEDURE — 97162 PT EVAL MOD COMPLEX 30 MIN: CPT

## 2019-05-28 RX ADMIN — APIXABAN 5 MILLIGRAM(S): 2.5 TABLET, FILM COATED ORAL at 05:05

## 2019-05-28 RX ADMIN — Medication 0.12 MILLIGRAM(S): at 05:05

## 2019-05-28 RX ADMIN — Medication 250 MILLIGRAM(S): at 11:42

## 2019-05-28 RX ADMIN — GABAPENTIN 100 MILLIGRAM(S): 400 CAPSULE ORAL at 05:05

## 2019-05-28 NOTE — PROGRESS NOTE ADULT - PROBLEM SELECTOR PROBLEM 5
Toe finding

## 2019-05-28 NOTE — PROGRESS NOTE ADULT - PROBLEM SELECTOR PLAN 6
already on Apixaban
already on Apixaban
resume Apixaban post amputation
already on Apixaban
Holding Eliquis for PEG replacement  Keep Mg > 2  K>4
already on Apixaban
resume Apixaban post amputation
resume Apixaban post amputation
resume Apixaban when able

## 2019-05-28 NOTE — PROGRESS NOTE ADULT - ATTENDING COMMENTS
discharge home  son refusing home care  discussed with patient's son at the bedside in detail
Patient seen and examined. Agree with above.
discharge planning
discharge planning tomorrow  podiatry to address amputation sit before discharge
discharge planning
discharge planning
patient is medically optimized for procedure tomorrow
Jeff Ross  Attending Physician   Division of Infectious Disease  Pager #[phone]  After 5pm/weekend or no response, call #[phone]    Will sign off, recall ID if needed #[phone].

## 2019-05-28 NOTE — DISCHARGE NOTE NURSING/CASE MANAGEMENT/SOCIAL WORK - NSDCPEPTSTRK_GEN_ALL_CORE
Stroke support groups for patients, families, and friends/Risk factors for stroke/Stroke education booklet/Stroke warning signs and symptoms/Signs and symptoms of stroke/Call 911 for stroke/Need for follow up after discharge/Prescribed medications

## 2019-05-28 NOTE — PROGRESS NOTE ADULT - PROBLEM SELECTOR PLAN 4
continue AED  seizure free
Continue Valproic acid 250mg IV (converted from solution) daily
continue AED  seizure free
Continue Valproic acid 250mg IV (converted from solution) daily
Continue Valproic acid 250mg IV (converted from solution) daily
continue AED  seizure free

## 2019-05-28 NOTE — PROGRESS NOTE ADULT - PROBLEM SELECTOR PLAN 3
CHADSVASC 5  continue Apixaban
CHADSVASC 5  HR controlled  hold Apixaban for now
CHADSVASC 5  continue Apixaban
CHADSVASC 5  HR controlled  hold Apixaban for now
CHADSVASC 5  HR controlled  hold Apixaban for now
CHADSVASC 5  HR controlled  resume Apixaban when cleared by GI
CHADSVASC 5  continue Apixaban
MAYRA 5  Holding Eliquis for PEG placement Tuesday or Wednesday  Digoxin 125mcg PO daily converted to IV 0.1mg IV daily
CHADSVASC 5  continue Apixaban

## 2019-05-28 NOTE — CHART NOTE - NSCHARTNOTEFT_GEN_A_CORE
follow up- cleared by MD for discharge home with home care; pt son refusing home care; discussed discharge medication/follow up.  Myra Lopez(NP)  [address], [phone]

## 2019-05-28 NOTE — PROGRESS NOTE ADULT - ASSESSMENT
84yo F s/p R foot 2nd toe amputation - closed    ·	pt seen and evaluated   ·	R foot surgical site well coapted with no signs of infection, no dehiscence   ·	new dressing applied  ·	keep dressing clean dry and intact until follow up  ·	stable for d/c from podiatry standpoint   ·	seen w/ attending

## 2019-05-28 NOTE — PROGRESS NOTE ADULT - SUBJECTIVE AND OBJECTIVE BOX
Patient is a 83y old  Female who presents with a chief complaint of PEG tube displaced (27 May 2019 10:30)       INTERVAL HPI/OVERNIGHT EVENTS:  Patient seen and evaluated at bedside.  Pt is resting comfortable in NAD. Denies N/V/F/C.      Allergies    No Known Allergies    Intolerances        Vital Signs Last 24 Hrs  T(C): 37.2 (28 May 2019 04:44), Max: 37.2 (28 May 2019 04:44)  T(F): 99 (28 May 2019 04:44), Max: 99 (28 May 2019 04:44)  HR: 100 (28 May 2019 04:44) (90 - 100)  BP: 135/66 (28 May 2019 04:44) (114/71 - 135/66)  BP(mean): --  RR: 18 (28 May 2019 04:44) (18 - 18)  SpO2: 99% (28 May 2019 04:44) (97% - 100%)    LABS:              CAPILLARY BLOOD GLUCOSE          Lower Extremity Physical Exam:  s/p R foot 2nd toe amputation - closed, sutures intact, no dehiscence, well coapted no signs of infection, no hematoma formation Patient is a 83y old  Female who presents with a chief complaint of PEG tube displaced (27 May 2019 10:30)       INTERVAL HPI/OVERNIGHT EVENTS:  Patient seen and evaluated at bedside.  Pt is resting comfortable in NAD.      Allergies    No Known Allergies    Intolerances        Vital Signs Last 24 Hrs  T(C): 37.2 (28 May 2019 04:44), Max: 37.2 (28 May 2019 04:44)  T(F): 99 (28 May 2019 04:44), Max: 99 (28 May 2019 04:44)  HR: 100 (28 May 2019 04:44) (90 - 100)  BP: 135/66 (28 May 2019 04:44) (114/71 - 135/66)  BP(mean): --  RR: 18 (28 May 2019 04:44) (18 - 18)  SpO2: 99% (28 May 2019 04:44) (97% - 100%)    LABS:              CAPILLARY BLOOD GLUCOSE          Lower Extremity Physical Exam:  s/p R foot 2nd toe amputation - closed, sutures intact, no dehiscence, well coapted no signs of infection, no hematoma formation

## 2019-05-28 NOTE — PROGRESS NOTE ADULT - PROBLEM SELECTOR PLAN 5
2nd right toe tip amputation done  continue to follow podiatry recommendations  local care
hallux amputation right Friday  agree with podiatry and ID  amputation far safer option to avoid future osteomyelitis  patient is medically optimized for procedure
2nd right toe tip amputation done  continue to follow podiatry recommendations  local care
2nd right toe tip amputation done  continue to follow podiatry recommendations
2nd right toe tip amputation done  continue to follow podiatry recommendations  local care
2nd toe tip amputation right Friday  agree with podiatry and ID  amputation far safer option to avoid future osteomyelitis
Has screw protruding from right 2nd toe. Not infected has been there for years.  Podiatry evaluation today pending
podiatry help appreciated  will defer to family wishes  possible hallux amputation  ESR 8  unlikely OM   ID evaluation
2nd right toe tip amputation done  continue to follow podiatry recommendations  local care

## 2019-05-28 NOTE — PROGRESS NOTE ADULT - PROBLEM SELECTOR PLAN 1
replaced  will continue to monitor  continue feeds  hold Apixaban for right hallux amputation  resume Apixaban
GI following for PEG replacement. Holding Eliquis until Tuesday/Wednesday. Converted Digoxin and Valproic acid to IV and will give D5/.5NS @80mls/hr for hydration/nutrition.
for replacement today  resume Apixaban after GI clear
replaced  will continue to monitor  continue feeds  hold Apixaban for right hallux amputation  resume Apixaban
replaced  will continue to monitor  continue feeds  hold Apixaban for right hallux amputation  resume Apixaban after GI clear
replaced  will continue to monitor  resume feeds  hold Apixaban for right hallux amputation  resume Apixaban after GI clear
replaced  will continue to monitor  continue feeds  hold Apixaban for right hallux amputation  resume Apixaban
replaced  will continue to monitor  continue feeds  hold Apixaban for right hallux amputation  resume Apixaban
replaced  will continue to monitor  resume feeds  hold Apixaban for right hallux amputation  resume Apixaban after GI clear
-no fever  -no systemic s/s of infection  -inflammatory markers normal  -favor toe amputation - risk of infection in future present   -no role for abx currently  -risk of abx > benefit

## 2019-05-28 NOTE — PROGRESS NOTE ADULT - REASON FOR ADMISSION
PEG tube displaced

## 2019-05-28 NOTE — PROGRESS NOTE ADULT - SUBJECTIVE AND OBJECTIVE BOX
Patient is a 83y old  Female who presents with a chief complaint of PEG tube displaced (28 May 2019 08:22)      SUBJECTIVE / OVERNIGHT EVENTS: overnight events noted    ROS:  Resp: No cough no sputum production  CVS: No chest pain no palpitations no orthopnea  GI: no N/V/D  : no dysuria, no hematuria  Neuro: no weakness no paresthesias  Heme: No petechiae no easy bruising  Msk: No joint pain no swelling  Skin: No rash no itching        MEDICATIONS  (STANDING):  apixaban 5 milliGRAM(s) Oral every 12 hours  digoxin     Tablet 0.125 milliGRAM(s) Oral daily  gabapentin   Solution 100 milliGRAM(s) Oral three times a day  LORazepam     Tablet 1 milliGRAM(s) Oral at bedtime  simvastatin 40 milliGRAM(s) Oral at bedtime  traZODone 50 milliGRAM(s) Oral at bedtime  valproic  acid Syrup 250 milliGRAM(s) Oral daily    MEDICATIONS  (PRN):  acetaminophen   Tablet .. 650 milliGRAM(s) Oral every 6 hours PRN Mild Pain (1 - 3)  oxyCODONE    5 mG/acetaminophen 325 mG 1 Tablet(s) Oral every 4 hours PRN Moderate Pain (4 - 6)        CAPILLARY BLOOD GLUCOSE        I&O's Summary    27 May 2019 07:01  -  28 May 2019 07:00  --------------------------------------------------------  IN: 1498 mL / OUT: 0 mL / NET: 1498 mL        Vital Signs Last 24 Hrs  T(C): 36.9 (28 May 2019 11:36), Max: 37.2 (28 May 2019 04:44)  T(F): 98.4 (28 May 2019 11:36), Max: 99 (28 May 2019 04:44)  HR: 90 (28 May 2019 11:36) (90 - 100)  BP: 133/78 (28 May 2019 11:36) (114/71 - 135/66)  BP(mean): --  RR: 18 (28 May 2019 11:36) (18 - 18)  SpO2: 100% (28 May 2019 11:36) (97% - 100%)    PHYSICAL EXAM:  GENERAL: NAD, asthenic  HEAD:  Atraumatic, Normocephalic  EYES: EOMI, PERRLA, conjunctiva and sclera clear  NECK: Supple, No JVD  CHEST/LUNG: no rhonchi, no wheeze, clear to auscultation bilaterally  HEART: S1 S2; soft ejection systolic murmur best heard at left sternal border No rubs or gallops  ABDOMEN: Soft, Nontender, Nondistended; Bowel sounds present. PEG site clean  EXTREMITIES:  No clubbing or cyanosis, + Peripheral Pulses,  no edema  right 2nd toe in bandage  PSYCH: confused  NEUROLOGY: contracted RUE  SKIN: No rashes or lesions    LABS:                      All consultant(s) notes reviewed and care discussed with other providers        Contact Number, Dr Sharma [phone]

## 2019-05-28 NOTE — PROGRESS NOTE ADULT - PROBLEM SELECTOR PROBLEM 4
Dementia with behavioral disturbance

## 2019-05-28 NOTE — DISCHARGE NOTE NURSING/CASE MANAGEMENT/SOCIAL WORK - NSDCDPATPORTLINK_GEN_ALL_CORE
You can access the [facility] Patient Portal, offered by [facility], by registering with the following website: [email or web address]

## 2019-05-28 NOTE — PROGRESS NOTE ADULT - PROBLEM SELECTOR PROBLEM 1
PEG tube malfunction
Toe osteomyelitis

## 2019-05-29 LAB
CULTURE RESULTS: SIGNIFICANT CHANGE UP
SPECIMEN SOURCE: SIGNIFICANT CHANGE UP

## 2019-10-09 ENCOUNTER — INBOUND DOCUMENT (OUTPATIENT)
Age: 84
End: 2019-10-09

## 2020-03-06 NOTE — H&P ADULT - CARDIOVASCULAR
-- DO NOT REPLY / DO NOT REPLY ALL --  -- Message is from the [facility]--    General Patient Message      Reason for Call: Pharmacist is calling following up for a status update on the three prescriptions for the patient that need to be corrected. Please give the pharmacist a call back as soon as possible with an update.    Caller Information       Type Contact Phone    03/06/2020 08:56 AM Phone (Incoming) [id number] - [place], [place] - [address] (Pharmacy) [phone]          Alternative phone number:     Turnaround time given to caller:   \"This message will be sent to [state Provider's name]. The clinical team will fulfill your request as soon as they review your message.\"}    
Called back again and spoke with representative, clarified questions regarding prescriptions  
Representative Bernal called again, in order to give Vit C 1,500 in one tablet, they have it in extended release form, if it is ok to give. Gave the ok.  
Spoke with Jose Angel, but at the moment pt's account was locked. To call back later.   
Regular rate & rhythm, normal S1, S2; no murmurs, gallops or rubs; no S3, S4

## 2020-03-11 NOTE — ED PROVIDER NOTE - NS ED MD DISPO DIVISION
Eprescribed to pharmacy  Last office visit 9/26/19  Patient has a follow up on none   [facility]

## 2020-05-27 ENCOUNTER — INPATIENT (INPATIENT)
Facility: HOSPITAL | Age: 85
LOS: 2 days | Discharge: HOME | DRG: 394 | End: 2020-05-30
Attending: STUDENT IN AN ORGANIZED HEALTH CARE EDUCATION/TRAINING PROGRAM | Admitting: STUDENT IN AN ORGANIZED HEALTH CARE EDUCATION/TRAINING PROGRAM
Payer: MEDICARE

## 2020-05-27 VITALS
RESPIRATION RATE: 20 BRPM | SYSTOLIC BLOOD PRESSURE: 147 MMHG | TEMPERATURE: 98 F | OXYGEN SATURATION: 98 % | WEIGHT: 100.09 LBS | HEIGHT: 60 IN | DIASTOLIC BLOOD PRESSURE: 96 MMHG | HEART RATE: 76 BPM

## 2020-05-27 DIAGNOSIS — Z43.1 ENCOUNTER FOR ATTENTION TO GASTROSTOMY: ICD-10-CM

## 2020-05-27 LAB
ALBUMIN SERPL ELPH-MCNC: 3.4 G/DL — SIGNIFICANT CHANGE UP (ref 3.3–5)
ALP SERPL-CCNC: 80 U/L — SIGNIFICANT CHANGE UP (ref 40–120)
ALT FLD-CCNC: 63 U/L — HIGH (ref 10–45)
ANION GAP SERPL CALC-SCNC: 10 MMOL/L — SIGNIFICANT CHANGE UP (ref 5–17)
APTT BLD: 33.5 SEC — SIGNIFICANT CHANGE UP (ref 27.5–36.3)
AST SERPL-CCNC: 84 U/L — HIGH (ref 10–40)
BASOPHILS # BLD AUTO: 0.04 K/UL — SIGNIFICANT CHANGE UP (ref 0–0.2)
BASOPHILS NFR BLD AUTO: 0.5 % — SIGNIFICANT CHANGE UP (ref 0–2)
BILIRUB SERPL-MCNC: 1.2 MG/DL — SIGNIFICANT CHANGE UP (ref 0.2–1.2)
BUN SERPL-MCNC: 23 MG/DL — SIGNIFICANT CHANGE UP (ref 7–23)
CALCIUM SERPL-MCNC: 8.8 MG/DL — SIGNIFICANT CHANGE UP (ref 8.4–10.5)
CHLORIDE SERPL-SCNC: 105 MMOL/L — SIGNIFICANT CHANGE UP (ref 96–108)
CO2 SERPL-SCNC: 26 MMOL/L — SIGNIFICANT CHANGE UP (ref 22–31)
CREAT SERPL-MCNC: 0.49 MG/DL — LOW (ref 0.5–1.3)
EOSINOPHIL # BLD AUTO: 0.12 K/UL — SIGNIFICANT CHANGE UP (ref 0–0.5)
EOSINOPHIL NFR BLD AUTO: 1.5 % — SIGNIFICANT CHANGE UP (ref 0–6)
GLUCOSE SERPL-MCNC: 97 MG/DL — SIGNIFICANT CHANGE UP (ref 70–99)
HCT VFR BLD CALC: 42.5 % — SIGNIFICANT CHANGE UP (ref 34.5–45)
HGB BLD-MCNC: 14.3 G/DL — SIGNIFICANT CHANGE UP (ref 11.5–15.5)
IMM GRANULOCYTES NFR BLD AUTO: 0.4 % — SIGNIFICANT CHANGE UP (ref 0–1.5)
INR BLD: 1.19 RATIO — HIGH (ref 0.88–1.16)
LYMPHOCYTES # BLD AUTO: 2.01 K/UL — SIGNIFICANT CHANGE UP (ref 1–3.3)
LYMPHOCYTES # BLD AUTO: 25.7 % — SIGNIFICANT CHANGE UP (ref 13–44)
MCHC RBC-ENTMCNC: 33.5 PG — SIGNIFICANT CHANGE UP (ref 27–34)
MCHC RBC-ENTMCNC: 33.6 GM/DL — SIGNIFICANT CHANGE UP (ref 32–36)
MCV RBC AUTO: 99.5 FL — SIGNIFICANT CHANGE UP (ref 80–100)
MONOCYTES # BLD AUTO: 0.71 K/UL — SIGNIFICANT CHANGE UP (ref 0–0.9)
MONOCYTES NFR BLD AUTO: 9.1 % — SIGNIFICANT CHANGE UP (ref 2–14)
NEUTROPHILS # BLD AUTO: 4.91 K/UL — SIGNIFICANT CHANGE UP (ref 1.8–7.4)
NEUTROPHILS NFR BLD AUTO: 62.8 % — SIGNIFICANT CHANGE UP (ref 43–77)
NRBC # BLD: 0 /100 WBCS — SIGNIFICANT CHANGE UP (ref 0–0)
PLATELET # BLD AUTO: 194 K/UL — SIGNIFICANT CHANGE UP (ref 150–400)
POTASSIUM SERPL-MCNC: 4.7 MMOL/L — SIGNIFICANT CHANGE UP (ref 3.5–5.3)
POTASSIUM SERPL-SCNC: 4.7 MMOL/L — SIGNIFICANT CHANGE UP (ref 3.5–5.3)
PROT SERPL-MCNC: 6.7 G/DL — SIGNIFICANT CHANGE UP (ref 6–8.3)
PROTHROM AB SERPL-ACNC: 13.6 SEC — HIGH (ref 10–12.9)
RBC # BLD: 4.27 M/UL — SIGNIFICANT CHANGE UP (ref 3.8–5.2)
RBC # FLD: 14 % — SIGNIFICANT CHANGE UP (ref 10.3–14.5)
SODIUM SERPL-SCNC: 141 MMOL/L — SIGNIFICANT CHANGE UP (ref 135–145)
WBC # BLD: 7.82 K/UL — SIGNIFICANT CHANGE UP (ref 3.8–10.5)
WBC # FLD AUTO: 7.82 K/UL — SIGNIFICANT CHANGE UP (ref 3.8–10.5)

## 2020-05-27 PROCEDURE — 99285 EMERGENCY DEPT VISIT HI MDM: CPT

## 2020-05-27 NOTE — ED PROVIDER NOTE - PHYSICAL EXAMINATION
General: NAD, good hygiene, well developed  HENT: Atraumatic, EOMI, no conjunctivae injection, moist mucosa  Neck: normal ROM and trachea midline   Cardiovascular: RRR, S1&2, no M or R, radial pulses equal and b/l  Respiratory: CTABL, no wheezes or crackles, no decreased breath sounds  Abdominal: soft and non-tender non distended, neg for guarding, no CVA tenderness   Extremities: no edema of the legs/feet, DP/PT equal b/l  Skin: warm, well perfused  Neurologic: nonfocal, AAOx3  Psych: normal mood and affect General: NAD, good hygiene, well developed  HENT: Atraumatic, EOMI, no conjunctivae injection, moist mucosa  Neck: normal ROM and trachea midline   Cardiovascular: RRR, S1&2, no M or R, radial pulses equal and b/l  Respiratory: CTABL, no wheezes or crackles, no decreased breath sounds  Abdominal: soft and non-tender non distended, stoma at the left mid abd, neg for guarding, no CVA tenderness   Extremities: right sided contraction, no edema of the legs/feet, DP/PT equal b/l  Skin: warm, well perfused  Neurologic: nonfocal, AAOx3  Psych: nonverbal

## 2020-05-27 NOTE — ED PROVIDER NOTE - ATTENDING CONTRIBUTION TO CARE
I saw and evaluated patient with resident. I discussed H+P and MDM with resident. I agree with the statements made by the resident unless otherwise noted.    The care of this patient was in support of the [facility] countermeasures to Covid-19.

## 2020-05-27 NOTE — ED PROVIDER NOTE - OBJECTIVE STATEMENT
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 family denied any other issues. 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 nonverbal baseline. family denied any other issues, no fever or diarrhea.

## 2020-05-27 NOTE — ED ADULT NURSE NOTE - NSIMPLEMENTINTERV_GEN_ALL_ED
Implemented All Fall with Harm Risk Interventions:  [place] to call system. Call bell, personal items and telephone within reach. Instruct patient to call for assistance. Room bathroom lighting operational. Non-slip footwear when patient is off stretcher. Physically safe environment: no spills, clutter or unnecessary equipment. Stretcher in lowest position, wheels locked, appropriate side rails in place. Provide visual cue, wrist band, yellow gown, etc. Monitor gait and stability. Monitor for mental status changes and reorient to person, place, and time. Review medications for side effects contributing to fall risk. Reinforce activity limits and safety measures with patient and family. Provide visual clues: red socks.

## 2020-05-27 NOTE — ED ADULT NURSE NOTE - OBJECTIVE STATEMENT
84y female arrived to ED for displaced PEG. 84y female arrived to ED for displaced PEG. PMHx CVA, bedbound, PEG tube. Patient Sami speaking. Brought in with son at the bedside s/p displacing PEG tube. Patients son reports tube was pulled out ~18:30 this evening. Minimal bleeding at the site. Patient A&Ox0, VS stable. No other complaints at this time. Limited Hx from son.

## 2020-05-27 NOTE — ED PROVIDER NOTE - CLINICAL SUMMARY MEDICAL DECISION MAKING FREE TEXT BOX
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 nonverbal baseline who is BIB son s/p pulling PEG tube. States pt was at home with aide, who did not notice PEG tube dislodged until 3-4 hrs later. Pt has otherwise been at her baseline, afebrile, no n/v/d. Unable to pass PEG tube in ED (as small as 14 f), therefore, pt requiring admission for endoscopic PEG placement. Pt has had admissions for same in past.

## 2020-05-27 NOTE — ED ADULT NURSE REASSESSMENT NOTE - NS ED NURSE REASSESS COMMENT FT1
Report received from RN Kindra, PT nonverbal baseline son bedside. IV access obtained in L hand via 20G catheter, labs drawn and sent. Plan to admit for PEG placement AM.

## 2020-05-28 ENCOUNTER — TRANSCRIPTION ENCOUNTER (OUTPATIENT)
Age: 85
End: 2020-05-28

## 2020-05-28 DIAGNOSIS — Z29.9 ENCOUNTER FOR PROPHYLACTIC MEASURES, UNSPECIFIED: ICD-10-CM

## 2020-05-28 DIAGNOSIS — Z02.9 ENCOUNTER FOR ADMINISTRATIVE EXAMINATIONS, UNSPECIFIED: ICD-10-CM

## 2020-05-28 DIAGNOSIS — Z43.1 ENCOUNTER FOR ATTENTION TO GASTROSTOMY: ICD-10-CM

## 2020-05-28 DIAGNOSIS — R74.0 NONSPECIFIC ELEVATION OF LEVELS OF TRANSAMINASE AND LACTIC ACID DEHYDROGENASE [LDH]: ICD-10-CM

## 2020-05-28 DIAGNOSIS — I48.20 CHRONIC ATRIAL FIBRILLATION, UNSPECIFIED: ICD-10-CM

## 2020-05-28 LAB
BLD GP AB SCN SERPL QL: NEGATIVE — SIGNIFICANT CHANGE UP
RH IG SCN BLD-IMP: POSITIVE — SIGNIFICANT CHANGE UP
SARS-COV-2 RNA SPEC QL NAA+PROBE: SIGNIFICANT CHANGE UP

## 2020-05-28 PROCEDURE — 99223 1ST HOSP IP/OBS HIGH 75: CPT

## 2020-05-28 PROCEDURE — 12345: CPT | Mod: NC

## 2020-05-28 PROCEDURE — 71045 X-RAY EXAM CHEST 1 VIEW: CPT | Mod: 26

## 2020-05-28 PROCEDURE — 99233 SBSQ HOSP IP/OBS HIGH 50: CPT | Mod: GC

## 2020-05-28 RX ORDER — DIGOXIN 250 MCG
0.12 TABLET ORAL DAILY
Refills: 0 | Status: DISCONTINUED | OUTPATIENT
Start: 2020-05-28 | End: 2020-05-28

## 2020-05-28 RX ORDER — HALOPERIDOL DECANOATE 100 MG/ML
0.5 INJECTION INTRAMUSCULAR ONCE
Refills: 0 | Status: DISCONTINUED | OUTPATIENT
Start: 2020-05-28 | End: 2020-05-28

## 2020-05-28 RX ORDER — SODIUM CHLORIDE 9 MG/ML
1000 INJECTION, SOLUTION INTRAVENOUS
Refills: 0 | Status: DISCONTINUED | OUTPATIENT
Start: 2020-05-28 | End: 2020-05-29

## 2020-05-28 RX ORDER — VALPROIC ACID (AS SODIUM SALT) 250 MG/5ML
250 SOLUTION, ORAL ORAL DAILY
Refills: 0 | Status: DISCONTINUED | OUTPATIENT
Start: 2020-05-28 | End: 2020-05-28

## 2020-05-28 RX ORDER — SIMVASTATIN 20 MG/1
40 TABLET, FILM COATED ORAL AT BEDTIME
Refills: 0 | Status: DISCONTINUED | OUTPATIENT
Start: 2020-05-28 | End: 2020-05-30

## 2020-05-28 RX ORDER — VALPROIC ACID (AS SODIUM SALT) 250 MG/5ML
250 SOLUTION, ORAL ORAL DAILY
Refills: 0 | Status: DISCONTINUED | OUTPATIENT
Start: 2020-05-28 | End: 2020-05-29

## 2020-05-28 RX ORDER — HALOPERIDOL DECANOATE 100 MG/ML
0.5 INJECTION INTRAMUSCULAR ONCE
Refills: 0 | Status: DISCONTINUED | OUTPATIENT
Start: 2020-05-28 | End: 2020-05-30

## 2020-05-28 RX ORDER — DIGOXIN 250 MCG
0.1 TABLET ORAL DAILY
Refills: 0 | Status: DISCONTINUED | OUTPATIENT
Start: 2020-05-28 | End: 2020-05-29

## 2020-05-28 RX ORDER — TRAZODONE HCL 50 MG
50 TABLET ORAL AT BEDTIME
Refills: 0 | Status: DISCONTINUED | OUTPATIENT
Start: 2020-05-28 | End: 2020-05-30

## 2020-05-28 RX ORDER — GABAPENTIN 400 MG/1
100 CAPSULE ORAL THREE TIMES A DAY
Refills: 0 | Status: DISCONTINUED | OUTPATIENT
Start: 2020-05-28 | End: 2020-05-30

## 2020-05-28 RX ADMIN — SODIUM CHLORIDE 50 MILLILITER(S): 9 INJECTION, SOLUTION INTRAVENOUS at 11:54

## 2020-05-28 RX ADMIN — Medication 0.1 MILLIGRAM(S): at 12:51

## 2020-05-28 RX ADMIN — Medication 25 MILLIGRAM(S): at 13:31

## 2020-05-28 NOTE — H&P ADULT - PROBLEM SELECTOR PLAN 4
on eliquis, temp held for anticipated endoscopic PEG placement, no further vte ppx required at this time.

## 2020-05-28 NOTE — H&P ADULT - NSHPPHYSICALEXAM_GEN_ALL_CORE
PHYSICAL EXAM:    Vital Signs Last 24 Hrs  T(C): 36.4 (27 May 2020 22:39), Max: 36.7 (27 May 2020 18:36)  T(F): 97.6 (27 May 2020 22:39), Max: 98.1 (27 May 2020 18:36)  HR: 71 (27 May 2020 22:39) (71 - 76)  BP: 155/71 (27 May 2020 22:39) (147/84 - 155/71)  BP(mean): --  RR: 17 (27 May 2020 22:39) (17 - 20)  SpO2: 98% (27 May 2020 22:39) (93% - 98%)    GENERAL: NAD, well-groomed, well-developed  HEAD:  Atraumatic, Normocephalic  EYES: EOMI, PERRLA, conjunctiva and sclera clear  ENMT: No tonsillar erythema, exudates, or enlargement; Moist mucous membranes, Good dentition, No lesions  NECK: Supple, No JVD, Normal thyroid  CHEST/LUNG: Clear to percussion bilaterally; No rales, rhonchi, wheezing, or rubs  HEART: Regular rate and rhythm; No murmurs, rubs, or gallops  ABDOMEN: Soft, Nontender, Nondistended; Bowel sounds present  EXTREMITIES:  2+ Peripheral Pulses, No clubbing, cyanosis, or edema  LYMPH: No lymphadenopathy noted  SKIN: No rashes or lesions  NERVOUS SYSTEM:  Alert & Oriented X3, Good concentration; Motor Strength 5/5 B/L upper and lower extremities; DTRs 2+ intact and symmetric PHYSICAL EXAM:    Vital Signs Last 24 Hrs  T(C): 36.4 (27 May 2020 22:39), Max: 36.7 (27 May 2020 18:36)  T(F): 97.6 (27 May 2020 22:39), Max: 98.1 (27 May 2020 18:36)  HR: 71 (27 May 2020 22:39) (71 - 76)  BP: 155/71 (27 May 2020 22:39) (147/84 - 155/71)  BP(mean): --  RR: 17 (27 May 2020 22:39) (17 - 20)  SpO2: 98% (27 May 2020 22:39) (93% - 98%)    GENERAL: NAD, thin. confused and gets aggressive if attempt at examining belly  HEAD:  Atraumatic, Normocephalic  EYES: no gross obvious deformity, patient getting aggressive if attempt made at examining eyes at distance closer than few feet away.   ENMT: poor dentition. MM appear normal.   NECK: Supple, No JVD  CHEST/LUNG: Clear to percussion bilaterally; No rales, rhonchi, wheezing, or rubs no resp distress or accessory muscle usage.   HEART: Regular rate and rhythm; No murmurs, rubs, or gallops no sig Le edema.   ABDOMEN: limited exam as patient is hitting/batting away examiner when tryign to examine belly. appears soft but unable to get detailed exam on TTP or prior PEG site.   EXTREMITIES:  2+ Peripheral Pulses, No clubbing, cyanosis, or edema  LYMPH: No lymphadenopathy noted, no lymphangitis  SKIN: No rashes or lesions, no petechiae  NERVOUS SYSTEM:  Alert & Oriented X0, poor concentration; unable to participate in full neuro exam, pt is nonverbal and not following commands but is spontaneously moving all 4 extr.

## 2020-05-28 NOTE — DISCHARGE NOTE PROVIDER - NSDCCPCAREPLAN_GEN_ALL_CORE_FT
PRINCIPAL DISCHARGE DIAGNOSIS  Diagnosis: PEG (percutaneous endoscopic gastrostomy) adjustment/replacement/removal  Assessment and Plan of Treatment: PRINCIPAL DISCHARGE DIAGNOSIS  Diagnosis: PEG (percutaneous endoscopic gastrostomy) adjustment/replacement/removal  Assessment and Plan of Treatment: seen by gastroeneterologist for peg replacement PRINCIPAL DISCHARGE DIAGNOSIS  Diagnosis: PEG (percutaneous endoscopic gastrostomy) adjustment/replacement/removal  Assessment and Plan of Treatment: seen by gastroeneterologist for peg replacement, resumed peg feed and tolerated by patient well      SECONDARY DISCHARGE DIAGNOSES  Diagnosis: Transaminitis  Assessment and Plan of Treatment: f/u with PCP in 1-2 weeks for a repeat liver function test and outpatient f/u with abdominal ultrasound or  CT scan    Diagnosis: Chronic atrial fibrillation  Assessment and Plan of Treatment: continue prescribed medications and f/u with PCP in 1-2 weeks

## 2020-05-28 NOTE — PHYSICAL THERAPY INITIAL EVALUATION ADULT - CRITERIA FOR SKILLED THERAPEUTIC INTERVENTIONS
impairments found/predicted duration of therapy intervention/therapy frequency/anticipated equipment needs at discharge/anticipated discharge recommendation/functional limitations in following categories/rehab potential/risk reduction/prevention

## 2020-05-28 NOTE — DISCHARGE NOTE PROVIDER - CARE PROVIDER_API CALL
BYRON DAWN  Internal Medicine  [address] [place] [address]  Phone: [phone]  Fax: [phone]  Follow Up Time:

## 2020-05-28 NOTE — PROGRESS NOTE ADULT - PROBLEM SELECTOR PLAN 3
Mild transaminitis. AST noted to be elevated on past lab  Possibly related to vpa verse   - Trend cmp daily  - Will send hep panel with AM labs

## 2020-05-28 NOTE — PHYSICAL THERAPY INITIAL EVALUATION ADULT - ACTIVE RANGE OF MOTION EXAMINATION, REHAB EVAL
Left LE Active ROM was WFL (within functional limits)/Left UE Active ROM was WFL (within functional limits)/RUE and RLE limited 2/2 stroke approx 2-/5

## 2020-05-28 NOTE — PHYSICAL THERAPY INITIAL EVALUATION ADULT - PERTINENT HX OF CURRENT PROBLEM, REHAB EVAL
83 F PMH CVA with residual R sided weakness, dementia c/b dysphagia s/p PEG, nonverbal at baseline, chronic atrial fibrillation on Eliquis, p/w dislodged PEG tube.  Pt unable to provide hx 2/2 nonverbal status. Hx obtained from ER documentation/chart. It appears PEG tube dislodged today >6 hrs.  Last admitted 5/2019 with similar presentation,, Dx: Displaced PEG tube -

## 2020-05-28 NOTE — PHYSICAL THERAPY INITIAL EVALUATION ADULT - ADDITIONAL COMMENTS
PTA pt required assistance with all functional mobility and all ADL's provided by family. owns all DME neseccary including XU

## 2020-05-28 NOTE — PHYSICAL THERAPY INITIAL EVALUATION ADULT - IMPAIRMENTS FOUND, PT EVAL
aerobic capacity/endurance/gait, locomotion, and balance/joint integrity and mobility/muscle strength

## 2020-05-28 NOTE — H&P ADULT - NSHPSOCIALHISTORY_GEN_ALL_CORE
Social History:    Marital Status:  (   )    (   ) Single    (   )    (  )   Occupation:   Lives with: (  ) alone  (  ) children   (  ) spouse   (  ) parents  (  ) other    Substance Use (street drugs): (  ) never used  (  ) other:  Tobacco Usage:  (   ) never smoked   (   ) former smoker   (   ) current smoker  (     ) pack year  (        ) last cigarette date  Alcohol Usage:  Sexual History: unable to verify with patient as she is nonverbal at baseline.  call placed to family unanswered overnight.

## 2020-05-28 NOTE — H&P ADULT - NSHPLABSRESULTS_GEN_ALL_CORE
Labs personally reviewed : cbc unrevealing, INR 1.19, cmp with mild transaminitis otherwise unrevealing.    CXR pending  EKG personally reviewed

## 2020-05-28 NOTE — DISCHARGE NOTE PROVIDER - HOSPITAL COURSE
83 F PMH CVA with residual R sided weakness, dementia c/b dysphagia s/p PEG, nonverbal at baseline, chronic atrial fibrillation on Eliquis, p/w dislodged PEG tube. 83 F PMH CVA with residual R sided weakness, dementia c/b dysphagia s/p PEG, nonverbal at baseline, chronic atrial fibrillation on Eliquis, p/w dislodged PEG tube.      seen by GI and for peg tube replacement 83 F PMH CVA with residual R sided weakness, dementia c/b dysphagia s/p PEG, nonverbal at baseline, chronic atrial fibrillation on Eliquis, p/w dislodged PEG tube.      seen by GI and peg tube replaced with tube feeds resumed and tolerated by patient well 83 F PMH CVA with residual R sided weakness, dementia c/b dysphagia s/p PEG, nonverbal at baseline, chronic atrial fibrillation on Eliquis, p/w dislodged PEG tube.      seen by GI and peg tube was replaced with confirmed placement via X-ray. Tube feeds resumed and tolerated by patient well. During hospital stay Ms. Garza's liver function was elevated and returned to baseline at d/c. the patient  is advised to f/u with PCP in 1-2 weeks and to have a repeat LFT with possible abdominal US or CT of abdomen.

## 2020-05-28 NOTE — CONSULT NOTE ADULT - ASSESSMENT
Impression:    #PEG dislodgement:   #Chronic Afib on Eliquis: Last dose taken 5/27/20  #History of CVA with right-sided deficits     Recommendations: Impression:    #PEG dislodgement: Prior history of PEG dislodgement in 05/2018 and 05/2019. Unable to pass catheter/low-profile PEG through stoma at bedside.   #Chronic Afib on Eliquis: Last dose taken 5/27/20  #History of CVA with right-sided deficits     Recommendations:  - hold apixaban   - please place NGT so that oral medications i.e. digoxin can be administered or give parenteral form if available   - plan for upper endoscopy for PEG replacement tomorrow; please make sure vital signs are stable, IN5 <1.5, electrolytes repleted    Jaleesa Post PGY-4  Gastroenterology Fellow  Pager #[phone]

## 2020-05-28 NOTE — ED ADULT NURSE REASSESSMENT NOTE - NS ED NURSE REASSESS COMMENT FT1
Pt cleaned and changed, admitted Medicine, RTM awaiting bed pending COVID results. 1:1 in place for safety.

## 2020-05-28 NOTE — CONSULT NOTE ADULT - SUBJECTIVE AND OBJECTIVE BOX
Chief Complaint:  Patient is a 84y old  Female who presents with a chief complaint of PEG tube dislodged (28 May 2020 08:22)      HPI:    84 year old woman with history of CVA with residual R sided weakness, dementia c/b dysphagia s/p PEG, nonverbal, chronic Afib on Eliquis admitted for dislodged PEG. History was obtained upon chart review and granddaughter Eve ([phone]). PEG tube was dislodged approx. 6 hrs prior to presentation. Of note, patient has a history of pulling out her PEG as evidenced by prior admission in May 2019 for endoscopic PEG replacement. PEG replacement was attempted in ED however tube could not be advanced into stomach. Per granddaughter, patient last took Eliquis in the morning 5/27/20.       Allergies:  No Known Allergies      Home Medications:    · 	acetaminophen 325 mg oral tablet: Last Dose Taken:  , 2 tab(s) orally every 6 hours, As needed, Mild Pain (1 - 3)  · 	Ativan 1 mg oral tablet: Last Dose Taken:  , 1 tab(s) orally once a day (at bedtime)  · 	traZODone 50 mg oral tablet: Last Dose Taken:  , 1 tab(s) orally once a day (at bedtime)  · 	valproic acid 250 mg/5 mL oral syrup: Last Dose Taken:  , 5 milliliter(s) orally once a day  · 	digoxin 125 mcg (0.125 mg) oral tablet: Last Dose Taken:  , 1 tab(s) orally once a day  · 	Zocor 40 mg oral tablet: Last Dose Taken:  , 1 tab(s) orally once a day (at bedtime)  · 	Eliquis 5 mg oral tablet: Last Dose Taken:  , 1 tab(s) orally 2 times a day  · 	gabapentin 100 mg oral capsule: Last Dose Taken:  , 1 cap(s) orally 3 times a day    Hospital Medications:  dextrose 5% + sodium chloride 0.45%. 1000 milliLiter(s) IV Continuous <Continuous>  digoxin  Injectable 0.1 milliGRAM(s) IV Push daily  gabapentin 100 milliGRAM(s) Oral three times a day  haloperidol    Injectable 0.5 milliGRAM(s) IV Push once  simvastatin 40 milliGRAM(s) Oral at bedtime  traZODone 50 milliGRAM(s) Oral at bedtime  valproate sodium IVPB 250 milliGRAM(s) IV Intermittent daily      PMHX/PSHX:  PEG tube malfunction  CVA (cerebral vascular accident)  No significant past surgical history      Family history:  No pertinent family history in first degree relatives      Social History:     ROS: Unable to obtain as patient is non-verbal.       PHYSICAL EXAM:     GENERAL:  Resting comfortably, no distress, non-verbal   HEENT:  Conjunctivae clear and pink,  no JVD  CHEST:  Full & symmetric excursion, no increased effort   HEART:  Regular rhythm & rate   ABDOMEN:  Soft, non-tender, non-distended   EXTREMITIES:  no LE edema  SKIN:  No rash/erythema/ecchymoses +2 healed surgical incisions from prior PEG +left abdominal stoma   NEURO:  Unable to assess mental status as patient is non-verbal +right side hemiparesis     Vital Signs:  Vital Signs Last 24 Hrs  T(C): 36.6 (28 May 2020 11:52), Max: 36.7 (27 May 2020 18:36)  T(F): 97.9 (28 May 2020 11:52), Max: 98.1 (27 May 2020 18:36)  HR: 72 (28 May 2020 13:38) (68 - 76)  BP: 127/73 (28 May 2020 13:38) (127/73 - 155/71)  BP(mean): --  RR: 18 (28 May 2020 13:38) (17 - 20)  SpO2: 98% (28 May 2020 13:38) (93% - 98%)  Daily Height in cm: 152.4 (27 May 2020 18:36)    Daily     LABS:                        14.3   7.82  )-----------( 194      ( 27 May 2020 23:20 )             42.5     05-27    141  |  105  |  23  ----------------------------<  97  4.7   |  26  |  0.49<L>    Ca    8.8      27 May 2020 23:20    TPro  6.7  /  Alb  3.4  /  TBili  1.2  /  DBili  x   /  AST  84<H>  /  ALT  63<H>  /  AlkPhos  80  05-27    LIVER FUNCTIONS - ( 27 May 2020 23:20 )  Alb: 3.4 g/dL / Pro: 6.7 g/dL / ALK PHOS: 80 U/L / ALT: 63 U/L / AST: 84 U/L / GGT: x           PT/INR - ( 27 May 2020 23:19 )   PT: 13.6 sec;   INR: 1.19 ratio    PTT - ( 27 May 2020 23:19 )  PTT:33.5 sec      Imaging:

## 2020-05-28 NOTE — DISCHARGE NOTE PROVIDER - NSDCMRMEDTOKEN_GEN_ALL_CORE_FT
acetaminophen 325 mg oral tablet: 2 tab(s) orally every 6 hours, As needed, Mild Pain (1 - 3)  Ativan 1 mg oral tablet: 1 tab(s) orally once a day (at bedtime)  digoxin 125 mcg (0.125 mg) oral tablet: 1 tab(s) orally once a day  Eliquis 5 mg oral tablet: 1 tab(s) orally 2 times a day  gabapentin 100 mg oral capsule: 1 cap(s) orally 3 times a day  traZODone 50 mg oral tablet: 1 tab(s) orally once a day (at bedtime)  valproic acid 250 mg/5 mL oral syrup: 5 milliliter(s) orally once a day  Zocor 40 mg oral tablet: 1 tab(s) orally once a day (at bedtime) acetaminophen 325 mg oral tablet: 2 tab(s) orally every 6 hours, As needed, Mild Pain (1 - 3)  Ativan 1 mg oral tablet: 1 tab(s) orally once a day (at bedtime)  digoxin 125 mcg (0.125 mg) oral tablet: 1 tab(s) orally once a day  Eliquis 5 mg oral tablet: 1 tab(s) orally 2 times a day  gabapentin 100 mg oral capsule: 1 cap(s) orally 3 times a day  traZODone 50 mg oral tablet: 1 tab(s) orally once a day (at bedtime)  valproic acid 250 mg/5 mL oral syrup: 5 milliliter(s) orally once a day

## 2020-05-28 NOTE — CONSULT NOTE ADULT - ATTENDING COMMENTS
s/p peg dislodgement,    plan hold anticoagulation           peg placement tomorrow if patient clinically stable.

## 2020-05-28 NOTE — H&P ADULT - PROBLEM SELECTOR PLAN 1
-admit to medicine  -Will email GI for consult for endoscopic PEG tube replacement  -NPO except meds until PEG tube  -asp prec

## 2020-05-28 NOTE — H&P ADULT - ASSESSMENT
83 F PMH CVA with residual R sided weakness, dementia c/b dysphagia s/p PEG, nonverbal at baseline, chronic atrial fibrillation on Eliquis, p/w dislodged PEG tube.

## 2020-05-28 NOTE — H&P ADULT - PROBLEM SELECTOR PLAN 3
-mild transaminitis  -trend cmp, if uptrending consider further workup. Tb noted normal. Possibly related to vpa

## 2020-05-28 NOTE — CHART NOTE - NSCHARTNOTEFT_GEN_A_CORE
Medicine PA Note     Notified by RN that patient was agitated and removed her IV access.   She has hx of dementia. She is dysphagic and is nonverbal. She is restless and unable to follow commands.  EKG on 5/19 shows NSR with HR 76 and  ms.   Ordered Haldol 0.5 mg for agitation. Discussed with hospitalist.   C/w constant observation.  Will endorse to day team.       Naveen HERNANDEZ   [id number]

## 2020-05-28 NOTE — PROGRESS NOTE ADULT - PROBLEM SELECTOR PLAN 4
DVT: holding Eliquis, will start SQH while awaiting procedure as largely bed bound and high risk for DVT

## 2020-05-28 NOTE — PHYSICAL THERAPY INITIAL EVALUATION ADULT - RANGE OF MOTION EXAMINATION, REHAB EVAL
Left UE ROM was WFL (within functional limits)/Left LE ROM was WFL (within functional limits)/RUE typical hemiplegia posture and RLE limited lacking last 20 degees knee extension.

## 2020-05-28 NOTE — PROGRESS NOTE ADULT - PROBLEM SELECTOR PLAN 5
Transitions of Care Status:  1.  Name of PCP: Greg Tam  2.  PCP Contacted on Admission: [ ] Y    [x] N    3.  PCP contacted at Discharge: [ ] Y    [ ] N    [ ] N/A  4.  Post-Discharge Appointment Date and Location:  5.  Summary of Handoff given to PCP:

## 2020-05-29 DIAGNOSIS — E87.5 HYPERKALEMIA: ICD-10-CM

## 2020-05-29 LAB
ALBUMIN SERPL ELPH-MCNC: 3.6 G/DL — SIGNIFICANT CHANGE UP (ref 3.3–5)
ALP SERPL-CCNC: 87 U/L — SIGNIFICANT CHANGE UP (ref 40–120)
ALT FLD-CCNC: 92 U/L — HIGH (ref 10–45)
ANION GAP SERPL CALC-SCNC: 11 MMOL/L — SIGNIFICANT CHANGE UP (ref 5–17)
ANION GAP SERPL CALC-SCNC: 13 MMOL/L — SIGNIFICANT CHANGE UP (ref 5–17)
APTT BLD: 32.3 SEC — SIGNIFICANT CHANGE UP (ref 27.5–36.3)
AST SERPL-CCNC: 154 U/L — HIGH (ref 10–40)
BILIRUB SERPL-MCNC: 1.9 MG/DL — HIGH (ref 0.2–1.2)
BUN SERPL-MCNC: 13 MG/DL — SIGNIFICANT CHANGE UP (ref 7–23)
BUN SERPL-MCNC: 15 MG/DL — SIGNIFICANT CHANGE UP (ref 7–23)
CALCIUM SERPL-MCNC: 8.8 MG/DL — SIGNIFICANT CHANGE UP (ref 8.4–10.5)
CALCIUM SERPL-MCNC: 9.1 MG/DL — SIGNIFICANT CHANGE UP (ref 8.4–10.5)
CHLORIDE SERPL-SCNC: 100 MMOL/L — SIGNIFICANT CHANGE UP (ref 96–108)
CHLORIDE SERPL-SCNC: 103 MMOL/L — SIGNIFICANT CHANGE UP (ref 96–108)
CO2 SERPL-SCNC: 24 MMOL/L — SIGNIFICANT CHANGE UP (ref 22–31)
CO2 SERPL-SCNC: 24 MMOL/L — SIGNIFICANT CHANGE UP (ref 22–31)
CREAT SERPL-MCNC: 0.45 MG/DL — LOW (ref 0.5–1.3)
CREAT SERPL-MCNC: 0.53 MG/DL — SIGNIFICANT CHANGE UP (ref 0.5–1.3)
GLUCOSE SERPL-MCNC: 105 MG/DL — HIGH (ref 70–99)
GLUCOSE SERPL-MCNC: 121 MG/DL — HIGH (ref 70–99)
HAV IGM SER-ACNC: SIGNIFICANT CHANGE UP
HBV CORE IGM SER-ACNC: SIGNIFICANT CHANGE UP
HBV SURFACE AG SER-ACNC: SIGNIFICANT CHANGE UP
HCT VFR BLD CALC: 46.6 % — HIGH (ref 34.5–45)
HCV AB S/CO SERPL IA: 0.24 S/CO — SIGNIFICANT CHANGE UP (ref 0–0.99)
HCV AB SERPL-IMP: SIGNIFICANT CHANGE UP
HGB BLD-MCNC: 15.5 G/DL — SIGNIFICANT CHANGE UP (ref 11.5–15.5)
INR BLD: 1.07 RATIO — SIGNIFICANT CHANGE UP (ref 0.88–1.16)
MCHC RBC-ENTMCNC: 33 PG — SIGNIFICANT CHANGE UP (ref 27–34)
MCHC RBC-ENTMCNC: 33.3 GM/DL — SIGNIFICANT CHANGE UP (ref 32–36)
MCV RBC AUTO: 99.1 FL — SIGNIFICANT CHANGE UP (ref 80–100)
NRBC # BLD: 0 /100 WBCS — SIGNIFICANT CHANGE UP (ref 0–0)
PLATELET # BLD AUTO: 219 K/UL — SIGNIFICANT CHANGE UP (ref 150–400)
POTASSIUM SERPL-MCNC: 4.6 MMOL/L — SIGNIFICANT CHANGE UP (ref 3.5–5.3)
POTASSIUM SERPL-MCNC: 5.4 MMOL/L — HIGH (ref 3.5–5.3)
POTASSIUM SERPL-SCNC: 4.6 MMOL/L — SIGNIFICANT CHANGE UP (ref 3.5–5.3)
POTASSIUM SERPL-SCNC: 5.4 MMOL/L — HIGH (ref 3.5–5.3)
PROT SERPL-MCNC: 6.9 G/DL — SIGNIFICANT CHANGE UP (ref 6–8.3)
PROTHROM AB SERPL-ACNC: 12.1 SEC — SIGNIFICANT CHANGE UP (ref 10–13.1)
RBC # BLD: 4.7 M/UL — SIGNIFICANT CHANGE UP (ref 3.8–5.2)
RBC # FLD: 13.6 % — SIGNIFICANT CHANGE UP (ref 10.3–14.5)
SODIUM SERPL-SCNC: 135 MMOL/L — SIGNIFICANT CHANGE UP (ref 135–145)
SODIUM SERPL-SCNC: 140 MMOL/L — SIGNIFICANT CHANGE UP (ref 135–145)
WBC # BLD: 7.15 K/UL — SIGNIFICANT CHANGE UP (ref 3.8–10.5)
WBC # FLD AUTO: 7.15 K/UL — SIGNIFICANT CHANGE UP (ref 3.8–10.5)

## 2020-05-29 PROCEDURE — 99233 SBSQ HOSP IP/OBS HIGH 50: CPT

## 2020-05-29 PROCEDURE — 93010 ELECTROCARDIOGRAM REPORT: CPT

## 2020-05-29 PROCEDURE — 43246 EGD PLACE GASTROSTOMY TUBE: CPT | Mod: GC

## 2020-05-29 RX ORDER — DIGOXIN 250 MCG
0.12 TABLET ORAL DAILY
Refills: 0 | Status: DISCONTINUED | OUTPATIENT
Start: 2020-05-30 | End: 2020-05-30

## 2020-05-29 RX ORDER — SODIUM ZIRCONIUM CYCLOSILICATE 10 G/10G
10 POWDER, FOR SUSPENSION ORAL ONCE
Refills: 0 | Status: DISCONTINUED | OUTPATIENT
Start: 2020-05-29 | End: 2020-05-29

## 2020-05-29 RX ORDER — APIXABAN 2.5 MG/1
2.5 TABLET, FILM COATED ORAL EVERY 12 HOURS
Refills: 0 | Status: DISCONTINUED | OUTPATIENT
Start: 2020-05-29 | End: 2020-05-30

## 2020-05-29 RX ORDER — VALPROIC ACID (AS SODIUM SALT) 250 MG/5ML
250 SOLUTION, ORAL ORAL DAILY
Refills: 0 | Status: DISCONTINUED | OUTPATIENT
Start: 2020-05-29 | End: 2020-05-30

## 2020-05-29 RX ADMIN — GABAPENTIN 100 MILLIGRAM(S): 400 CAPSULE ORAL at 22:11

## 2020-05-29 RX ADMIN — Medication 50 MILLIGRAM(S): at 22:11

## 2020-05-29 RX ADMIN — SIMVASTATIN 40 MILLIGRAM(S): 20 TABLET, FILM COATED ORAL at 22:11

## 2020-05-29 RX ADMIN — Medication 25 MILLIGRAM(S): at 12:21

## 2020-05-29 RX ADMIN — SODIUM CHLORIDE 50 MILLILITER(S): 9 INJECTION, SOLUTION INTRAVENOUS at 04:38

## 2020-05-29 RX ADMIN — APIXABAN 2.5 MILLIGRAM(S): 2.5 TABLET, FILM COATED ORAL at 18:16

## 2020-05-29 RX ADMIN — Medication 0.1 MILLIGRAM(S): at 12:20

## 2020-05-29 NOTE — PROGRESS NOTE ADULT - PROBLEM SELECTOR PLAN 2
- c/w digoxin  - Resume eliquis, safe per GI (no new incision made) Noted  - Obtain ECG, repeat BMP PM  - Continue to trend daily labs

## 2020-05-29 NOTE — PROGRESS NOTE ADULT - PROBLEM SELECTOR PLAN 4
DVT: Resume Eliquis Rising transaminitis, Bili rising as well (mild- 1.9).   AST noted to be elevated on past lab  Possibly related to vpa   - Trend cmp daily  - Hep panel sent, will follow up Rising transaminitis, Bili rising as well (mild- 1.9).   AST noted to be elevated on past lab however, this is significantly higher  Possibly related to vpa   - Trend cmp daily  - Hep panel sent, will follow up  - Will hold off on RUQ right now as she has the binder on, and is uncomfortable post-procedure. Will order if still rising LFT's tomorrow.

## 2020-05-29 NOTE — PROGRESS NOTE ADULT - PROBLEM SELECTOR PLAN 6
Transitions of Care Status:  1.  Name of PCP: Greg Tam  2.  PCP Contacted on Admission: [ ] Y    [x] N    3.  PCP contacted at Discharge: [ ] Y    [ ] N    [ ] N/A **Attempted to call 5/29, no answer at office  4.  Post-Discharge Appointment Date and Location:  5.  Summary of Handoff given to PCP:

## 2020-05-29 NOTE — PROGRESS NOTE ADULT - PROBLEM SELECTOR PLAN 3
Rising transaminitis, Bili rising as well (mild- 1.9).   AST noted to be elevated on past lab  Possibly related to vpa   - Trend cmp daily  - Hep panel sent, will follow up - c/w digoxin  - Resume eliquis, safe per GI (no new incision made) - c/w digoxin  - Resume eliquis, safe per GI (no new incision made). Will resume at 2.5 BID (based on age, and Wt 45kg)

## 2020-05-29 NOTE — PROGRESS NOTE ADULT - PROBLEM SELECTOR PLAN 5
Transitions of Care Status:  1.  Name of PCP: Greg Tam  2.  PCP Contacted on Admission: [ ] Y    [x] N    3.  PCP contacted at Discharge: [ ] Y    [ ] N    [ ] N/A **Attempted to call 5/29, no answer at office  4.  Post-Discharge Appointment Date and Location:  5.  Summary of Handoff given to PCP: DVT: Resume Eliquis

## 2020-05-30 ENCOUNTER — TRANSCRIPTION ENCOUNTER (OUTPATIENT)
Age: 85
End: 2020-05-30

## 2020-05-30 VITALS
SYSTOLIC BLOOD PRESSURE: 145 MMHG | DIASTOLIC BLOOD PRESSURE: 83 MMHG | TEMPERATURE: 98 F | OXYGEN SATURATION: 97 % | HEART RATE: 72 BPM | RESPIRATION RATE: 20 BRPM

## 2020-05-30 LAB
ALBUMIN SERPL ELPH-MCNC: 2.9 G/DL — LOW (ref 3.3–5)
ALP SERPL-CCNC: 85 U/L — SIGNIFICANT CHANGE UP (ref 40–120)
ALT FLD-CCNC: 71 U/L — HIGH (ref 10–45)
ANION GAP SERPL CALC-SCNC: 11 MMOL/L — SIGNIFICANT CHANGE UP (ref 5–17)
AST SERPL-CCNC: 92 U/L — HIGH (ref 10–40)
BILIRUB SERPL-MCNC: 1.1 MG/DL — SIGNIFICANT CHANGE UP (ref 0.2–1.2)
BUN SERPL-MCNC: 13 MG/DL — SIGNIFICANT CHANGE UP (ref 7–23)
CALCIUM SERPL-MCNC: 8.5 MG/DL — SIGNIFICANT CHANGE UP (ref 8.4–10.5)
CHLORIDE SERPL-SCNC: 99 MMOL/L — SIGNIFICANT CHANGE UP (ref 96–108)
CO2 SERPL-SCNC: 24 MMOL/L — SIGNIFICANT CHANGE UP (ref 22–31)
CREAT SERPL-MCNC: 0.45 MG/DL — LOW (ref 0.5–1.3)
GLUCOSE SERPL-MCNC: 164 MG/DL — HIGH (ref 70–99)
HCT VFR BLD CALC: 42.8 % — SIGNIFICANT CHANGE UP (ref 34.5–45)
HGB BLD-MCNC: 13.9 G/DL — SIGNIFICANT CHANGE UP (ref 11.5–15.5)
MCHC RBC-ENTMCNC: 31.8 PG — SIGNIFICANT CHANGE UP (ref 27–34)
MCHC RBC-ENTMCNC: 32.5 GM/DL — SIGNIFICANT CHANGE UP (ref 32–36)
MCV RBC AUTO: 97.9 FL — SIGNIFICANT CHANGE UP (ref 80–100)
NRBC # BLD: 0 /100 WBCS — SIGNIFICANT CHANGE UP (ref 0–0)
PLATELET # BLD AUTO: 186 K/UL — SIGNIFICANT CHANGE UP (ref 150–400)
POTASSIUM SERPL-MCNC: 3.9 MMOL/L — SIGNIFICANT CHANGE UP (ref 3.5–5.3)
POTASSIUM SERPL-SCNC: 3.9 MMOL/L — SIGNIFICANT CHANGE UP (ref 3.5–5.3)
PROT SERPL-MCNC: 6.1 G/DL — SIGNIFICANT CHANGE UP (ref 6–8.3)
RBC # BLD: 4.37 M/UL — SIGNIFICANT CHANGE UP (ref 3.8–5.2)
RBC # FLD: 13.5 % — SIGNIFICANT CHANGE UP (ref 10.3–14.5)
SODIUM SERPL-SCNC: 134 MMOL/L — LOW (ref 135–145)
WBC # BLD: 7.14 K/UL — SIGNIFICANT CHANGE UP (ref 3.8–10.5)
WBC # FLD AUTO: 7.14 K/UL — SIGNIFICANT CHANGE UP (ref 3.8–10.5)

## 2020-05-30 PROCEDURE — 93005 ELECTROCARDIOGRAM TRACING: CPT

## 2020-05-30 PROCEDURE — 80048 BASIC METABOLIC PNL TOTAL CA: CPT

## 2020-05-30 PROCEDURE — 97163 PT EVAL HIGH COMPLEX 45 MIN: CPT

## 2020-05-30 PROCEDURE — 36415 COLL VENOUS BLD VENIPUNCTURE: CPT

## 2020-05-30 PROCEDURE — 86850 RBC ANTIBODY SCREEN: CPT

## 2020-05-30 PROCEDURE — 86901 BLOOD TYPING SEROLOGIC RH(D): CPT

## 2020-05-30 PROCEDURE — 85730 THROMBOPLASTIN TIME PARTIAL: CPT

## 2020-05-30 PROCEDURE — L8699: CPT

## 2020-05-30 PROCEDURE — 85610 PROTHROMBIN TIME: CPT

## 2020-05-30 PROCEDURE — 99232 SBSQ HOSP IP/OBS MODERATE 35: CPT

## 2020-05-30 PROCEDURE — 86900 BLOOD TYPING SEROLOGIC ABO: CPT

## 2020-05-30 PROCEDURE — 99285 EMERGENCY DEPT VISIT HI MDM: CPT

## 2020-05-30 PROCEDURE — 80053 COMPREHEN METABOLIC PANEL: CPT

## 2020-05-30 PROCEDURE — 85027 COMPLETE CBC AUTOMATED: CPT

## 2020-05-30 PROCEDURE — 80074 ACUTE HEPATITIS PANEL: CPT

## 2020-05-30 PROCEDURE — 71045 X-RAY EXAM CHEST 1 VIEW: CPT

## 2020-05-30 RX ORDER — SIMVASTATIN 20 MG/1
1 TABLET, FILM COATED ORAL
Qty: 0 | Refills: 0 | DISCHARGE

## 2020-05-30 RX ADMIN — GABAPENTIN 100 MILLIGRAM(S): 400 CAPSULE ORAL at 14:00

## 2020-05-30 RX ADMIN — APIXABAN 2.5 MILLIGRAM(S): 2.5 TABLET, FILM COATED ORAL at 05:06

## 2020-05-30 RX ADMIN — Medication 0.12 MILLIGRAM(S): at 05:06

## 2020-05-30 RX ADMIN — GABAPENTIN 100 MILLIGRAM(S): 400 CAPSULE ORAL at 05:06

## 2020-05-30 RX ADMIN — Medication 250 MILLIGRAM(S): at 11:24

## 2020-05-30 NOTE — PROGRESS NOTE ADULT - PROBLEM SELECTOR PLAN 3
LFTs now downtrending, and hepatitis panel negative  - Will monitor with outtpt follow up, and if any further uptrend, would consider us abd or CT abd/pelvis

## 2020-05-30 NOTE — DIETITIAN INITIAL EVALUATION ADULT. - ENTERAL
1. Continue current regimen of Jevity 1.2, 237cc q6hrs to provide total volume 948mL, 1138kcals, 53g protein, 765mL free fluid daily; discussion with NP that pt will return to home regimen of Osmolite 1.2 upon discharge

## 2020-05-30 NOTE — PROGRESS NOTE ADULT - PROBLEM SELECTOR PLAN 2
- c/w digoxin  - Resume eliquis, safe per GI (no new incision made). Will resume at 2.5 BID (based on age, and Wt 45kg)

## 2020-05-30 NOTE — DIETITIAN INITIAL EVALUATION ADULT. - PHYSICAL APPEARANCE
other (specify) Ht: 5'0" Wt: 100 pounds  BMI: 19.5 kg/m2 IBW: 100 pounds (+/-10%) 100%IBW  Edema: none noted  Skin per nursing flowsheets: no pressure injuries noted

## 2020-05-30 NOTE — DIETITIAN INITIAL EVALUATION ADULT. - PERTINENT LABORATORY DATA
05-30 Na134 mmol/L<L> Glu 164 mg/dL<H> K+ 3.9 mmol/L Cr  0.45 mg/dL<L> BUN 13 mg/dL Phos n/a   Alb 2.9 g/dL<L> PAB n/a

## 2020-05-30 NOTE — PROGRESS NOTE ADULT - SUBJECTIVE AND OBJECTIVE BOX
[facility] Division of Hospital Medicine  Marielena Storm MD  Pager (M-F, 6T-5P): [phone]  Other Times:  [phone]    Patient is a 84y old  Female who presents with a chief complaint of PEG tube dislodged (28 May 2020 15:03)      SUBJECTIVE / OVERNIGHT EVENTS: No acute events overall. History is limited due to mental status, patietn is non verbal  ADDITIONAL REVIEW OF SYSTEMS: History is limited due to mental status, patient is non verbal    MEDICATIONS  (STANDING):  gabapentin 100 milliGRAM(s) Oral three times a day  haloperidol    Injectable 0.5 milliGRAM(s) IV Push once  simvastatin 40 milliGRAM(s) Oral at bedtime  traZODone 50 milliGRAM(s) Oral at bedtime  valproic  acid Syrup 250 milliGRAM(s) Oral daily    MEDICATIONS  (PRN):    CAPILLARY BLOOD GLUCOSE        I&O's Summary    28 May 2020 07:01  -  29 May 2020 07:00  --------------------------------------------------------  IN: 300 mL / OUT: 1 mL / NET: 299 mL    29 May 2020 07:01  -  29 May 2020 14:24  --------------------------------------------------------  IN: 680 mL / OUT: 0 mL / NET: 680 mL        PHYSICAL EXAM:  Vital Signs Last 24 Hrs  T(C): 36.2 (29 May 2020 12:14), Max: 36.7 (28 May 2020 20:30)  T(F): 97.2 (29 May 2020 12:14), Max: 98.1 (28 May 2020 23:51)  HR: 85 (29 May 2020 12:14) (63 - 85)  BP: 135/86 (29 May 2020 12:14) (131/80 - 153/80)  BP(mean): --  RR: 20 (29 May 2020 12:14) (18 - 20)  SpO2: 98% (29 May 2020 12:14) (96% - 98%)  CONSTITUTIONAL: Elderly, uncomfortable appearing.   EYES: EOMI, conjunctiva and sclera clear  ENMT: Moist oral mucosa, normal dentition  RESPIRATORY: Normal respiratory effort; lungs are clear to auscultation bilaterally  CARDIOVASCULAR: Regular rate and rhythm, normal S1 and S2, no murmur/rub/gallop; No lower extremity edema; Peripheral pulses are 2+ bilaterally  ABDOMEN: Evaluated post procedure with binder on, did not remove. Tender with palpation diffusely, noted by patient grimace, no distension   NEURO: No facial droop, moving all extremities; contracted on RUE. Non verbal (baseline).  SKIN: No rashes; no palpable lesions    LABS:                        15.5   7.15  )-----------( 219      ( 29 May 2020 09:58 )             46.6     05-29    140  |  103  |  15  ----------------------------<  121<H>  5.4<H>   |  24  |  0.53    Ca    9.1      29 May 2020 09:58    TPro  6.9  /  Alb  3.6  /  TBili  1.9<H>  /  DBili  x   /  AST  154<H>  /  ALT  92<H>  /  AlkPhos  87  05-29    PT/INR - ( 29 May 2020 13:19 )   PT: 12.1 sec;   INR: 1.07 ratio         PTT - ( 29 May 2020 13:19 )  PTT:32.3 sec            RADIOLOGY & ADDITIONAL TESTS:  Results Reviewed:     Imaging Personally Reviewed:    ECG Personally Reviewed:      COORDINATION OF CARE:  Care Discussed with Consultants/Other Providers [Y/N]: medicine ACP, GI  Prior or Outpatient Records Reviewed [Y/N]:
[facility] Division of Hospital Medicine  Marielena Storm MD  Pager (M-F, 7X-9G): [phone]  Other Times:  [phone]    Patient is a 84y old  Female who presents with a chief complaint of PEG tube dislodged (28 May 2020 08:22)      SUBJECTIVE / OVERNIGHT EVENTS: No acute events overnight. Patient non-verbal on my exam, history was limited due to mental status.  ADDITIONAL REVIEW OF SYSTEMS: Limited due to mental status    MEDICATIONS  (STANDING):  dextrose 5% + sodium chloride 0.45%. 1000 milliLiter(s) (50 mL/Hr) IV Continuous <Continuous>  digoxin  Injectable 0.1 milliGRAM(s) IV Push daily  gabapentin 100 milliGRAM(s) Oral three times a day  haloperidol    Injectable 0.5 milliGRAM(s) IV Push once  simvastatin 40 milliGRAM(s) Oral at bedtime  traZODone 50 milliGRAM(s) Oral at bedtime  valproate sodium IVPB 250 milliGRAM(s) IV Intermittent daily    MEDICATIONS  (PRN):    CAPILLARY BLOOD GLUCOSE        I&O's Summary    28 May 2020 07:01  -  28 May 2020 15:04  --------------------------------------------------------  IN: 100 mL / OUT: 0 mL / NET: 100 mL        PHYSICAL EXAM:  Vital Signs Last 24 Hrs  T(C): 36.6 (28 May 2020 11:52), Max: 36.7 (27 May 2020 18:36)  T(F): 97.9 (28 May 2020 11:52), Max: 98.1 (27 May 2020 18:36)  HR: 72 (28 May 2020 13:38) (68 - 76)  BP: 127/73 (28 May 2020 13:38) (127/73 - 155/71)  BP(mean): --  RR: 18 (28 May 2020 13:38) (17 - 20)  SpO2: 98% (28 May 2020 13:38) (93% - 98%)    CONSTITUTIONAL: Calm, well-developed, well-groomed  EYES: EOMI, conjunctiva and sclera clear  ENMT: Moist oral mucosa, normal dentition  RESPIRATORY: Normal respiratory effort; lungs are clear to auscultation bilaterally  CARDIOVASCULAR: Regular rate and rhythm, normal S1 and S2, no murmur/rub/gallop; No lower extremity edema; Peripheral pulses are 2+ bilaterally  ABDOMEN: soft, ? minimally tender near PEG site--difficult to evaluate due to patient mental status, however did try to move hand away on examination of that area, no rebound/guarding; no distension   NEURO: No facial droop, moving all extremities  SKIN: No rashes; no palpable lesions    LABS:                        14.3   7.82  )-----------( 194      ( 27 May 2020 23:20 )             42.5     05-27    141  |  105  |  23  ----------------------------<  97  4.7   |  26  |  0.49<L>    Ca    8.8      27 May 2020 23:20    TPro  6.7  /  Alb  3.4  /  TBili  1.2  /  DBili  x   /  AST  84<H>  /  ALT  63<H>  /  AlkPhos  80  05-27    PT/INR - ( 27 May 2020 23:19 )   PT: 13.6 sec;   INR: 1.19 ratio         PTT - ( 27 May 2020 23:19 )  PTT:33.5 sec            RADIOLOGY & ADDITIONAL TESTS:  Results Reviewed:     Imaging Personally Reviewed:    ECG Personally Reviewed:      COORDINATION OF CARE:  Care Discussed with Consultants/Other Providers [Y/N]: Medicine EVA Renteria  Prior or Outpatient Records Reviewed [Y/N]:
SUBJECTIVE:    No acute events overnight, afebrile, hds.  Pt resting comfortably in bed.    VITAL SIGNS:    Vital Signs Last 24 Hrs  T(C): 36.9 (30 May 2020 08:12), Max: 36.9 (30 May 2020 08:12)  T(F): 98.4 (30 May 2020 08:12), Max: 98.4 (30 May 2020 08:12)  HR: 73 (30 May 2020 08:12) (73 - 91)  BP: 105/68 (30 May 2020 08:12) (105/68 - 135/86)  BP(mean): --  RR: 20 (30 May 2020 08:12) (20 - 20)  SpO2: 98% (30 May 2020 08:12) (97% - 98%)    PHYSICAL EXAM:     GENERAL: no acute distress  RESPIRATORY: LCTAB/L, no rhonchi, rales, or wheezing  CARDIOVASCULAR: RRR, no murmurs, gallops, rubs  ABDOMINAL: soft, non-tender, non-distended, positive bowel sounds, + PEG tube  EXTREMITIES: no clubbing, cyanosis, or edema                            13.9   7.14  )-----------( 186      ( 30 May 2020 07:08 )             42.8     05-30    134<L>  |  99  |  13  ----------------------------<  164<H>  3.9   |  24  |  0.45<L>    Ca    8.5      30 May 2020 07:08    TPro  6.1  /  Alb  2.9<L>  /  TBili  1.1  /  DBili  x   /  AST  92<H>  /  ALT  71<H>  /  AlkPhos  85  05-30      CAPILLARY BLOOD GLUCOSE          MEDICATIONS  (STANDING):  apixaban 2.5 milliGRAM(s) Oral every 12 hours  digoxin     Tablet 0.125 milliGRAM(s) Oral daily  gabapentin 100 milliGRAM(s) Oral three times a day  haloperidol    Injectable 0.5 milliGRAM(s) IV Push once  simvastatin 40 milliGRAM(s) Oral at bedtime  traZODone 50 milliGRAM(s) Oral at bedtime  valproic  acid Syrup 250 milliGRAM(s) Oral daily

## 2020-05-30 NOTE — PROGRESS NOTE ADULT - PROBLEM SELECTOR PLAN 1
Unable to replace in ED  - GI emailed for endoscopic PEG tube replacement, NPO will transition all medications to IV as appropriate (Digoxin, VPA)  - Aspiration precautions
Unable to replace in ED  S/P replacement with GI AM 5/29  - Resumed medications  - Nutrition called for TF recommendations  - D/W GI: Safe to resume A/C today, and safe to resume bolus TF (will resume feeds based on prior admission)  - Aspiration precautions
S/P replacement with GI AM 5/29  - Resumed medications  - Nutrition called for TF recommendations  - D/W GI: Safe to resume A/C, and safe to resume bolus TF (will resume feeds based on prior admission)  - Aspiration precautions  - Care discussed with son and granddaughter and plan is for discharge home today

## 2020-05-30 NOTE — DIETITIAN INITIAL EVALUATION ADULT. - OTHER INFO
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 Per prior RD notes pt had received Jevity 1.2 237cc 4x daily upon prior hospitalizations. Pt currently ordered for bolus feeds of Jevity 1.2, 237cc q6hrs to provide total volume 948mL, 1138kcals, 53g protein, 765mL free fluid daily; has received 2 bolus feeds today, so far. RN notes tolerating EN well without any GI distress. Last BM unknown    Spoke with NP who notes she had a discussion with the pt's relative; notes at home pt has pump and receives Osmolite 1.2, rate was not specified - noted to tolerate formula well. NP states pt to be discharged on Osmolite 1.2 formula.     [facility] ([facility]), 95 Ibs. (3/2019), 100 Ibs. (5/2019). Dosing weight upon current admission (5/27) - 100 pounds. Nutrition focused physical exam deferred at this time 2/2 precautionary measures surrounding the current COVID-19 pandemic.

## 2020-05-30 NOTE — DISCHARGE NOTE NURSING/CASE MANAGEMENT/SOCIAL WORK - PATIENT PORTAL LINK FT
You can access the FollowMyHealth Patient Portal offered by [facility] by registering at the following website: [email or web address]. By joining [facility]’s FollowMyHealth portal, you will also be able to view your health information using other applications (apps) compatible with our system.

## 2020-05-30 NOTE — DIETITIAN INITIAL EVALUATION ADULT. - PERTINENT MEDS FT
MEDICATIONS  (STANDING):  apixaban 2.5 milliGRAM(s) Oral every 12 hours  digoxin     Tablet 0.125 milliGRAM(s) Oral daily  gabapentin 100 milliGRAM(s) Oral three times a day  haloperidol    Injectable 0.5 milliGRAM(s) IV Push once  simvastatin 40 milliGRAM(s) Oral at bedtime  traZODone 50 milliGRAM(s) Oral at bedtime  valproic  acid Syrup 250 milliGRAM(s) Oral daily

## 2020-05-30 NOTE — PROGRESS NOTE ADULT - ASSESSMENT
83 F PMH CVA with residual R sided weakness, dementia c/b dysphagia s/p PEG, nonverbal at baseline, chronic atrial fibrillation on Eliquis, p/w dislodged PEG tube.
83 F PMH CVA with residual R sided weakness, dementia c/b dysphagia s/p PEG, nonverbal at baseline, chronic atrial fibrillation on Eliquis, p/w dislodged PEG tube.
83 F PMH CVA with residual R sided weakness, dementia c/b dysphagia s/p PEG, nonverbal at baseline, chronic atrial fibrillation on Eliquis, p/w dislodged PEG tube now s/p replacement

## 2020-05-30 NOTE — CHART NOTE - NSCHARTNOTEFT_GEN_A_CORE
Pt evaluated at bedside:  - okay to start using PEG today for feeding  - nutrition consult for type and rate of feeding   - keep bumper clean and dry, recommend NO gauze underneath the bumper   - aspiration precautions, elevate head end of the bed  - close observation to prevent dislodgement, can continue to use a binder for protection to prevent the patient from pulling out the PEG  - eventual Speech and Swallow evaluation as an outpatient to evaluate for swallowing, if it improves, can remove the PEG as an outpatient atleast 6-8 weeks post placement   - supportive care as per primary team.

## 2020-10-21 NOTE — PHYSICAL THERAPY INITIAL EVALUATION ADULT - STRENGTHENING, PT EVAL
GOAL: Patient will improve bilateral UE/LE strength to 3+/5, for increased limb stability, to improve gait and facilitate stair negotiation in 2 weeks.
normal

## 2020-12-09 NOTE — DISCHARGE NOTE PROVIDER - PROVIDER TOKENS
regular rate and rhythm , S1, S2 normal , no murmurs, rubs, gallops , no edema
PROVIDER:[TOKEN:[[id number]]]

## 2021-02-05 ENCOUNTER — NON-APPOINTMENT (OUTPATIENT)
Age: 86
End: 2021-02-05

## 2021-02-08 ENCOUNTER — NON-APPOINTMENT (OUTPATIENT)
Age: 86
End: 2021-02-08

## 2021-04-22 ENCOUNTER — INPATIENT (INPATIENT)
Facility: HOSPITAL | Age: 86
LOS: 1 days | Discharge: HOME | DRG: 394 | End: 2021-04-24
Attending: INTERNAL MEDICINE | Admitting: INTERNAL MEDICINE
Payer: MEDICARE

## 2021-04-22 VITALS
HEIGHT: 60 IN | DIASTOLIC BLOOD PRESSURE: 67 MMHG | OXYGEN SATURATION: 98 % | WEIGHT: 95.02 LBS | SYSTOLIC BLOOD PRESSURE: 140 MMHG | HEART RATE: 68 BPM | RESPIRATION RATE: 16 BRPM

## 2021-04-22 DIAGNOSIS — K94.23 GASTROSTOMY MALFUNCTION: ICD-10-CM

## 2021-04-22 DIAGNOSIS — I63.412 CEREBRAL INFARCTION DUE TO EMBOLISM OF LEFT MIDDLE CEREBRAL ARTERY: ICD-10-CM

## 2021-04-22 LAB
ALBUMIN SERPL ELPH-MCNC: 3.7 G/DL — SIGNIFICANT CHANGE UP (ref 3.3–5)
ALP SERPL-CCNC: 61 U/L — SIGNIFICANT CHANGE UP (ref 40–120)
ALT FLD-CCNC: 24 U/L — SIGNIFICANT CHANGE UP (ref 10–45)
ANION GAP SERPL CALC-SCNC: 11 MMOL/L — SIGNIFICANT CHANGE UP (ref 5–17)
APTT BLD: 29.6 SEC — SIGNIFICANT CHANGE UP (ref 27.5–35.5)
AST SERPL-CCNC: 32 U/L — SIGNIFICANT CHANGE UP (ref 10–40)
BASOPHILS # BLD AUTO: 0.04 K/UL — SIGNIFICANT CHANGE UP (ref 0–0.2)
BASOPHILS NFR BLD AUTO: 0.5 % — SIGNIFICANT CHANGE UP (ref 0–2)
BILIRUB SERPL-MCNC: 0.6 MG/DL — SIGNIFICANT CHANGE UP (ref 0.2–1.2)
BLD GP AB SCN SERPL QL: NEGATIVE — SIGNIFICANT CHANGE UP
BUN SERPL-MCNC: 18 MG/DL — SIGNIFICANT CHANGE UP (ref 7–23)
CALCIUM SERPL-MCNC: 9.2 MG/DL — SIGNIFICANT CHANGE UP (ref 8.4–10.5)
CHLORIDE SERPL-SCNC: 107 MMOL/L — SIGNIFICANT CHANGE UP (ref 96–108)
CO2 SERPL-SCNC: 21 MMOL/L — LOW (ref 22–31)
CREAT SERPL-MCNC: 0.44 MG/DL — LOW (ref 0.5–1.3)
EOSINOPHIL # BLD AUTO: 0.09 K/UL — SIGNIFICANT CHANGE UP (ref 0–0.5)
EOSINOPHIL NFR BLD AUTO: 1.1 % — SIGNIFICANT CHANGE UP (ref 0–6)
GLUCOSE SERPL-MCNC: 109 MG/DL — HIGH (ref 70–99)
HCT VFR BLD CALC: 45.9 % — HIGH (ref 34.5–45)
HGB BLD-MCNC: 14.8 G/DL — SIGNIFICANT CHANGE UP (ref 11.5–15.5)
IMM GRANULOCYTES NFR BLD AUTO: 0.2 % — SIGNIFICANT CHANGE UP (ref 0–1.5)
INR BLD: 1 RATIO — SIGNIFICANT CHANGE UP (ref 0.88–1.16)
LYMPHOCYTES # BLD AUTO: 1.47 K/UL — SIGNIFICANT CHANGE UP (ref 1–3.3)
LYMPHOCYTES # BLD AUTO: 17.4 % — SIGNIFICANT CHANGE UP (ref 13–44)
MAGNESIUM SERPL-MCNC: 2.2 MG/DL — SIGNIFICANT CHANGE UP (ref 1.6–2.6)
MCHC RBC-ENTMCNC: 32.1 PG — SIGNIFICANT CHANGE UP (ref 27–34)
MCHC RBC-ENTMCNC: 32.2 GM/DL — SIGNIFICANT CHANGE UP (ref 32–36)
MCV RBC AUTO: 99.6 FL — SIGNIFICANT CHANGE UP (ref 80–100)
MONOCYTES # BLD AUTO: 0.6 K/UL — SIGNIFICANT CHANGE UP (ref 0–0.9)
MONOCYTES NFR BLD AUTO: 7.1 % — SIGNIFICANT CHANGE UP (ref 2–14)
NEUTROPHILS # BLD AUTO: 6.22 K/UL — SIGNIFICANT CHANGE UP (ref 1.8–7.4)
NEUTROPHILS NFR BLD AUTO: 73.7 % — SIGNIFICANT CHANGE UP (ref 43–77)
NRBC # BLD: 0 /100 WBCS — SIGNIFICANT CHANGE UP (ref 0–0)
PLATELET # BLD AUTO: 164 K/UL — SIGNIFICANT CHANGE UP (ref 150–400)
POTASSIUM SERPL-MCNC: 4.8 MMOL/L — SIGNIFICANT CHANGE UP (ref 3.5–5.3)
POTASSIUM SERPL-SCNC: 4.8 MMOL/L — SIGNIFICANT CHANGE UP (ref 3.5–5.3)
PROT SERPL-MCNC: 7.3 G/DL — SIGNIFICANT CHANGE UP (ref 6–8.3)
PROTHROM AB SERPL-ACNC: 12 SEC — SIGNIFICANT CHANGE UP (ref 10.6–13.6)
RBC # BLD: 4.61 M/UL — SIGNIFICANT CHANGE UP (ref 3.8–5.2)
RBC # FLD: 11.9 % — SIGNIFICANT CHANGE UP (ref 10.3–14.5)
RH IG SCN BLD-IMP: POSITIVE — SIGNIFICANT CHANGE UP
SARS-COV-2 RNA SPEC QL NAA+PROBE: SIGNIFICANT CHANGE UP
SODIUM SERPL-SCNC: 139 MMOL/L — SIGNIFICANT CHANGE UP (ref 135–145)
WBC # BLD: 8.44 K/UL — SIGNIFICANT CHANGE UP (ref 3.8–10.5)
WBC # FLD AUTO: 8.44 K/UL — SIGNIFICANT CHANGE UP (ref 3.8–10.5)

## 2021-04-22 PROCEDURE — 93010 ELECTROCARDIOGRAM REPORT: CPT

## 2021-04-22 PROCEDURE — 99285 EMERGENCY DEPT VISIT HI MDM: CPT

## 2021-04-22 PROCEDURE — 99222 1ST HOSP IP/OBS MODERATE 55: CPT | Mod: GC

## 2021-04-22 RX ORDER — TRAZODONE HCL 50 MG
50 TABLET ORAL AT BEDTIME
Refills: 0 | Status: DISCONTINUED | OUTPATIENT
Start: 2021-04-22 | End: 2021-04-24

## 2021-04-22 RX ORDER — VALPROIC ACID (AS SODIUM SALT) 250 MG/5ML
250 SOLUTION, ORAL ORAL THREE TIMES A DAY
Refills: 0 | Status: DISCONTINUED | OUTPATIENT
Start: 2021-04-22 | End: 2021-04-22

## 2021-04-22 RX ORDER — DIGOXIN 250 MCG
125 TABLET ORAL DAILY
Refills: 0 | Status: DISCONTINUED | OUTPATIENT
Start: 2021-04-22 | End: 2021-04-24

## 2021-04-22 RX ORDER — HEPARIN SODIUM 5000 [USP'U]/ML
5000 INJECTION INTRAVENOUS; SUBCUTANEOUS EVERY 12 HOURS
Refills: 0 | Status: DISCONTINUED | OUTPATIENT
Start: 2021-04-22 | End: 2021-04-24

## 2021-04-22 RX ORDER — VALPROIC ACID (AS SODIUM SALT) 250 MG/5ML
250 SOLUTION, ORAL ORAL
Refills: 0 | Status: DISCONTINUED | OUTPATIENT
Start: 2021-04-22 | End: 2021-04-24

## 2021-04-22 RX ORDER — SODIUM CHLORIDE 9 MG/ML
1000 INJECTION, SOLUTION INTRAVENOUS
Refills: 0 | Status: DISCONTINUED | OUTPATIENT
Start: 2021-04-22 | End: 2021-04-23

## 2021-04-22 RX ORDER — SIMVASTATIN 20 MG/1
40 TABLET, FILM COATED ORAL AT BEDTIME
Refills: 0 | Status: DISCONTINUED | OUTPATIENT
Start: 2021-04-22 | End: 2021-04-24

## 2021-04-22 RX ADMIN — SODIUM CHLORIDE 75 MILLILITER(S): 9 INJECTION, SOLUTION INTRAVENOUS at 10:25

## 2021-04-22 RX ADMIN — HEPARIN SODIUM 5000 UNIT(S): 5000 INJECTION INTRAVENOUS; SUBCUTANEOUS at 19:29

## 2021-04-22 RX ADMIN — Medication 25 MILLIGRAM(S): at 22:18

## 2021-04-22 NOTE — ED PROVIDER NOTE - PROGRESS NOTE DETAILS
Kyle PGy2: unable to pass with 14Fr, will admit. Kyle PGy2: all pre-op labs back. Consulted GI for replacement as did endoscopy guided approach in May 2020

## 2021-04-22 NOTE — CONSULT NOTE ADULT - SUBJECTIVE AND OBJECTIVE BOX
Chief Complaint:  Patient is a 85y old  Female who presents with a chief complaint of     HPI: Pt is a 84yo F CVA with residual R sided weakness, dementia c/b dysphagia s/p PEG, nonverbal, chronic atrial fibrillation off eliquis presents with dislodged PEG tube. Hx taken from chart review and son. The son found PEG tube out overnight/early morning. Unclear how it occurred. No other obvious symptoms reported.     Allergies:  No Known Allergies      Home Medications:    Hospital Medications:  lactated ringers. 1000 milliLiter(s) IV Continuous <Continuous>      PMHX/PSHX:  CVA (cerebral vascular accident)    PEG tube malfunction    No significant past surgical history        Family history:  No pertinent family history in first degree relatives        Social History:     ROS: As per HPI, 14-point ROS negative otherwise.    General:  No wt loss, fevers, chills, night sweats, fatigue,   Eyes:  Good vision, no reported pain  ENT:  No sore throat, pain, runny nose, dysphagia  CV:  No pain, palpitations, hypo/hypertension  Resp:  No dyspnea, cough, tachypnea, wheezing  GI:  See HPI  :  No pain, bleeding, incontinence, nocturia  Muscle:  No pain, weakness  Neuro:  No weakness, tingling, memory problems  Psych:  No fatigue, insomnia, mood problems, depression  Endocrine:  No polyuria, polydipsia, cold/heat intolerance  Heme:  No petechiae, ecchymosis, easy bruisability  Skin:  No rash, edema      PHYSICAL EXAM:     Vital Signs:  Vital Signs Last 24 Hrs  T(C): 36.7 (22 Apr 2021 12:55), Max: 37.1 (22 Apr 2021 12:22)  T(F): 98 (22 Apr 2021 12:55), Max: 98.7 (22 Apr 2021 12:22)  HR: 71 (22 Apr 2021 12:55) (68 - 71)  BP: 151/74 (22 Apr 2021 12:55) (139/66 - 151/74)  BP(mean): --  RR: 18 (22 Apr 2021 12:55) (16 - 18)  SpO2: 97% (22 Apr 2021 12:55) (97% - 98%)  Daily Height in cm: 152.4 (22 Apr 2021 08:20)    Daily     GENERAL:  NAD, elderly  HEENT:  NC/AT,  conjunctivae clear and pink  CHEST:  Full & symmetric excursion, no increased effort  HEART:  Regular rhythm, no JVD  ABDOMEN:  Soft, non-tender, non-distended, area of prior peg noted but not track able to be accessed  EXTREMITIES:  no cyanosis, clubbing or edema  SKIN:  No rash/erythema/ecchymoses/petechiae/wounds/abscess/warm/dry  NEURO:  Alert, nonverbal    LABS:                        14.8   8.44  )-----------( 164      ( 22 Apr 2021 10:49 )             45.9     04-22    139  |  107  |  18  ----------------------------<  109<H>  4.8   |  21<L>  |  0.44<L>    Ca    9.2      22 Apr 2021 10:49  Mg     2.2     04-22    TPro  7.3  /  Alb  3.7  /  TBili  0.6  /  DBili  x   /  AST  32  /  ALT  24  /  AlkPhos  61  04-22    LIVER FUNCTIONS - ( 22 Apr 2021 10:49 )  Alb: 3.7 g/dL / Pro: 7.3 g/dL / ALK PHOS: 61 U/L / ALT: 24 U/L / AST: 32 U/L / GGT: x           PT/INR - ( 22 Apr 2021 10:49 )   PT: 12.0 sec;   INR: 1.00 ratio         PTT - ( 22 Apr 2021 10:49 )  PTT:29.6 sec        Imaging:    < from: EGD w/ PEG Placement (05.29.20 @ 09:22) >  Impression:          - Normal esophagus.                       - Normal stomach.                       - An externally removable PEG placement was successfully completed.                       - No specimens collected.  Recommendation:      - [facility] to [facility] for ongoing care.                       - Please follow the post-PEG recommendations including: antibiotic ointment                        to site, check site for bleeding q 4 hrs, clean site with soap and water                        daily and dry thoroughly and start using PEG today.                       -abdominal binder on at all times to prevent patient from pulling out peg.    < end of copied text >         Chief Complaint:  Patient is a 85y old  Female who presents with a chief complaint of dislodged PEG.    Patient is nonverbal, unable to provide history herself.    HPI: Pt is a 84yo F CVA with residual R sided weakness, dementia c/b dysphagia s/p PEG, nonverbal, chronic atrial fibrillation off eliquis presents with dislodged PEG tube. Hx taken from chart review and son. The son found PEG tube out overnight/early morning. Unclear how it occurred. No other obvious symptoms reported.     Allergies:  No Known Allergies      Home Medications:    Hospital Medications:  lactated ringers. 1000 milliLiter(s) IV Continuous <Continuous>      PMHX/PSHX:  CVA (cerebral vascular accident)    PEG tube malfunction    No significant past surgical history        Family history:  No pertinent family history in first degree relatives        Social History: Unable to obtain, patient is non-verbal    ROS: Unable to obtain, patient is non-verbal    PHYSICAL EXAM:     Vital Signs:  Vital Signs Last 24 Hrs  T(C): 36.7 (22 Apr 2021 12:55), Max: 37.1 (22 Apr 2021 12:22)  T(F): 98 (22 Apr 2021 12:55), Max: 98.7 (22 Apr 2021 12:22)  HR: 71 (22 Apr 2021 12:55) (68 - 71)  BP: 151/74 (22 Apr 2021 12:55) (139/66 - 151/74)  BP(mean): --  RR: 18 (22 Apr 2021 12:55) (16 - 18)  SpO2: 97% (22 Apr 2021 12:55) (97% - 98%)  Daily Height in cm: 152.4 (22 Apr 2021 08:20)    Daily     GENERAL:  NAD, elderly  HEENT:  NC/AT,  conjunctivae clear and pink  CHEST:  Full & symmetric excursion, no increased effort  HEART:  Regular rhythm, no JVD  ABDOMEN:  Soft, non-tender, non-distended, area of prior peg noted but no track able to be accessed  EXTREMITIES:  no cyanosis, clubbing or edema  SKIN:  No rash/erythema/ecchymoses/petechiae/wounds/abscess/warm/dry  NEURO:  Alert, nonverbal    LABS:                        14.8   8.44  )-----------( 164      ( 22 Apr 2021 10:49 )             45.9     04-22    139  |  107  |  18  ----------------------------<  109<H>  4.8   |  21<L>  |  0.44<L>    Ca    9.2      22 Apr 2021 10:49  Mg     2.2     04-22    TPro  7.3  /  Alb  3.7  /  TBili  0.6  /  DBili  x   /  AST  32  /  ALT  24  /  AlkPhos  61  04-22    LIVER FUNCTIONS - ( 22 Apr 2021 10:49 )  Alb: 3.7 g/dL / Pro: 7.3 g/dL / ALK PHOS: 61 U/L / ALT: 24 U/L / AST: 32 U/L / GGT: x           PT/INR - ( 22 Apr 2021 10:49 )   PT: 12.0 sec;   INR: 1.00 ratio         PTT - ( 22 Apr 2021 10:49 )  PTT:29.6 sec        Imaging:    < from: EGD w/ PEG Placement (05.29.20 @ 09:22) >  Impression:          - Normal esophagus.                       - Normal stomach.                       - An externally removable PEG placement was successfully completed.                       - No specimens collected.  Recommendation:      - Return patient to [facility] for ongoing care.                       - Please follow the post-PEG recommendations including: antibiotic ointment                        to site, check site for bleeding q 4 hrs, clean site with soap and water                        daily and dry thoroughly and start using PEG today.                       -abdominal binder on at all times to prevent patient from pulling out peg.    < end of copied text >

## 2021-04-22 NOTE — ED PROVIDER NOTE - CLINICAL SUMMARY MEDICAL DECISION MAKING FREE TEXT BOX
84yo female p/w PEG tube dislodgement overnight, unknown how many hours. Soft NT abdomen, no signs of infection. Will attempt replacement and if unable will admit.

## 2021-04-22 NOTE — ED PROVIDER NOTE - ATTENDING CONTRIBUTION TO CARE
Attending MD Valenzuela:  I personally have seen and examined this patient.  Resident note reviewed and agree on plan of care and except where noted.  See HPI, PE, and MDM for details.      85F with ho CVA, non-verbal with PEG tube presenting with PEG displacement. PEG may have been dislodged for 12 hours, unable to pass 14F tube at bedside, tract likely closed. Will admit for GI or IR replacement of enteral tube as patient is dependent on tube for nutrition.

## 2021-04-22 NOTE — ED PROVIDER NOTE - PHYSICAL EXAMINATION
Physical Exam:  Gen: NAD, AOx0, non-toxic appearing, able to ambulate without assistance  Head: NCAT  HEENT: EOMI, PEERLA, normal conjunctiva, tongue midline, oral mucosa moist  Lung: CTAB, no respiratory distress, no wheezes/rhonchi/rales B/L  CV: RRR, no murmurs, rubs or gallops, distal pulses 2+ b/l  Abd: minimal erythema around PEG tube site, no discharge, soft, NT, ND, no guarding, no rigidity, no rebound tenderness, no CVA tenderness   Skin: Warm, well perfused, no rash,  Psych: normal affect, calm

## 2021-04-22 NOTE — ED PROVIDER NOTE - OBJECTIVE STATEMENT
84yo female CVA with residual R sided weakness, dementia c/b dysphagia s/p PEG, nonverbal, chronic atrial fibrillation off eliquis p/w dislodged PEG tube. Patient unable to provide any history as non-verbal. Son at bedside and states patient at her baseline found PEG tube out this AM, may have happened overnight. Has not happened since May 2020. Unsure of size of previous PEG tube.

## 2021-04-22 NOTE — H&P ADULT - HISTORY OF PRESENT ILLNESS
Pt is a 84yo F CVA with residual R sided weakness, dementia c/b dysphagia s/p PEG, nonverbal, chronic atrial fibrillation off eliquis presents with dislodged PEG tube. Hx taken from chart review and son. The son found PEG tube out overnight/early morning. Unclear how it occurred. No other obvious symptoms reported.

## 2021-04-22 NOTE — PATIENT PROFILE ADULT - DOES PATIENT HAVE ADVANCE DIRECTIVE
Past Medical History:   Diagnosis Date     Diabetes mellitus type II      History of agent Orange exposure 2013     Hypertension      Myocardial infarct (H) 1999     Primary hyperparathyroidism (H) Dx approx 2003     Stroke (H) 1998    recovered fully     Patient Active Problem List   Diagnosis     Plantar fascial fibromatosis     History of agent Orange exposure     Degenerative arthritis of cervical spine     Stroke (H)     Skin exam, screening for cancer     Primary hyperparathyroidism (H)     Benign prostatic hyperplasia with weak urinary stream     Acquired cyst of kidney     Past Surgical History:   Procedure Laterality Date     BIOPSY OF SKIN LESION       BYPASS GRAFT ARTERY CORONARY       CATARACT IOL, RT/LT Bilateral 2017    [facility]     EXCISE MASS LOWER EXTREMITY Left 11/3/2017    Procedure: EXCISE MASS LOWER EXTREMITY;  Excision Mass Left Flank;  Surgeon: Marybeth Gambino MD;  Location: [facility] OR     MOHS MICROGRAPHIC PROCEDURE       PARATHYROIDECTOMY N/A 3/21/2017    Procedure: PARATHYROIDECTOMY;  Surgeon: Julianna Short MD;  Location: [facility] OR     PARATHYROIDECTOMY       Social History     Socioeconomic History     Marital status:      Spouse name: Not on file     Number of children: Not on file     Years of education: Not on file     Highest education level: Not on file   Occupational History     Not on file   Social Needs     Financial resource strain: Not on file     Food insecurity:     Worry: Not on file     Inability: Not on file     Transportation needs:     Medical: Not on file     Non-medical: Not on file   Tobacco Use     Smoking status: Former Smoker     Last attempt to quit: 1970     Years since quittin.4     Smokeless tobacco: Former User     Tobacco comment: Doesn't remember how much he used to smoke - during service only.    Substance and Sexual Activity     Alcohol use: Yes     Comment: 1 beer/week     Drug use: No     Sexual activity: Not  on file   Lifestyle     Physical activity:     Days per week: Not on file     Minutes per session: Not on file     Stress: Not on file   Relationships     Social connections:     Talks on phone: Not on file     Gets together: Not on file     Attends Christianity service: Not on file     Active member of club or organization: Not on file     Attends meetings of clubs or organizations: Not on file     Relationship status: Not on file     Intimate partner violence:     Fear of current or ex partner: Not on file     Emotionally abused: Not on file     Physically abused: Not on file     Forced sexual activity: Not on file   Other Topics Concern     Parent/sibling w/ CABG, MI or angioplasty before 65F 55M? Not Asked   Social History Narrative     Not on file     Family History   Problem Relation Age of Onset     Melanoma Mother      Melanoma Father      Cancer No family hx of         no skin cancer     Glaucoma No family hx of      Macular Degeneration No family hx of      Lab Results   Component Value Date    A1C 6.4 04/25/2018    A1C 6.3 11/30/2017    A1C 6.8 10/10/2017    A1C 6.6 12/08/2008    A1C 5.8 06/26/2007     SUBJECTIVE FINDINGS:  A 76-year-old male returns to clinic for diabetic foot check.  He is diabetic with peripheral neuropathy and vascular disease.  He relates he is doing well.  His right big toenail is bothering him.  He relates no ulcers or sores since we have seen him last.  He has diabetic shoes and insoles.  He does get some neuropathy, he relates.  If he eats the wrong foods, that is worse.  He relates he still gets some swelling throughout the foot.  He is wearing his compression socks.      OBJECTIVE FINDINGS:  DP and PT are 2/4 bilaterally.  He has some peripheral edema bilaterally.  There is no erythema, no drainage, no odor, no calor bilaterally.  He has thickened, dystrophic, incurvated right hallux nail with some subungual debris and pressure changes and the nail borders with pain on palpation.       ASSESSMENT AND PLAN:  Ingrown toenail, right hallux.  He has got onychomycosis.  He is diabetic with peripheral neuropathy and vascular disease.  Diagnosis and treatment options discussed with the patient. Continue to use compression socks.  Continue the Lamisil cream.  Right hallux nail was debrided upon consent, Hallux nail border bled a bit upon debridement.  Local wound care with bacitracin and Band-Aid done and use discussed with him.  He will return to clinic and see me in about 3 months.          AMILCAR patient confused

## 2021-04-22 NOTE — CONSULT NOTE ADULT - ASSESSMENT
Impression:  # Peg Dislodgment  # Hx of CVA  # Hx of dementia with dysphagia    Recommendation:  - maintain NPO status  - hold any anticoagulants  - discussed with son, plan for EGD and PEG placement  - plan for peg placement tentatively today, however pending schedule this may occur tmro  - supportive care    Carter Berrios  [phone]  [phone]  Please call/page on call fellow on weekends and weekdays after 5pm   Impression:  # Peg Dislodgment  # Hx of CVA  # Hx of dementia with dysphagia    Recommendation:  - maintain NPO status  - hold any anticoagulants  - discussed with son, plan for EGD and PEG placement  - plan for peg placement tentatively tmro given scheduling  - monitor CBC, CMP, INR; ensure electrlytes are wnl tmro AM  - supportive care    Carter Berrios  [phone]  [phone]  Please call/page on call fellow on weekends and weekdays after 5pm   Impression:  # Peg Dislodgment  # Hx of CVA  # Hx of dementia with dysphagia    Recommendation:  - maintain NPO status  - hold any anticoagulants  - discussed with son, plan for EGD and PEG placement as track has closed  - plan for peg placement tentatively tmro given scheduling  - monitor CBC, CMP, INR; ensure electrlytes are wnl tmro AM  - supportive care    Carter Berrios  [phone]  [phone]  Please call/page on call fellow on weekends and weekdays after 5pm

## 2021-04-22 NOTE — ED ADULT NURSE NOTE - OBJECTIVE STATEMENT
85 y f came to the ed with son after peg tube dislodgement. patient is unable to speak due to CVA son is at the bedside. son states some time last night the peg tube fell out. he is unsure of the time as when the patient went to sleep it was in place but when she woke up it was dislodged. no nonverbal indictors of pain present. abdomen is soft. skin is warm and dry.

## 2021-04-23 LAB
ALBUMIN SERPL ELPH-MCNC: 3.6 G/DL — SIGNIFICANT CHANGE UP (ref 3.3–5)
ALP SERPL-CCNC: 64 U/L — SIGNIFICANT CHANGE UP (ref 40–120)
ALT FLD-CCNC: 23 U/L — SIGNIFICANT CHANGE UP (ref 10–45)
ANION GAP SERPL CALC-SCNC: 9 MMOL/L — SIGNIFICANT CHANGE UP (ref 5–17)
APTT BLD: 32.5 SEC — SIGNIFICANT CHANGE UP (ref 27.5–35.5)
AST SERPL-CCNC: 35 U/L — SIGNIFICANT CHANGE UP (ref 10–40)
BILIRUB SERPL-MCNC: 0.8 MG/DL — SIGNIFICANT CHANGE UP (ref 0.2–1.2)
BUN SERPL-MCNC: 13 MG/DL — SIGNIFICANT CHANGE UP (ref 7–23)
CALCIUM SERPL-MCNC: 8.9 MG/DL — SIGNIFICANT CHANGE UP (ref 8.4–10.5)
CHLORIDE SERPL-SCNC: 105 MMOL/L — SIGNIFICANT CHANGE UP (ref 96–108)
CO2 SERPL-SCNC: 24 MMOL/L — SIGNIFICANT CHANGE UP (ref 22–31)
COVID-19 SPIKE DOMAIN AB INTERP: NEGATIVE — SIGNIFICANT CHANGE UP
COVID-19 SPIKE DOMAIN ANTIBODY RESULT: 0.4 U/ML — SIGNIFICANT CHANGE UP
CREAT SERPL-MCNC: 0.44 MG/DL — LOW (ref 0.5–1.3)
GLUCOSE SERPL-MCNC: 87 MG/DL — SIGNIFICANT CHANGE UP (ref 70–99)
HCT VFR BLD CALC: 42 % — SIGNIFICANT CHANGE UP (ref 34.5–45)
HGB BLD-MCNC: 13.9 G/DL — SIGNIFICANT CHANGE UP (ref 11.5–15.5)
INR BLD: 0.98 RATIO — SIGNIFICANT CHANGE UP (ref 0.88–1.16)
MCHC RBC-ENTMCNC: 32.2 PG — SIGNIFICANT CHANGE UP (ref 27–34)
MCHC RBC-ENTMCNC: 33.1 GM/DL — SIGNIFICANT CHANGE UP (ref 32–36)
MCV RBC AUTO: 97.2 FL — SIGNIFICANT CHANGE UP (ref 80–100)
NRBC # BLD: 0 /100 WBCS — SIGNIFICANT CHANGE UP (ref 0–0)
PLATELET # BLD AUTO: 178 K/UL — SIGNIFICANT CHANGE UP (ref 150–400)
POTASSIUM SERPL-MCNC: 3.7 MMOL/L — SIGNIFICANT CHANGE UP (ref 3.5–5.3)
POTASSIUM SERPL-SCNC: 3.7 MMOL/L — SIGNIFICANT CHANGE UP (ref 3.5–5.3)
PROT SERPL-MCNC: 7 G/DL — SIGNIFICANT CHANGE UP (ref 6–8.3)
PROTHROM AB SERPL-ACNC: 11.8 SEC — SIGNIFICANT CHANGE UP (ref 10.6–13.6)
RBC # BLD: 4.32 M/UL — SIGNIFICANT CHANGE UP (ref 3.8–5.2)
RBC # FLD: 11.7 % — SIGNIFICANT CHANGE UP (ref 10.3–14.5)
SARS-COV-2 IGG+IGM SERPL QL IA: 0.4 U/ML — SIGNIFICANT CHANGE UP
SARS-COV-2 IGG+IGM SERPL QL IA: NEGATIVE — SIGNIFICANT CHANGE UP
SODIUM SERPL-SCNC: 138 MMOL/L — SIGNIFICANT CHANGE UP (ref 135–145)
WBC # BLD: 7.54 K/UL — SIGNIFICANT CHANGE UP (ref 3.8–10.5)
WBC # FLD AUTO: 7.54 K/UL — SIGNIFICANT CHANGE UP (ref 3.8–10.5)

## 2021-04-23 PROCEDURE — 43246 EGD PLACE GASTROSTOMY TUBE: CPT | Mod: GC

## 2021-04-23 RX ORDER — SODIUM CHLORIDE 9 MG/ML
500 INJECTION INTRAMUSCULAR; INTRAVENOUS; SUBCUTANEOUS
Refills: 0 | Status: COMPLETED | OUTPATIENT
Start: 2021-04-23 | End: 2021-04-23

## 2021-04-23 RX ADMIN — Medication 25 MILLIGRAM(S): at 11:45

## 2021-04-23 RX ADMIN — HEPARIN SODIUM 5000 UNIT(S): 5000 INJECTION INTRAVENOUS; SUBCUTANEOUS at 17:44

## 2021-04-23 RX ADMIN — Medication 50 MILLIGRAM(S): at 21:58

## 2021-04-23 RX ADMIN — HEPARIN SODIUM 5000 UNIT(S): 5000 INJECTION INTRAVENOUS; SUBCUTANEOUS at 05:48

## 2021-04-23 RX ADMIN — SODIUM CHLORIDE 30 MILLILITER(S): 9 INJECTION INTRAMUSCULAR; INTRAVENOUS; SUBCUTANEOUS at 09:02

## 2021-04-23 RX ADMIN — Medication 25 MILLIGRAM(S): at 21:58

## 2021-04-23 RX ADMIN — SODIUM CHLORIDE 75 MILLILITER(S): 9 INJECTION, SOLUTION INTRAVENOUS at 15:18

## 2021-04-23 RX ADMIN — SODIUM CHLORIDE 75 MILLILITER(S): 9 INJECTION, SOLUTION INTRAVENOUS at 21:23

## 2021-04-23 RX ADMIN — SODIUM CHLORIDE 75 MILLILITER(S): 9 INJECTION, SOLUTION INTRAVENOUS at 01:12

## 2021-04-23 RX ADMIN — SIMVASTATIN 40 MILLIGRAM(S): 20 TABLET, FILM COATED ORAL at 21:58

## 2021-04-23 NOTE — CHART NOTE - NSCHARTNOTEFT_GEN_A_CORE
S/p successful EGD with PEG placement.   - okay to start using PEG today for feeding  - nutrition consult for type and rate of feeding   - keep bumper clean and dry, recommend NO gauze underneath the bumper (should be removed tmro at home if patient is discharged)  - aspiration precautions, elevate head end of the bed  - close observation to prevent dislodgement, can use a binder for protection to prevent the patient from pulling out the PEG  - supportive care as per primary team  - no objection to discharge today from GI standpoint    Please call us back as needed.

## 2021-04-23 NOTE — PRE-ANESTHESIA EVALUATION ADULT - NSANTHDISPORD_GEN_ALL_CORE
I had the pleasure of seeing Ms. Fernando Ozuna in my office today. Please see my attached note.       Berna Schlatter PACU

## 2021-04-23 NOTE — DIETITIAN NUTRITION RISK NOTIFICATION - TREATMENT: THE FOLLOWING DIET HAS BEEN RECOMMENDED
Diet, NPO with Tube Feed:   Tube Feeding Modality: Gastrostomy  Jevity 1.2 George (JEVITY1.2RTH)  Total Volume for 24 Hours (mL): 948  Bolus  Total Volume of Bolus (mL):  237  Tube Feed Frequency: Every 6 hours   Tube Feed Start Time: 15:00  Bolus Feed Rate (mL per Hour): 0   Bolus Feed Duration (in Hours): 0 (04-23-21 @ 14:51) [Active]

## 2021-04-23 NOTE — PRE PROCEDURE NOTE - PRE PROCEDURE EVALUATION
Attending Physician: Dr. Mckeon                           Procedure: EGD with PEG    Indication for Procedure:  dysphagia    PAST MEDICAL & SURGICAL HISTORY:  CVA (cerebral vascular accident)    PEG tube malfunction    No significant past surgical history      ALLERGIES:  No Known Allergies    HOME MEDICATIONS:  digoxin 125 mcg (0.125 mg) oral tablet: 1 tab(s) orally once a day  Eliquis 5 mg oral tablet: 1 tab(s) orally 2 times a day    NOTE: [facility]  gabapentin 100 mg oral capsule: 1 cap(s) orally 3 times a day    NOTE: [facility]  multivitamin:   Singulair 10 mg oral tablet: 1 tab(s) orally once a day    NOTE: LOCAL PHARMACY LAST FILL 12/2020 - GRANDDAUGHTER STATES PATIENT STILL TAKES  traZODone 50 mg oral tablet: 1 tab(s) orally once a day (at bedtime)  valproic acid 250 mg/5 mL oral syrup: 5 milliliter(s) orally once a day    NOTE: [facility]  Vitamin C:   Zocor 40 mg oral tablet: 1 tab(s) orally once a day (at bedtime)    AICD/PPM: [ ] yes   [x] no    PERTINENT LAB DATA:                        13.9   7.54  )-----------( 178      ( 23 Apr 2021 07:04 )             42.0     04-23    138  |  105  |  13  ----------------------------<  87  3.7   |  24  |  0.44<L>    Ca    8.9      23 Apr 2021 07:04  Mg     2.2     04-22    TPro  7.0  /  Alb  3.6  /  TBili  0.8  /  DBili  x   /  AST  35  /  ALT  23  /  AlkPhos  64  04-23    PT/INR - ( 23 Apr 2021 07:04 )   PT: 11.8 sec;   INR: 0.98 ratio         PTT - ( 23 Apr 2021 07:04 )  PTT:32.5 sec            PHYSICAL EXAMINATION:    Height (cm): 152.4  Weight (kg): 43.1  BMI (kg/m2): 18.6  BSA (m2): 1.36T(C): 36.1  HR: 61  BP: 126/66  RR: 16  SpO2: 95%    Constitutional: NAD  HEENT: PERRLA, EOMI,    Neck:  No JVD  Respiratory: CTAB/L  Cardiovascular: S1 and S2  Gastrointestinal: BS+, soft, NT/ND  Extremities: No peripheral edema  Neurological: A/O x 3, no focal deficits  Psychiatric: Normal mood, normal affect  Skin: No rashes    ASA Class: I [ ]  II [ ]  III [x]  IV [ ]    COMMENTS:    The patient is a suitable candidate for the planned procedure unless box checked [ ]  No, explain:

## 2021-04-23 NOTE — PROGRESS NOTE ADULT - SUBJECTIVE AND OBJECTIVE BOX
Patient is a 85y old  Female who presents with a chief complaint of dislodge PEG tube (23 Apr 2021 14:14)      HPI:  PEG replaced by GI.  PAST MEDICAL & SURGICAL HISTORY:  CVA (cerebral vascular accident)    PEG tube malfunction    No significant past surgical history        Review of Systems:   CONSTITUTIONAL: No fever, weight loss, or fatigue  EYES: No eye pain, visual disturbances, or discharge  ENMT:  No difficulty hearing, tinnitus, vertigo; No sinus or throat pain  NECK: No pain or stiffness  BREASTS: No pain, masses, or nipple discharge  RESPIRATORY: No cough, wheezing, chills or hemoptysis; No shortness of breath  CARDIOVASCULAR: No chest pain, palpitations, dizziness, or leg swelling  GASTROINTESTINAL: No abdominal or epigastric pain. No nausea, vomiting, or hematemesis; No diarrhea or constipation. No melena or hematochezia.  GENITOURINARY: No dysuria, frequency, hematuria, or incontinence  NEUROLOGICAL: No headaches, memory loss, loss of strength, numbness, or tremors  SKIN: No itching, burning, rashes, or lesions   LYMPH NODES: No enlarged glands  ENDOCRINE: No heat or cold intolerance; No hair loss  MUSCULOSKELETAL: No joint pain or swelling; No muscle, back, or extremity pain  PSYCHIATRIC: No depression, anxiety, mood swings, or difficulty sleeping  HEME/LYMPH: No easy bruising, or bleeding gums  ALLERY AND IMMUNOLOGIC: No hives or eczema    Allergies    No Known Allergies    Intolerances        Social History:     FAMILY HISTORY:  No pertinent family history in first degree relatives        MEDICATIONS  (STANDING):  digoxin     Tablet 125 MICROGram(s) Oral daily  heparin   Injectable 5000 Unit(s) SubCutaneous every 12 hours  lactated ringers. 1000 milliLiter(s) (75 mL/Hr) IV Continuous <Continuous>  simvastatin 40 milliGRAM(s) Oral at bedtime  traZODone 50 milliGRAM(s) Oral at bedtime  valproate sodium IVPB 250 milliGRAM(s) IV Intermittent two times a day    MEDICATIONS  (PRN):        CAPILLARY BLOOD GLUCOSE        I&O's Summary      PHYSICAL EXAM:  Vital Signs Last 24 Hrs  T(C): 36.2 (23 Apr 2021 11:49), Max: 36.9 (22 Apr 2021 19:27)  T(F): 97.2 (23 Apr 2021 11:49), Max: 98.5 (22 Apr 2021 19:27)  HR: 88 (23 Apr 2021 11:49) (61 - 98)  BP: 144/87 (23 Apr 2021 11:49) (109/61 - 150/84)  BP(mean): --  RR: 18 (23 Apr 2021 11:49) (16 - 20)  SpO2: 96% (23 Apr 2021 11:49) (95% - 99%)    GENERAL: NAD, well-developed  HEAD:  Atraumatic, Normocephalic  EYES: EOMI, PERRLA, conjunctiva and sclera clear  NECK: Supple, No JVD  CHEST/LUNG: Clear to auscultation bilaterally; No wheeze  HEART: Regular rate and rhythm; No murmurs, rubs, or gallops  ABDOMEN: Soft, Nontender, Nondistended; Bowel sounds present  EXTREMITIES:  2+ Peripheral Pulses, No clubbing, cyanosis, or edema  PSYCH: AAOx3  NEUROLOGY: non-focal  SKIN: No rashes or lesions    LABS:                        13.9   7.54  )-----------( 178      ( 23 Apr 2021 07:04 )             42.0     04-23    138  |  105  |  13  ----------------------------<  87  3.7   |  24  |  0.44<L>    Ca    8.9      23 Apr 2021 07:04  Mg     2.2     04-22    TPro  7.0  /  Alb  3.6  /  TBili  0.8  /  DBili  x   /  AST  35  /  ALT  23  /  AlkPhos  64  04-23    PT/INR - ( 23 Apr 2021 07:04 )   PT: 11.8 sec;   INR: 0.98 ratio         PTT - ( 23 Apr 2021 07:04 )  PTT:32.5 sec          RADIOLOGY & ADDITIONAL TESTS:    Imaging Personally Reviewed:    Consultant(s) Notes Reviewed:      Care Discussed with Consultants/Other Providers:

## 2021-04-23 NOTE — PROGRESS NOTE ADULT - NUTRITIONAL ASSESSMENT
This patient has been assessed with a concern for Malnutrition and has been determined to have a diagnosis/diagnoses of Underweight (BMI < 19).    This patient is being managed with:   Diet NPO with Tube Feed-  Tube Feeding Modality: Gastrostomy  Jevity 1.2 George (JEVITY1.2RTH)  Total Volume for 24 Hours (mL): 948  Bolus  Total Volume of Bolus (mL):  237  Tube Feed Frequency: Every 6 hours   Tube Feed Start Time: 15:00  Bolus Feed Rate (mL per Hour): 0   Bolus Feed Duration (in Hours): 0  Entered: Apr 23 2021  2:51PM

## 2021-04-23 NOTE — DIETITIAN INITIAL EVALUATION ADULT. - ORAL INTAKE PTA/DIET HISTORY
Pt non-verbal, spoke with emergency contact Eve (pt's granddaughter [phone]). Pt receives bolus feeds of Osmolite 1.2 2cans (474ml) 2x per day (4 cans total per day).  Granddaughter reports good tolerance to feeds, no acute GI distress noted. NKFA. Per chart takes multivitamin and vitamin C supplements.

## 2021-04-23 NOTE — DIETITIAN INITIAL EVALUATION ADULT. - OTHER INFO
Weight: granddaughter reports weight has been stable. Weights per [facility] as follows: 98lbs (4/2019), 85lbs (5/25/19), 100lbs (3/27/20), 95lbs (4/22/21).     Pt admitted yesterday, s/p PEG replacement today, per GI ok to start using PEG today. Pt on Osmolite at home however formula not carried in-house, will recommend Jevity 1.2 formula (nutritionally equivalent). Pt may return to Osmolite 1.2 upon discharge.

## 2021-04-23 NOTE — DIETITIAN INITIAL EVALUATION ADULT. - ADD RECOMMEND
1) As medically feasible recommend initiation of enteral feeds. Recommend bolus feeds of Jevity 1.2 @ 237ml q6hrs via PEG ( 1 can =237ml). Regimen at goal provides 948ml total volume, 1137 kcal (26Kcal/kg), ~53gm protein (1.2gm/Kg) and 765ml free-water. Based on dosing weight of 43.1kg. Defer additional free-water team. Pt may return to Osmolite 1.2 formula upon discharge.  2) Monitor tolerance to EN and adjust as needed.

## 2021-04-23 NOTE — DIETITIAN INITIAL EVALUATION ADULT. - CHIEF COMPLAINT
This is a "6yo F CVA with residual R sided weakness, dementia c/b dysphagia s/p PEG, nonverbal, chronic atrial fibrillation off eliquis presents with dislodged PEG tube." Now s/p successful EGD with PEG placement.

## 2021-04-24 ENCOUNTER — TRANSCRIPTION ENCOUNTER (OUTPATIENT)
Age: 86
End: 2021-04-24

## 2021-04-24 VITALS
DIASTOLIC BLOOD PRESSURE: 76 MMHG | HEART RATE: 87 BPM | OXYGEN SATURATION: 96 % | RESPIRATION RATE: 17 BRPM | SYSTOLIC BLOOD PRESSURE: 135 MMHG | TEMPERATURE: 98 F

## 2021-04-24 PROCEDURE — 99285 EMERGENCY DEPT VISIT HI MDM: CPT | Mod: 25

## 2021-04-24 PROCEDURE — 86900 BLOOD TYPING SEROLOGIC ABO: CPT

## 2021-04-24 PROCEDURE — U0003: CPT

## 2021-04-24 PROCEDURE — 85610 PROTHROMBIN TIME: CPT

## 2021-04-24 PROCEDURE — 86850 RBC ANTIBODY SCREEN: CPT

## 2021-04-24 PROCEDURE — 86769 SARS-COV-2 COVID-19 ANTIBODY: CPT

## 2021-04-24 PROCEDURE — 80053 COMPREHEN METABOLIC PANEL: CPT

## 2021-04-24 PROCEDURE — 85027 COMPLETE CBC AUTOMATED: CPT

## 2021-04-24 PROCEDURE — 86901 BLOOD TYPING SEROLOGIC RH(D): CPT

## 2021-04-24 PROCEDURE — 85730 THROMBOPLASTIN TIME PARTIAL: CPT

## 2021-04-24 PROCEDURE — 85025 COMPLETE CBC W/AUTO DIFF WBC: CPT

## 2021-04-24 PROCEDURE — L8699: CPT

## 2021-04-24 PROCEDURE — 83735 ASSAY OF MAGNESIUM: CPT

## 2021-04-24 RX ADMIN — Medication 125 MICROGRAM(S): at 05:24

## 2021-04-24 RX ADMIN — HEPARIN SODIUM 5000 UNIT(S): 5000 INJECTION INTRAVENOUS; SUBCUTANEOUS at 05:23

## 2021-04-24 RX ADMIN — Medication 25 MILLIGRAM(S): at 10:08

## 2021-04-24 NOTE — DISCHARGE NOTE NURSING/CASE MANAGEMENT/SOCIAL WORK - PATIENT PORTAL LINK FT
You can access the FollowMyHealth Patient Portal offered by [facility] by registering at the following website: [email or web address]. By joining [employer]’s FollowMyHealth portal, you will also be able to view your health information using other applications (apps) compatible with our system.

## 2021-04-24 NOTE — DISCHARGE NOTE PROVIDER - NSDCMRMEDTOKEN_GEN_ALL_CORE_FT
digoxin 125 mcg (0.125 mg) oral tablet: 1 tab(s) orally once a day  Eliquis 5 mg oral tablet: 1 tab(s) orally 2 times a day    NOTE: LOCAL PHARMACY LAST FILLED 4/9/2020 - GRANDDAUGHTER STATES PATIENT IS TAKING  gabapentin 100 mg oral capsule: 1 cap(s) orally 3 times a day    NOTE: LOCAL PHARMACY LAST FILLED 5/2020 GRANDDAUGHTER STATES PATIENT IS STILL TAKING  multivitamin:   Singulair 10 mg oral tablet: 1 tab(s) orally once a day    NOTE: LOCAL PHARMACY LAST FILL 12/2020 - GRANDDAUGHTER STATES PATIENT STILL TAKES  traZODone 50 mg oral tablet: 1 tab(s) orally once a day (at bedtime)  valproic acid 250 mg/5 mL oral syrup: 5 milliliter(s) orally once a day    NOTE: LOCAL PHARMACY LAST FILLED 4/9/2020 - GRANDDAUGHTER STATES [facility] IS STILL TAKING  Vitamin C:   Zocor 40 mg oral tablet: 1 tab(s) orally once a day (at bedtime)

## 2021-04-24 NOTE — DISCHARGE NOTE PROVIDER - DETAILS OF MALNUTRITION DIAGNOSIS/DIAGNOSES
This patient has been assessed with a concern for Malnutrition and was treated during this hospitalization for the following Nutrition diagnosis/diagnoses:     -  04/23/2021: Underweight (BMI < 19)

## 2021-04-24 NOTE — DISCHARGE NOTE PROVIDER - NSDCCPCAREPLAN_GEN_ALL_CORE_FT
PRINCIPAL DISCHARGE DIAGNOSIS  Diagnosis: PEG tube malfunction  Assessment and Plan of Treatment: S/p successful EGD with PEG placement. Peg feeding resumed without complication.    - keep bumper clean and dry, recommend NO gauze underneath the bumper (should be removed tmro at home )  - aspiration precautions, elevate head end of the bed  - close observation to prevent dislodgement, can use a binder for protection to prevent the patient from pulling out the PEG  - no objection to discharge today from GI standpoint      SECONDARY DISCHARGE DIAGNOSES  Diagnosis: Cerebrovascular accident (CVA)  Assessment and Plan of Treatment: with generalized weakness, functional quadriplegis, right sided weakness. Needs supportive care.

## 2021-04-24 NOTE — DISCHARGE NOTE PROVIDER - CARE PROVIDER_API CALL
CAMRON DAWN  [id number]  [address] [place] [address] [address] [place] [address], [place] [address]  Phone: ()-  Fax: ()-  Follow Up Time:

## 2021-04-24 NOTE — DISCHARGE NOTE PROVIDER - HOSPITAL COURSE
Patient is a 85y old  Female who presents with a chief complaint of dislodge PEG tube.  S/p successful EGD with PEG placement. Peg feeding resumed without complication.    - keep bumper clean and dry, recommend NO gauze underneath the bumper (should be removed tmro at home if patient is discharged)  - aspiration precautions, elevate head end of the bed  - close observation to prevent dislodgement, can use a binder for protection to prevent the patient from pulling out the PEG  - no objection to discharge today from GI standpoint    Cerebrovascular accident (CVA) due to embolism of left middle cerebral artery.  Plan: With generalized weakness, functional quadriplegis, right sided weakness. Needs supportive care.     D/w discharge Plan with Son Stas today.  Family refuses and nursing services and physical therapy.  Reports he has all supplies for PEG tube feeding. Son will pick patient up and does not need any services for transport home

## 2021-12-20 NOTE — PROGRESS NOTE ADULT - PROBLEM SELECTOR PROBLEM 2
Pt discharged in stable condition, ambulated to vehicle home by herself.          Bev Mccartney LPN  [phone]
CVA (cerebral vascular accident)

## 2022-05-23 ENCOUNTER — INPATIENT (INPATIENT)
Facility: HOSPITAL | Age: 87
LOS: 1 days | Discharge: HOME | DRG: 393 | End: 2022-05-25
Attending: INTERNAL MEDICINE | Admitting: HOSPITALIST
Payer: MEDICARE

## 2022-05-23 VITALS
HEART RATE: 77 BPM | TEMPERATURE: 98 F | SYSTOLIC BLOOD PRESSURE: 143 MMHG | RESPIRATION RATE: 16 BRPM | WEIGHT: 95.02 LBS | OXYGEN SATURATION: 100 % | HEIGHT: 60 IN | DIASTOLIC BLOOD PRESSURE: 74 MMHG

## 2022-05-23 LAB
ALBUMIN SERPL ELPH-MCNC: 4.4 G/DL — SIGNIFICANT CHANGE UP (ref 3.3–5)
ALP SERPL-CCNC: 69 U/L — SIGNIFICANT CHANGE UP (ref 40–120)
ALT FLD-CCNC: 18 U/L — SIGNIFICANT CHANGE UP (ref 10–45)
ANION GAP SERPL CALC-SCNC: 14 MMOL/L — SIGNIFICANT CHANGE UP (ref 5–17)
APTT BLD: 29.7 SEC — SIGNIFICANT CHANGE UP (ref 27.5–35.5)
AST SERPL-CCNC: 24 U/L — SIGNIFICANT CHANGE UP (ref 10–40)
BASOPHILS # BLD AUTO: 0.04 K/UL — SIGNIFICANT CHANGE UP (ref 0–0.2)
BASOPHILS NFR BLD AUTO: 0.5 % — SIGNIFICANT CHANGE UP (ref 0–2)
BILIRUB SERPL-MCNC: 0.6 MG/DL — SIGNIFICANT CHANGE UP (ref 0.2–1.2)
BUN SERPL-MCNC: 22 MG/DL — SIGNIFICANT CHANGE UP (ref 7–23)
CALCIUM SERPL-MCNC: 9.6 MG/DL — SIGNIFICANT CHANGE UP (ref 8.4–10.5)
CHLORIDE SERPL-SCNC: 102 MMOL/L — SIGNIFICANT CHANGE UP (ref 96–108)
CO2 SERPL-SCNC: 23 MMOL/L — SIGNIFICANT CHANGE UP (ref 22–31)
CREAT SERPL-MCNC: 0.51 MG/DL — SIGNIFICANT CHANGE UP (ref 0.5–1.3)
EGFR: 92 ML/MIN/1.73M2 — SIGNIFICANT CHANGE UP
EOSINOPHIL # BLD AUTO: 0.09 K/UL — SIGNIFICANT CHANGE UP (ref 0–0.5)
EOSINOPHIL NFR BLD AUTO: 1.1 % — SIGNIFICANT CHANGE UP (ref 0–6)
GLUCOSE SERPL-MCNC: 113 MG/DL — HIGH (ref 70–99)
HCT VFR BLD CALC: 44 % — SIGNIFICANT CHANGE UP (ref 34.5–45)
HGB BLD-MCNC: 14.5 G/DL — SIGNIFICANT CHANGE UP (ref 11.5–15.5)
IMM GRANULOCYTES NFR BLD AUTO: 0.2 % — SIGNIFICANT CHANGE UP (ref 0–1.5)
INR BLD: 1.03 RATIO — SIGNIFICANT CHANGE UP (ref 0.88–1.16)
LYMPHOCYTES # BLD AUTO: 1.83 K/UL — SIGNIFICANT CHANGE UP (ref 1–3.3)
LYMPHOCYTES # BLD AUTO: 22.2 % — SIGNIFICANT CHANGE UP (ref 13–44)
MCHC RBC-ENTMCNC: 32.2 PG — SIGNIFICANT CHANGE UP (ref 27–34)
MCHC RBC-ENTMCNC: 33 GM/DL — SIGNIFICANT CHANGE UP (ref 32–36)
MCV RBC AUTO: 97.8 FL — SIGNIFICANT CHANGE UP (ref 80–100)
MONOCYTES # BLD AUTO: 0.6 K/UL — SIGNIFICANT CHANGE UP (ref 0–0.9)
MONOCYTES NFR BLD AUTO: 7.3 % — SIGNIFICANT CHANGE UP (ref 2–14)
NEUTROPHILS # BLD AUTO: 5.68 K/UL — SIGNIFICANT CHANGE UP (ref 1.8–7.4)
NEUTROPHILS NFR BLD AUTO: 68.7 % — SIGNIFICANT CHANGE UP (ref 43–77)
NRBC # BLD: 0 /100 WBCS — SIGNIFICANT CHANGE UP (ref 0–0)
PLATELET # BLD AUTO: 225 K/UL — SIGNIFICANT CHANGE UP (ref 150–400)
POTASSIUM SERPL-MCNC: 4.3 MMOL/L — SIGNIFICANT CHANGE UP (ref 3.5–5.3)
POTASSIUM SERPL-SCNC: 4.3 MMOL/L — SIGNIFICANT CHANGE UP (ref 3.5–5.3)
PROT SERPL-MCNC: 8 G/DL — SIGNIFICANT CHANGE UP (ref 6–8.3)
PROTHROM AB SERPL-ACNC: 12 SEC — SIGNIFICANT CHANGE UP (ref 10.5–13.4)
RBC # BLD: 4.5 M/UL — SIGNIFICANT CHANGE UP (ref 3.8–5.2)
RBC # FLD: 12.2 % — SIGNIFICANT CHANGE UP (ref 10.3–14.5)
SODIUM SERPL-SCNC: 139 MMOL/L — SIGNIFICANT CHANGE UP (ref 135–145)
WBC # BLD: 8.26 K/UL — SIGNIFICANT CHANGE UP (ref 3.8–10.5)
WBC # FLD AUTO: 8.26 K/UL — SIGNIFICANT CHANGE UP (ref 3.8–10.5)

## 2022-05-23 PROCEDURE — 99285 EMERGENCY DEPT VISIT HI MDM: CPT

## 2022-05-23 NOTE — ED PROVIDER NOTE - PHYSICAL EXAMINATION
Cyn Tovar MD  GENERAL: Patient awake alert NAD. thin, contracted  HEENT: NC/AT, Moist mucous membranes, EOMI.  LUNGS: CTAB, no wheezes or crackles.   CARDIAC: RRR, no m/r/g.    ABDOMEN: Soft, NT, ND, No rebound, guarding.  +Cracked dry PEG tube, no erythema or edema or discharge from site.  EXT: No edema. No calf tenderness.   MSK: no pain with movement, no deformities.  NEURO: A&Ox0. Moving all extremities.  SKIN: Warm and dry. No rash.  PSYCH: Normal affect.

## 2022-05-23 NOTE — ED ADULT NURSE NOTE - NSIMPLEMENTINTERV_GEN_ALL_ED
Implemented All Fall with Harm Risk Interventions:  [address] to call system. Call bell, personal items and telephone within reach. Instruct patient to call for assistance. Room bathroom lighting operational. Non-slip footwear when patient is off stretcher. Physically safe environment: no spills, clutter or unnecessary equipment. Stretcher in lowest position, wheels locked, appropriate side rails in place. Provide visual cue, wrist band, yellow gown, etc. Monitor gait and stability. Monitor for mental status changes and reorient to person, place, and time. Review medications for side effects contributing to fall risk. Reinforce activity limits and safety measures with patient and family. Provide visual clues: red socks.

## 2022-05-23 NOTE — ED PROVIDER NOTE - OBJECTIVE STATEMENT
86y  F with dementia, prior CVA presents to the ED c/o complication with feeding tube at around 1800. Pt was able to get fed last night normally. Tube is in place due to stroke. Denies F/C, drainage around site. Son is at bedside. PEG tube was placed by GI over 1 yr ago.

## 2022-05-23 NOTE — ED ADULT NURSE NOTE - OBJECTIVE STATEMENT
86 year old female from home, c/o broken feeding tube. Family reports feeding tube broke x2-3 hours ago. Upon assessment, pt A+Ox0, nonverbal, and grinding jaw s/p stroke baseline as per family at bedside. Pt presents to ED with maribell to left hand from home, moaning and irritable.

## 2022-05-23 NOTE — ED PROVIDER NOTE - RAPID ASSESSMENT
86y F presents to the ED c/o broken feeding tube at around 1800. Pt was able to get fed last night. Tube is in place due to stroke. Denies F/C, drainage around site. Pt is well appearing in triage.    Isaiah BOYER (Fabien) have documented this rapid assessment note under the dictation of Sharon Quintero) which has been reviewed and affirmed to be accurate. Patient was seen as a QPA patient. The patient will be seen and further worked up in the main emergency department and their care will be completed by the main emergency department team along with a thorough physical exam. Receiving team will follow up on labs, analgesia, any clinical imaging, reassess and disposition as clinically indicated, all decisions regarding the progression of care will be made at their discretion. 86y F presents to the ED c/o complication with feeding tube at around 1800. Pt was able to get fed last night normally. Tube is in place due to stroke. Denies F/C, drainage around site. Pt is well appearing in triage.    Isaiah BOYER (Scribe) have documented this rapid assessment note under the dictation of Sharon Quintero (PA) which has been reviewed and affirmed to be accurate. Patient was seen as a QPA patient. The patient will be seen and further worked up in the main emergency department and their care will be completed by the main emergency department team along with a thorough physical exam. Receiving team will follow up on labs, analgesia, any clinical imaging, reassess and disposition as clinically indicated, all decisions regarding the progression of care will be made at their discretion.    Sharon Quintero PA-C: The scribe's documentation has been prepared under my direction and personally reviewed by me in its entirety. I confirm that the note above accurately reflects history obtained.   Patient was rapidly assessed via telemedicine encounter; a limited history was obtained. The patient will be seen and further examined and worked up in the main ED and their care will be completed by the main ED team. Receiving team will follow up on labs, analgesia, any clinical imaging, and perform reassessment and disposition of the patient as clinically indicated. All decisions regarding the progression of care will be made at their discretion. 86y F presents to the ED c/o complication with feeding tube at around 1800. Pt was able to get fed last night normally. Tube is in place due to stroke. Denies F/C, drainage around site. Pt is well appearing in triage.    Isaiah BOYER (Scribe) have documented this rapid assessment note under the dictation of Sharon Quintero (PA) which has been reviewed and affirmed to be accurate. Patient was seen as a QPA patient. The patient will be seen and further worked up in the main emergency department and their care will be completed by the main emergency department team along with a thorough physical exam. Receiving team will follow up on labs, analgesia, any clinical imaging, reassess and disposition as clinically indicated, all decisions regarding the progression of care will be made at their discretion.    Sharon Quinteor PA-C: The scribe's documentation has been prepared under my direction and personally reviewed by me in its entirety. I confirm that the note above accurately reflects history obtained.   Patient was rapidly assessed via telemedicine encounter; a limited history was obtained. The patient will be seen and further examined and worked up in the main ED and their care will be completed by the main ED team. Receiving team will follow up on labs, analgesia, any clinical imaging, and perform reassessment and disposition of the patient as clinically indicated. All decisions regarding the progression of care will be made at their discretion.  I, Dr. Kaur, was available to the SHARI Quintero for questions on this case and no questions were asked regarding this patient.

## 2022-05-23 NOTE — ED PROVIDER NOTE - ATTENDING CONTRIBUTION TO CARE
86y F presents to the ED c/o complication with feeding tube at around 1800. The tube looks like an old saldivar catheter, broken off tube can not deflate to admit for peg tube replacement by IR. pt otherwise at baseline dementia.

## 2022-05-24 DIAGNOSIS — K94.23 GASTROSTOMY MALFUNCTION: ICD-10-CM

## 2022-05-24 LAB — SARS-COV-2 RNA SPEC QL NAA+PROBE: SIGNIFICANT CHANGE UP

## 2022-05-24 PROCEDURE — 99222 1ST HOSP IP/OBS MODERATE 55: CPT | Mod: GC

## 2022-05-24 PROCEDURE — 49465 FLUORO EXAM OF G/COLON TUBE: CPT

## 2022-05-24 RX ORDER — CHOLECALCIFEROL (VITAMIN D3) 125 MCG
1 CAPSULE ORAL
Qty: 0 | Refills: 0 | DISCHARGE

## 2022-05-24 RX ORDER — SIMVASTATIN 20 MG/1
1 TABLET, FILM COATED ORAL
Qty: 0 | Refills: 0 | DISCHARGE

## 2022-05-24 RX ORDER — ASCORBIC ACID 60 MG
500 TABLET,CHEWABLE ORAL DAILY
Refills: 0 | Status: DISCONTINUED | OUTPATIENT
Start: 2022-05-24 | End: 2022-05-25

## 2022-05-24 RX ORDER — MONTELUKAST 4 MG/1
1 TABLET, CHEWABLE ORAL
Qty: 0 | Refills: 0 | DISCHARGE

## 2022-05-24 RX ORDER — APIXABAN 2.5 MG/1
1 TABLET, FILM COATED ORAL
Qty: 0 | Refills: 0 | DISCHARGE

## 2022-05-24 RX ORDER — SIMVASTATIN 20 MG/1
40 TABLET, FILM COATED ORAL AT BEDTIME
Refills: 0 | Status: DISCONTINUED | OUTPATIENT
Start: 2022-05-24 | End: 2022-05-25

## 2022-05-24 RX ORDER — CHOLECALCIFEROL (VITAMIN D3) 125 MCG
400 CAPSULE ORAL DAILY
Refills: 0 | Status: DISCONTINUED | OUTPATIENT
Start: 2022-05-24 | End: 2022-05-25

## 2022-05-24 RX ORDER — ASCORBIC ACID 60 MG
0 TABLET,CHEWABLE ORAL
Qty: 0 | Refills: 0 | DISCHARGE

## 2022-05-24 RX ORDER — ASCORBIC ACID 60 MG
1 TABLET,CHEWABLE ORAL
Qty: 0 | Refills: 0 | DISCHARGE

## 2022-05-24 RX ORDER — DIGOXIN 250 MCG
125 TABLET ORAL DAILY
Refills: 0 | Status: DISCONTINUED | OUTPATIENT
Start: 2022-05-24 | End: 2022-05-25

## 2022-05-24 RX ORDER — GABAPENTIN 400 MG/1
1 CAPSULE ORAL
Qty: 0 | Refills: 0 | DISCHARGE

## 2022-05-24 RX ORDER — SODIUM CHLORIDE 9 MG/ML
1000 INJECTION INTRAMUSCULAR; INTRAVENOUS; SUBCUTANEOUS
Refills: 0 | Status: DISCONTINUED | OUTPATIENT
Start: 2022-05-24 | End: 2022-05-25

## 2022-05-24 RX ORDER — DIGOXIN 250 MCG
1 TABLET ORAL
Qty: 0 | Refills: 0 | DISCHARGE

## 2022-05-24 RX ADMIN — SODIUM CHLORIDE 50 MILLILITER(S): 9 INJECTION INTRAMUSCULAR; INTRAVENOUS; SUBCUTANEOUS at 17:50

## 2022-05-24 RX ADMIN — SIMVASTATIN 40 MILLIGRAM(S): 20 TABLET, FILM COATED ORAL at 23:29

## 2022-05-24 NOTE — H&P ADULT - NSHPPHYSICALEXAM_GEN_ALL_CORE
Vital Signs Last 24 Hrs  T(C): 36.5 (24 May 2022 10:55), Max: 36.7 (23 May 2022 19:00)  T(F): 97.7 (24 May 2022 10:55), Max: 98.1 (23 May 2022 19:00)  HR: 62 (24 May 2022 10:55) (62 - 80)  BP: 149/68 (24 May 2022 10:55) (122/68 - 149/68)  BP(mean): --  RR: 18 (24 May 2022 10:55) (16 - 18)  SpO2: 97% (24 May 2022 10:55) (97% - 100%)      Appearance: Weak appearing; Disheveled Female 	  HEENT:   Normal oral mucosa, PERRL, EOMI	  Lymphatic: No lymphadenopathy , no edema  Cardiovascular: Normal S1 S2, No JVD, No murmurs , Peripheral pulses palpable 2+ bilaterally  Respiratory: Lungs clear to auscultation, normal effort 	  Gastrointestinal:  +Abdominal binder; + PEG tube broken in half   Skin: No rashes, No ecchymoses, No cyanosis on visible area   Musculoskeletal: Non- compliant with exam   Psychiatry:  Demented and disheveled   Ext: L Shoe on; R shoe off; R arm in mitten

## 2022-05-24 NOTE — CONSULT NOTE ADULT - SUBJECTIVE AND OBJECTIVE BOX
Chief Complaint:  Patient is a 86y old  Female who presents with a chief complaint of     HPI: 85 year old Female with a PMHx of CVA with residual R sided weakness, dementia complicated by dysphagia s/p PEG, nonverbal, chronic atrial fibrillation off eliquis presents with broken PEG tube. Hx taken from chart review. Attempted to reach granddaughter at number provided in chart - no answer. Per chart review, pt had a 20 Fr externally removable PEG placed 04/2021 and it appears that it has not been exchanged with a balloon PEG since. On admission, VSS, labs unremarkable. GI consulted for PEG exchange.      Allergies:  No Known Allergies      Home Medications:    Hospital Medications:  ascorbic acid 500 milliGRAM(s) Oral daily  cholecalciferol 400 Unit(s) Oral daily  digoxin     Tablet 125 MICROGram(s) Oral daily  simvastatin 40 milliGRAM(s) Oral at bedtime      PMHX/PSHX:  CVA (cerebral vascular accident)    PEG tube malfunction    No significant past surgical history        Family history:  Unable to obtain due as pt is non-verbal    Social History: Unable to obtain due as pt is non-verbal    ROS: Unable to obtain due as pt is non-verbal      Vital Signs:  Vital Signs Last 24 Hrs  T(C): 36.5 (24 May 2022 10:55), Max: 36.7 (23 May 2022 19:00)  T(F): 97.7 (24 May 2022 10:55), Max: 98.1 (23 May 2022 19:00)  HR: 62 (24 May 2022 10:55) (62 - 80)  BP: 149/68 (24 May 2022 10:55) (122/68 - 149/68)  BP(mean): --  RR: 18 (24 May 2022 10:55) (16 - 18)  SpO2: 97% (24 May 2022 10:55) (97% - 100%)  Daily Height in cm: 152.4 (23 May 2022 19:00)    Daily     PHYSICAL EXAM:     GENERAL:  Appears stated age, well-groomed, intermittent distress  HEENT:  NC/AT,  conjunctivae clear and pink  CHEST:  Full & symmetric excursion, no increased effort, breath sounds clear  HEART:  Regular rhythm, S1, S2, no murmur/rub/S3/S4  ABDOMEN:  Soft, non-tender, non-distended, +bowel sounds, +PEG site exchanged at bedside with 20 Fr, 7-10 mL balloon PEG  EXTREMITIES:  no cyanosis, clubbing or edema  SKIN:  No rash/erythema/ecchymoses/petechiae/wounds/abscess/warm/dry  NEURO:  Alert, orientedx0      LABS:                        14.5   8.26  )-----------( 225      ( 23 May 2022 22:45 )             44.0     05-23    139  |  102  |  22  ----------------------------<  113<H>  4.3   |  23  |  0.51    Ca    9.6      23 May 2022 22:45    TPro  8.0  /  Alb  4.4  /  TBili  0.6  /  DBili  x   /  AST  24  /  ALT  18  /  AlkPhos  69  05-23    LIVER FUNCTIONS - ( 23 May 2022 22:45 )  Alb: 4.4 g/dL / Pro: 8.0 g/dL / ALK PHOS: 69 U/L / ALT: 18 U/L / AST: 24 U/L / GGT: x           PT/INR - ( 23 May 2022 22:45 )   PT: 12.0 sec;   INR: 1.03 ratio         PTT - ( 23 May 2022 22:45 )  PTT:29.7 sec        Imaging: No new abdominal imaging               Chief Complaint:  Patient is a 86y old  Female who presents with a chief complaint of PEG malfunction.    History obtained from chart, patient is non-verbal.    HPI: 85 year old Female with a PMHx of CVA with residual R sided weakness, dementia complicated by dysphagia s/p PEG, nonverbal, chronic atrial fibrillation off eliquis presents with broken PEG tube. Hx taken from chart review. Attempted to reach granddaughter at number provided in chart - no answer. Per chart review, pt had a 20 Fr externally removable PEG placed 04/2021 and it appears that it has not been exchanged with a balloon PEG since. On admission, VSS, labs unremarkable. GI consulted for PEG exchange.      Allergies:  No Known Allergies      Home Medications:    Hospital Medications:  ascorbic acid 500 milliGRAM(s) Oral daily  cholecalciferol 400 Unit(s) Oral daily  digoxin     Tablet 125 MICROGram(s) Oral daily  simvastatin 40 milliGRAM(s) Oral at bedtime      PMHX/PSHX:  CVA (cerebral vascular accident)    PEG tube malfunction    No significant past surgical history        Family history:  Unable to obtain due as pt is non-verbal    Social History: Unable to obtain due as pt is non-verbal    ROS: Unable to obtain due as pt is non-verbal      Vital Signs:  Vital Signs Last 24 Hrs  T(C): 36.5 (24 May 2022 10:55), Max: 36.7 (23 May 2022 19:00)  T(F): 97.7 (24 May 2022 10:55), Max: 98.1 (23 May 2022 19:00)  HR: 62 (24 May 2022 10:55) (62 - 80)  BP: 149/68 (24 May 2022 10:55) (122/68 - 149/68)  BP(mean): --  RR: 18 (24 May 2022 10:55) (16 - 18)  SpO2: 97% (24 May 2022 10:55) (97% - 100%)  Daily Height in cm: 152.4 (23 May 2022 19:00)    Daily     PHYSICAL EXAM:     GENERAL:  Appears stated age, well-groomed, intermittent distress  HEENT:  NC/AT,  conjunctivae clear and pink  CHEST:  Full & symmetric excursion, no increased effort, breath sounds clear  HEART:  Regular rhythm, S1, S2  ABDOMEN:  Soft, non-tender, non-distended, +bowel sounds, old 20Fr bumper PEG broken with disconnected stoppers, tube is old and foul smelling and largely broken down over time  EXTREMITIES:  no cyanosis, clubbing or edema  SKIN:  No rash/erythema/ecchymoses/petechiae/wounds/abscess/warm/dry  NEURO:  Alert, orientedx0      LABS:                        14.5   8.26  )-----------( 225      ( 23 May 2022 22:45 )             44.0     05-23    139  |  102  |  22  ----------------------------<  113<H>  4.3   |  23  |  0.51    Ca    9.6      23 May 2022 22:45    TPro  8.0  /  Alb  4.4  /  TBili  0.6  /  DBili  x   /  AST  24  /  ALT  18  /  AlkPhos  69  05-23    LIVER FUNCTIONS - ( 23 May 2022 22:45 )  Alb: 4.4 g/dL / Pro: 8.0 g/dL / ALK PHOS: 69 U/L / ALT: 18 U/L / AST: 24 U/L / GGT: x           PT/INR - ( 23 May 2022 22:45 )   PT: 12.0 sec;   INR: 1.03 ratio         PTT - ( 23 May 2022 22:45 )  PTT:29.7 sec        Imaging: No new abdominal imaging

## 2022-05-24 NOTE — CONSULT NOTE ADULT - ASSESSMENT
85 year old Female with a PMHx of CVA with residual R sided weakness, dementia complicated by dysphagia s/p PEG, nonverbal, chronic atrial fibrillation off eliquis presents with broken PEG tube. Hx taken from chart review. Attempted to reach granddaughter at number provided in chart - no answer. Per chart review, pt had a 20 Fr externally removable PEG placed 04/2021 and it appears that it has not been exchanged with a balloon PEG since. On admission, VSS, labs unremarkable. GI consulted for PEG exchange.    Impression:  #malfunctioning PEG- s/p bedside exchange today (5/24) with 20 Fr, 7-10 mL balloon PEG    Recommendations:  - order XR abd with gastrograffin to confirm tube placement  - hold off on TFs or meds via PEG until placement is confirmed  - place abdominal binder over PEG site as pt has high risk of removing PEG  - Nutrition consult for recommendations on enteral feeds  - avoid gauze between the PEG bumper and skin  - maintain aspiration precautions, elevate HOB during feeds      **THIS NOTE IS NOT FINALIZED UNTIL SIGNED BY THE ATTENDING**    Connie Roper MD  GI Fellow, PGY-4  Available via [employer] Teams    NON-URGENT CONSULTS:  Please email [email or web address]  AT NIGHT AND ON WEEKENDS:  Contact on-call GI fellow via answering service ([phone]) from 5pm-8am and on weekends/holidays  MONDAY-FRIDAY 8AM-5PM:  Pager# [phone] ([facility]) or [phone] ([facility])  GI Phone# [phone] ([facility])   85 year old Female with a PMHx of CVA with residual R sided weakness, dementia complicated by dysphagia s/p PEG, nonverbal, chronic atrial fibrillation off eliquis presents with broken PEG tube. Hx taken from chart review. Attempted to reach granddaughter at number provided in chart - no answer. Per chart review, pt had a 20 Fr externally removable PEG placed 04/2021 and it appears that it has not been exchanged with a balloon PEG since. On admission, VSS, labs unremarkable. GI consulted for PEG exchange.    Impression:  #malfunctioning PEG- s/p bedside exchange today (5/24) with 20 Fr, 7-10 mL balloon PEG, balloon of new PEG inflated with 10cc of sterile water    Recommendations:  - order XR abd with gastrograffin to confirm tube placement  - hold off on TFs or meds via PEG until placement is confirmed  - place abdominal binder over PEG site as pt has high risk of removing PEG  - Nutrition consult for recommendations on enteral feeds  - avoid gauze between the PEG bumper and skin  - maintain aspiration precautions, elevate HOB during feeds  - Patient should follow-up every 3-6 months for PEG exchange, as the plastic PEG will get old and break down over time. She should follow-up with her own GI or at [facility] [phone]      **THIS NOTE IS NOT FINALIZED UNTIL SIGNED BY THE ATTENDING**    Connie Roper MD  GI Fellow, PGY-4  Available via Microsoft Teams    NON-URGENT CONSULTS:  Please email [email or web address]  [email or web address]  AT NIGHT AND ON WEEKENDS:  Contact on-call GI fellow via answering service ([phone]) from 5pm-8am and on weekends/holidays  MONDAY-FRIDAY 8AM-5PM:  Pager# [phone] ([facility]) or [phone] ([facility])  GI Phone# [phone] ([facility])   85 year old Female with a PMHx of CVA with residual R sided weakness, dementia complicated by dysphagia s/p PEG, nonverbal, chronic atrial fibrillation off eliquis presents with broken PEG tube. Hx taken from chart review. Attempted to reach granddaughter at number provided in chart - no answer. Per chart review, pt had a 20 Fr externally removable PEG placed 04/2021 and it appears that it has not been exchanged with a balloon PEG since. On admission, VSS, labs unremarkable. GI consulted for PEG exchange.    Impression:  #malfunctioning PEG- s/p bedside exchange today (5/24) with 20 Fr, 7-10 mL balloon PEG, balloon of new PEG inflated with 10cc of sterile water; patient tolerated PEG exchange well, no complications, EBL was minimal    Recommendations:  - order XR abd with gastrograffin to confirm tube placement  - hold off on TFs or meds via PEG until placement is confirmed  - place abdominal binder over PEG site as pt has high risk of removing PEG  - Nutrition consult for recommendations on enteral feeds  - avoid gauze between the PEG bumper and skin  - maintain aspiration precautions, elevate HOB during feeds  - Patient should follow-up every 3-6 months for PEG exchange, as the plastic PEG will get old and break down over time. She should follow-up with her own GI or at faculty practice [phone]      **THIS NOTE IS NOT FINALIZED UNTIL SIGNED BY THE ATTENDING**    Connie Roper MD  GI Fellow, PGY-4  Available via [employer] Teams    NON-URGENT CONSULTS:  Please email [email or web address]  [email or web address]  AT NIGHT AND ON WEEKENDS:  Contact on-call GI fellow via answering service ([phone]) from 5pm-8am and on weekends/holidays  MONDAY-FRIDAY 8AM-5PM:  Pager# [phone] ([facility]) or [phone] ([facility])  GI Phone# [phone] ([facility])

## 2022-05-24 NOTE — H&P ADULT - NS ATTEND AMEND GEN_ALL_CORE FT
Pt care and plan discussed and reviewed with PA. Plan as outlined above edited by me to reflect our discussion.     Resume eliquis and further meds when clear by GI for feeding through pEG

## 2022-05-24 NOTE — H&P ADULT - HISTORY OF PRESENT ILLNESS
Patient is an 85 year old Female with a PMHx of CVA with residual R sided weakness, dementia complicated by dysphagia s/p PEG, nonverbal, chronic atrial fibrillation off eliquis presents with broken PEG tube. Hx taken from chart review. Attempted to reach Son and Granddaughter at numbers provided in chart - no answer. Patient is non-verbal and non-compliant with exam    Patient is an 85 year old Female with a PMHx of CVA with residual R sided weakness, dementia complicated by dysphagia s/p PEG, nonverbal, chronic atrial fibrillation off eliquis presents with broken PEG tube. Hx taken from chart review. Attempted to reach Son and Granddaughter at numbers provided in chart - no answer. Patient is non-verbal and non-compliant with exam.    Son was able to be reached in Afternoon.

## 2022-05-24 NOTE — CHART NOTE - NSCHARTNOTEFT_GEN_A_CORE
IR Consult: G-tube replacement  Assessment/Plan: 86 year old female with malfunctioning g tube.     - case reviewed with IR attending Dr. Bhatti  -Recommend consulting GI since they have been managing G-tube since initial placement  - d/w primary team

## 2022-05-24 NOTE — CONSULT NOTE ADULT - ATTENDING COMMENTS
Agree with above. Patient with old PEG from over 1 year ago, which is now totally broken down with the stopper tip broken off. PEG exchanged at bedside with new 20Fr balloon PEG. Would check X-ray feeding tube check, if position confirmed can then use PEG today. She should follow-up as outpatient in office for serial exchanges every 6 months or sooner if tube breaks down.

## 2022-05-24 NOTE — ED ADULT NURSE REASSESSMENT NOTE - NS ED NURSE REASSESS COMMENT FT1
Pt A+Ox0 and nonverbal at baseline. Pt admitted to medicine for broken feeding tube. Report given to COLEEN Mead in ED Holding.

## 2022-05-24 NOTE — H&P ADULT - NSHPREVIEWOFSYSTEMS_GEN_ALL_CORE
REVIEW OF SYSTEMS:  Unable to obtain - patient is non-verbal; Demented at baseline  GI: Broken PEG tube

## 2022-05-24 NOTE — H&P ADULT - ASSESSMENT
Patient is an 85 year old Female with a PMHx of CVA with residual R sided weakness, dementia complicated by dysphagia s/p PEG, nonverbal, chronic atrial fibrillation off eliquis presents with broken PEG tube.    PEG tube dysfunction  - F/U IR and GI recs    CVA   - Associated with R sided residual weakness    Dementia  - C/w VPA   - C/w Trazadone     Chronic Afib  - ? Eliquis - per chart review patient is not on eliquis  - ? DIgoxin   - Unable to reach family to confirm       PPX  - Heparin 5K Q8       This note is incomplete  Patient is an 85 year old Female with a PMHx of CVA with residual R sided weakness, dementia complicated by dysphagia s/p PEG, nonverbal, chronic atrial fibrillation off eliquis presents with broken PEG tube.    PEG tube dysfunction  - F/U IR and GI recs    CVA   - Associated with R sided residual weakness    Dementia  - Unsure if patient is on VPA or Trazadone at home     Chronic Afib  - ? Eliquis - per chart review patient is not on eliquis  - ? DIgoxin   - Unable to reach family to confirm       PPX  - Heparin 5K Q8       This note is incomplete  Patient is an 85 year old Female with a PMHx of CVA with residual R sided weakness, dementia complicated by dysphagia s/p PEG, nonverbal, chronic atrial fibrillation off eliquis presents with broken PEG tube.    PEG tube dysfunction  - F/U IR and GI recs  - Eliquis on Hold for possible intervention - resume once cleared by GI /IR     CVA   - Associated with R sided residual weakness  - C/w Eliquis once cleared from GI/IR standpoint     Dementia  - Spoke with SonStas - does not believe patient takes VPA or Trazadone at home (States he picks up her medications)  - Unsure if patient is on VPA or Trazadone at home     Chronic Afib  - C/w Digoxin  - Eliquis on hold for possible intervention by GI/IR  - Resume Eliquis once cleared by IR/GI       PPX  - Resume ELiquis once able to  - PPI        Patient is an 85 year old Female with a PMHx of CVA with residual R sided weakness, dementia complicated by dysphagia s/p PEG, nonverbal, chronic atrial fibrillation off eliquis presents with broken PEG tube.    PEG tube dysfunction  - F/U IR and GI recs  - Eliquis on Hold for possible intervention - resume once cleared by GI /IR   - IVF NS for now for hydration   - Aspiration precautions     CVA   - Associated with R sided residual weakness  - C/w Eliquis once cleared from GI/IR standpoint     Dementia  - Spoke with SonStas - does not believe patient takes VPA or Trazadone at home (States he picks up her medications)  - Unsure if patient is on VPA or Trazadone at home     Chronic Afib  - C/w Digoxin  - Eliquis on hold for possible intervention by GI/IR  - Resume Eliquis once cleared by IR/GI       PPX  - Resume ELiquis once able to; SCDs for now   - PPI        Patient is an 85 year old Female with a PMHx of CVA with residual R sided weakness, dementia complicated by dysphagia s/p PEG, nonverbal, chronic atrial fibrillation off eliquis presents with broken PEG tube.    PEG tube dysfunction  - F/U IR and GI recs  - Eliquis on Hold for possible intervention - resume once cleared by GI /IR   - IVF NS for now for hydration   - Aspiration precautions   - PEG exchange by GI     CVA   - Associated with R sided residual weakness  - C/w Eliquis once cleared from GI/IR standpoint     Dementia  - Spoke with SonStas - does not believe patient takes VPA or Trazadone at home (States he picks up her medications)  - Unsure if patient is on VPA or Trazadone at home     Chronic Afib  - C/w Digoxin  - Eliquis on hold for possible intervention by GI/IR  - Resume Eliquis once cleared by IR/GI       PPX  - Resume ELiquis once able to; SCDs for now   - PPI

## 2022-05-25 ENCOUNTER — TRANSCRIPTION ENCOUNTER (OUTPATIENT)
Age: 87
End: 2022-05-25

## 2022-05-25 VITALS
RESPIRATION RATE: 18 BRPM | TEMPERATURE: 98 F | HEART RATE: 80 BPM | DIASTOLIC BLOOD PRESSURE: 66 MMHG | OXYGEN SATURATION: 94 % | SYSTOLIC BLOOD PRESSURE: 129 MMHG

## 2022-05-25 LAB
ALBUMIN SERPL ELPH-MCNC: 4.2 G/DL — SIGNIFICANT CHANGE UP (ref 3.3–5)
ALP SERPL-CCNC: 67 U/L — SIGNIFICANT CHANGE UP (ref 40–120)
ALT FLD-CCNC: 13 U/L — SIGNIFICANT CHANGE UP (ref 10–45)
ANION GAP SERPL CALC-SCNC: 14 MMOL/L — SIGNIFICANT CHANGE UP (ref 5–17)
AST SERPL-CCNC: 19 U/L — SIGNIFICANT CHANGE UP (ref 10–40)
BILIRUB SERPL-MCNC: 0.5 MG/DL — SIGNIFICANT CHANGE UP (ref 0.2–1.2)
BUN SERPL-MCNC: 24 MG/DL — HIGH (ref 7–23)
CALCIUM SERPL-MCNC: 9.1 MG/DL — SIGNIFICANT CHANGE UP (ref 8.4–10.5)
CHLORIDE SERPL-SCNC: 107 MMOL/L — SIGNIFICANT CHANGE UP (ref 96–108)
CHOLEST SERPL-MCNC: 149 MG/DL — SIGNIFICANT CHANGE UP
CO2 SERPL-SCNC: 23 MMOL/L — SIGNIFICANT CHANGE UP (ref 22–31)
CREAT SERPL-MCNC: 0.47 MG/DL — LOW (ref 0.5–1.3)
EGFR: 94 ML/MIN/1.73M2 — SIGNIFICANT CHANGE UP
GLUCOSE SERPL-MCNC: 77 MG/DL — SIGNIFICANT CHANGE UP (ref 70–99)
HCT VFR BLD CALC: 44 % — SIGNIFICANT CHANGE UP (ref 34.5–45)
HDLC SERPL-MCNC: 40 MG/DL — LOW
HGB BLD-MCNC: 14.2 G/DL — SIGNIFICANT CHANGE UP (ref 11.5–15.5)
LIPID PNL WITH DIRECT LDL SERPL: 86 MG/DL — SIGNIFICANT CHANGE UP
MCHC RBC-ENTMCNC: 32 PG — SIGNIFICANT CHANGE UP (ref 27–34)
MCHC RBC-ENTMCNC: 32.3 GM/DL — SIGNIFICANT CHANGE UP (ref 32–36)
MCV RBC AUTO: 99.1 FL — SIGNIFICANT CHANGE UP (ref 80–100)
NON HDL CHOLESTEROL: 109 MG/DL — SIGNIFICANT CHANGE UP
NRBC # BLD: 0 /100 WBCS — SIGNIFICANT CHANGE UP (ref 0–0)
PLATELET # BLD AUTO: 153 K/UL — SIGNIFICANT CHANGE UP (ref 150–400)
POTASSIUM SERPL-MCNC: 3.8 MMOL/L — SIGNIFICANT CHANGE UP (ref 3.5–5.3)
POTASSIUM SERPL-SCNC: 3.8 MMOL/L — SIGNIFICANT CHANGE UP (ref 3.5–5.3)
PROT SERPL-MCNC: 7 G/DL — SIGNIFICANT CHANGE UP (ref 6–8.3)
RBC # BLD: 4.44 M/UL — SIGNIFICANT CHANGE UP (ref 3.8–5.2)
RBC # FLD: 12.1 % — SIGNIFICANT CHANGE UP (ref 10.3–14.5)
SODIUM SERPL-SCNC: 144 MMOL/L — SIGNIFICANT CHANGE UP (ref 135–145)
TRIGL SERPL-MCNC: 113 MG/DL — SIGNIFICANT CHANGE UP
TSH SERPL-MCNC: 2.1 UIU/ML — SIGNIFICANT CHANGE UP (ref 0.27–4.2)
WBC # BLD: 7.49 K/UL — SIGNIFICANT CHANGE UP (ref 3.8–10.5)
WBC # FLD AUTO: 7.49 K/UL — SIGNIFICANT CHANGE UP (ref 3.8–10.5)

## 2022-05-25 PROCEDURE — 85610 PROTHROMBIN TIME: CPT

## 2022-05-25 PROCEDURE — 85027 COMPLETE CBC AUTOMATED: CPT

## 2022-05-25 PROCEDURE — 80061 LIPID PANEL: CPT

## 2022-05-25 PROCEDURE — 84443 ASSAY THYROID STIM HORMONE: CPT

## 2022-05-25 PROCEDURE — 85025 COMPLETE CBC W/AUTO DIFF WBC: CPT

## 2022-05-25 PROCEDURE — U0003: CPT

## 2022-05-25 PROCEDURE — 85730 THROMBOPLASTIN TIME PARTIAL: CPT

## 2022-05-25 PROCEDURE — U0005: CPT

## 2022-05-25 PROCEDURE — 49465 FLUORO EXAM OF G/COLON TUBE: CPT

## 2022-05-25 PROCEDURE — 80053 COMPREHEN METABOLIC PANEL: CPT

## 2022-05-25 PROCEDURE — 99285 EMERGENCY DEPT VISIT HI MDM: CPT

## 2022-05-25 PROCEDURE — 96374 THER/PROPH/DIAG INJ IV PUSH: CPT

## 2022-05-25 RX ADMIN — Medication 125 MICROGRAM(S): at 06:15

## 2022-05-25 RX ADMIN — Medication 500 MILLIGRAM(S): at 12:19

## 2022-05-25 RX ADMIN — Medication 400 UNIT(S): at 12:19

## 2022-05-25 NOTE — DISCHARGE NOTE NURSING/CASE MANAGEMENT/SOCIAL WORK - NSDCPEFALRISK_GEN_ALL_CORE
For information on Fall & Injury Prevention, visit: [email or web address]  [email or web address] OR  https://www.cdc.gov/steadi/patient.html

## 2022-05-25 NOTE — DIETITIAN INITIAL EVALUATION ADULT - OTHER CALCULATIONS
Dosing wt 95 pounds used for calorie and fluid needs calculation; IBW (BMI <19) 100 pounds for protein needs calculations.

## 2022-05-25 NOTE — DISCHARGE NOTE PROVIDER - NSDCHHNEEDSERVICE_GEN_ALL_CORE
Observation and assessment/Ostomy care and management/Teaching and training/Wound care and assessment

## 2022-05-25 NOTE — DIETITIAN INITIAL EVALUATION ADULT - NSFNSGIIOFT_GEN_A_CORE
05-24-22 @ 07:01  -  05-25-22 @ 07:00  --------------------------------------------------------  OUT:    Stool (mL): 0 mL  Total OUT: 0 mL    Total NET: 474 mL      05-25-22 @ 07:01  -  05-25-22 @ 11:33  --------------------------------------------------------  OUT:    Stool (mL): 0 mL  Total OUT: 0 mL    Total NET: 0 mL

## 2022-05-25 NOTE — DIETITIAN INITIAL EVALUATION ADULT - NSFNSPHYEXAMSKINFT_GEN_A_CORE
95% IBW ( pounds)  Skin: +1 pressure injury L ear fold, +2 pressure injury R ear fold as per flowsheets   Performed nutrition focused physical exam with pt's consent and noted severe signs of muscle/fat loss

## 2022-05-25 NOTE — DIETITIAN INITIAL EVALUATION ADULT - REASON FOR ADMISSION
Gastrostomy malfunction  "85 year old Female with a PMHx of CVA with residual R sided weakness, dementia complicated by dysphagia s/p PEG, nonverbal, chronic atrial fibrillation off eliquis presents with broken PEG tube. Hx taken from chart review. Attempted to reach granddaughter at number provided in chart - no answer. Per chart review, pt had a 20 Fr externally removable PEG placed 04/2021 and it appears that it has not been exchanged with a balloon PEG since. On admission, VSS, labs unremarkable. GI consulted for PEG exchange."

## 2022-05-25 NOTE — DIETITIAN INITIAL EVALUATION ADULT - ETIOLOGY
increased physiological demands of stress factor and wound healing  inadequate protein energy intake in settings of chronic condition

## 2022-05-25 NOTE — PROGRESS NOTE ADULT - ASSESSMENT
Patient is an 85 year old Female with a PMHx of CVA with residual R sided weakness, dementia complicated by dysphagia s/p PEG, nonverbal, chronic atrial fibrillation off eliquis presents with broken PEG tube.    PEG tube dysfunction  - F/U IR and GI recs  - Resume Eliquis once diet resumed   - IVF NS for now for hydration   - Aspiration precautions   - PEG exchange by GI - patient will require outpatient GI F/U for serial tube exchanged Q6 Months or sooner as needed     CVA   - Associated with R sided residual weakness  - C/w Eliquis through peg     Dementia  - Spoke with SonStas - Pt is not on VPA or Trazadone at home (States he picks up her medications)      Chronic Afib  - C/w Digoxin  - Resume Eliquis       PPX  - Resume Eliquis  - PPI       D/C planning - [facility]ume [facility] meds on D/C with outpatient follow up with GI

## 2022-05-25 NOTE — DIETITIAN INITIAL EVALUATION ADULT - PHYSICAL ASSESSMENT DORSAL HAND
Medication Management Service, Warfarin Management  [facility] Medication Management, [phone]  Visit Date: 7/26/2021   Subjective:   Loren Quijano is a 50 y.o. female who presents to clinic today for anticoagulation monitoring and adjustment. Patient was referred for warfarin management due to  Indication:   DVT. INR goal: of 2.0-3.0. Duration of therapy: indefinite    Patient reports the following:   Adherent with regimen:  Yes  Missed or extra doses:  None   Bleeding or thromboembolic side effects:  None  Significant medication, dietary, alcohol, or tobacco changes:  None  Significant recent illness, disease state changes, or hospitalization:  None  Upcoming surgeries or procedures:  None           Assessment and PLAN   PT/INR done in office per protocol. INR today is 2.2, therapeutic. Plan:  Instructed the patient to continue the current warfarin regimen of 5 mg Sun, Wed, Fri and 2.5 mg on all other days. Using warfarin 5 mg tablets. Recheck INR in 4 week(s). Patient verbalized understanding of dosing directions and information discussed. Dosing schedule given to patient. Progress note sent to referring office. Patient acknowledges working in consult agreement with pharmacist as referred by his/her physician.       Electronically signed by Brandi Gardner [facility] on 7/26/21 at 10:35 AM EDT    For Pharmacy Admin Tracking Only     Intervention Detail:    Total # of Interventions Recommended: 0   Total # of Interventions Accepted: 0   Time Spent (min): 15
severe

## 2022-05-25 NOTE — DISCHARGE NOTE PROVIDER - NSDCMRMEDTOKEN_GEN_ALL_CORE_FT
digoxin 125 mcg (0.125 mg) oral tablet: 1 tab(s) orally once a day  multivitamin: 1 tab(s) orally once a day  Singulair 10 mg oral tablet: 1 tab(s) orally once a day    Note:No record with pharmacy  traZODone 50 mg oral tablet: 1 tab(s) orally once a day (at bedtime)\    Note:No record with pharmacy  valproic acid 250 mg/5 mL oral syrup: 5 milliliter(s) orally once a day    NOTE: No record with pharmacy  Vitamin C 500 mg oral tablet: 1 tab(s) orally once a day  Vitamin D3: 1 tab(s) orally once a day  Zocor 40 mg oral tablet: 1 tab(s) orally once a day (at bedtime)

## 2022-05-25 NOTE — DISCHARGE NOTE PROVIDER - HOSPITAL COURSE
85 year old Female with a PMHx of CVA with residual R sided weakness, dementia complicated by dysphagia s/p PEG, nonverbal, chronic atrial fibrillation off eliquis presents with broken PEG tube. Hx taken from chart review.  Per chart review, pt had a 20 Fr externally removable PEG placed 04/2021 and it appears that it has not been exchanged with a balloon PEG since. On admission, VSS, labs unremarkable. GI was consulted for a PEG exchange.    Impression:  #malfunctioning PEG- s/p bedside exchange today (5/24) with 20 Fr, 7-10 mL balloon PEG, balloon of new PEG inflated with 10cc of sterile water; patient tolerated PEG exchange well, no complications, EBL was minimal    Recommendations:  - XR abd with gastrograffin confirmed tube placement  - place abdominal binder over PEG site as pt has high risk of removing PEG  - avoid gauze between the PEG bumper and skin  - maintain aspiration precautions, elevate HOB during feeds  - Patient should follow-up every 3-6 months for PEG exchange, as the plastic PEG will get old and break down over time. She should follow-up with her own GI or at [facility] practice [phone]    Medically stable to discharge home on 5/25/22. Discussed with Dr Lowry (medicine attending)

## 2022-05-25 NOTE — DIETITIAN INITIAL EVALUATION ADULT - PERTINENT MEDS FT
MEDICATIONS  (STANDING):  ascorbic acid 500 milliGRAM(s) Oral daily  cholecalciferol 400 Unit(s) Oral daily  digoxin     Tablet 125 MICROGram(s) Oral daily  simvastatin 40 milliGRAM(s) Oral at bedtime  sodium chloride 0.9%. 1000 milliLiter(s) (50 mL/Hr) IV Continuous <Continuous>    MEDICATIONS  (PRN):

## 2022-05-25 NOTE — DISCHARGE NOTE PROVIDER - NSDCCPCAREPLAN_GEN_ALL_CORE_FT
PRINCIPAL DISCHARGE DIAGNOSIS  Diagnosis: PEG tube malfunction  Assessment and Plan of Treatment: You were seen and evaluated by the Gastroenterology Team and had your PEG tube replaced on 5/24/22. Per their recommendation, the PEG should be changed every 3-6 months. Please avoid gauze between the PEG bumper and skin. Maintain aspiration precautions, and elevate the head of the bed during feeds. Patient should follow-up every 3-6 months for PEG exchange, as the plastic PEG will get old and break down over time. She should follow-up with her own GI or at [facility] [phone]      SECONDARY DISCHARGE DIAGNOSES  Diagnosis: Chronic atrial fibrillation  Assessment and Plan of Treatment: Atrial fibrillation is the most common heart rhythm problem.  The condition puts you at risk for has stroke and heart attack  You were started on blood thinners to prevent possible clot and stroke complications.   Monitor for any signs of bleeding and avoid injury while on this medication  If any bleeding persistent and symptomatic stop the medication and notify your doctor immediately.  It helps if you control your blood pressure, not drink more than 1-2 alcohol drinks per day, cut down on caffeine, getting treatment for over active thyroid gland, and get regular exercise  Call your doctor if you feel your heart racing or beating unusually, chest tightness or pain, lightheaded, faint, shortness of breath especially with exercise  It is important to take your heart medication as prescribed  You may be on anticoagulation which is very important to take as directed - you may need blood work to monitor drug levels

## 2022-05-25 NOTE — DIETITIAN INITIAL EVALUATION ADULT - NS FNS DIET ORDER
Diet, NPO with Tube Feed:   Tube Feeding Modality: Gastrostomy  Jevity 1.2 George (JEVITY1.2RTH)  Total Volume for 24 Hours (mL): 948  Bolus  Total Volume of Bolus (mL):  237  Tube Feed Frequency: Every 6 hours   Tube Feed Start Time: 00:00  Bolus Feed Rate (mL per Hour): 237   Bolus Feed Duration (in Hours): 1  Bolus Feed Instructions:  do not start feeds until placement of g tube has been confirmed by the final xray read (05-25-22 @ 07:15)

## 2022-05-25 NOTE — DIETITIAN INITIAL EVALUATION ADULT - NSFNSNUTRHOMESUPPLEMENTFT_GEN_A_CORE
- Per H&P, PTA supplements/ nutrition related Rx: Vitamin D3, Vitamin C, Zocor, multivitamin, Singulair

## 2022-05-25 NOTE — DIETITIAN INITIAL EVALUATION ADULT - PERTINENT LABORATORY DATA
05-25    144  |  107  |  24<H>  ----------------------------<  77  3.8   |  23  |  0.47<L>    Ca    9.1      25 May 2022 07:12    TPro  7.0  /  Alb  4.2  /  TBili  0.5  /  DBili  x   /  AST  19  /  ALT  13  /  AlkPhos  67  05-25   05-25    144  |  107  |  24<H>  ----------------------------<  77  3.8   |  23  |  0.47<L>    Ca    9.1      25 May 2022 07:12    TPro  7.0  /  Alb  4.2  /  TBili  0.5  /  DBili  x   /  AST  19  /  ALT  13  /  AlkPhos  67  05-25 05-25 @ 07:12: Na 144, BUN 24<H>, Cr 0.47<L>, BG 77, K+ 3.8, Phos --, Mg --, Alk Phos 67, ALT/SGPT 13, AST/SGOT 19, HbA1c --

## 2022-05-25 NOTE — DIETITIAN INITIAL EVALUATION ADULT - ADD RECOMMEND
- If not medically contraindicated, recommend MVI, vitamin C 500 mg daily + zinc 220 mg daily to promote wound healing.   - Prosource x1 /day (40 kcal, 11g protein in each)   - Malnutrition sticker placed, RD to follow-up as per protocol  - Will continue to monitor weight, labs, skin, GI status  - Monitor GI tolerance. RD to remain available to adjust EN formulary, volume/rate PRN.   - RD remains available to review diet education and adjust diet recommendations as needed.

## 2022-05-25 NOTE — DIETITIAN NUTRITION RISK NOTIFICATION - TREATMENT: THE FOLLOWING DIET HAS BEEN RECOMMENDED
Diet, NPO with Tube Feed:   Tube Feeding Modality: Gastrostomy  Jevity 1.2 George (JEVITY1.2RTH)  Total Volume for 24 Hours (mL): 948  Bolus  Total Volume of Bolus (mL):  237  Tube Feed Frequency: Every 6 hours   Tube Feed Start Time: 00:00  Bolus Feed Rate (mL per Hour): 237   Bolus Feed Duration (in Hours): 1  Bolus Feed Instructions:  do not start feeds until placement of g tube has been confirmed by the final xray read (05-25-22 @ 07:15) [Active]

## 2022-05-25 NOTE — DIETITIAN INITIAL EVALUATION ADULT - ENTERAL
Recommend changing Jevity 1.2 to Jevity 1.5 per increased needs and for better meeting EER. Consider: Jevity 1.5 x4 feeds (237 ml each) day. provides: 948 ml, 1422 kcal (33 kcal/kg), 60.5 g/pro (1.3g/pro/kg), and 720 ml free water. defer fluids per team

## 2022-05-25 NOTE — DISCHARGE NOTE NURSING/CASE MANAGEMENT/SOCIAL WORK - PATIENT PORTAL LINK FT
You can access the FollowMyHealth Patient Portal offered by [facility] by registering at the following website: [email or web address]. By joining [facility]’s FollowMyHealth portal, you will also be able to view your health information using other applications (apps) compatible with our system.

## 2022-05-25 NOTE — DIETITIAN INITIAL EVALUATION ADULT - ORAL INTAKE PTA/DIET HISTORY
- pt non verbal, RD unable to interview at this time; Pt AOx0  - per chart arrived with PEG malfunction  - per last RD note 4/23/21 pt was receiving jevity 1.2 bolus feeding x4 (237ml)/day   - as per flowsheets, "Pt AOx0,  PEG wdl, pt tolerating bolus feeds"

## 2022-05-25 NOTE — DISCHARGE NOTE PROVIDER - CARE PROVIDER_API CALL
BYRON DAWN  Internal Medicine  [address] [place] [address]  Phone: [phone]  Fax: [phone]  Established Patient  Follow Up Time: 1 week

## 2022-09-18 ENCOUNTER — INPATIENT (INPATIENT)
Facility: HOSPITAL | Age: 87
LOS: 9 days | Discharge: HOME | DRG: 884 | End: 2022-09-28
Attending: INTERNAL MEDICINE | Admitting: INTERNAL MEDICINE
Payer: MEDICARE

## 2022-09-18 VITALS
RESPIRATION RATE: 18 BRPM | OXYGEN SATURATION: 98 % | DIASTOLIC BLOOD PRESSURE: 81 MMHG | WEIGHT: 104.94 LBS | HEART RATE: 106 BPM | TEMPERATURE: 98 F | SYSTOLIC BLOOD PRESSURE: 124 MMHG | HEIGHT: 60 IN

## 2022-09-18 PROCEDURE — 93010 ELECTROCARDIOGRAM REPORT: CPT

## 2022-09-18 PROCEDURE — 99285 EMERGENCY DEPT VISIT HI MDM: CPT

## 2022-09-18 NOTE — ED ADULT TRIAGE NOTE - CHIEF COMPLAINT QUOTE
Unwitnessed fall out of her wheelchair about 30 minutes PTA.   pt is nonverbal at baseline, is in no apparent distress in triage
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
## 2022-09-19 LAB
ALBUMIN SERPL ELPH-MCNC: 4.2 G/DL — SIGNIFICANT CHANGE UP (ref 3.3–5)
ALP SERPL-CCNC: 78 U/L — SIGNIFICANT CHANGE UP (ref 40–120)
ALT FLD-CCNC: 15 U/L — SIGNIFICANT CHANGE UP (ref 10–45)
ANION GAP SERPL CALC-SCNC: 15 MMOL/L — SIGNIFICANT CHANGE UP (ref 5–17)
APPEARANCE UR: CLEAR — SIGNIFICANT CHANGE UP
AST SERPL-CCNC: 24 U/L — SIGNIFICANT CHANGE UP (ref 10–40)
BACTERIA # UR AUTO: ABNORMAL
BASOPHILS # BLD AUTO: 0.05 K/UL — SIGNIFICANT CHANGE UP (ref 0–0.2)
BASOPHILS NFR BLD AUTO: 0.6 % — SIGNIFICANT CHANGE UP (ref 0–2)
BILIRUB SERPL-MCNC: 0.3 MG/DL — SIGNIFICANT CHANGE UP (ref 0.2–1.2)
BILIRUB UR-MCNC: NEGATIVE — SIGNIFICANT CHANGE UP
BUN SERPL-MCNC: 24 MG/DL — HIGH (ref 7–23)
CALCIUM SERPL-MCNC: 10 MG/DL — SIGNIFICANT CHANGE UP (ref 8.4–10.5)
CHLORIDE SERPL-SCNC: 102 MMOL/L — SIGNIFICANT CHANGE UP (ref 96–108)
CO2 SERPL-SCNC: 23 MMOL/L — SIGNIFICANT CHANGE UP (ref 22–31)
COLOR SPEC: YELLOW — SIGNIFICANT CHANGE UP
COMMENT - URINE: SIGNIFICANT CHANGE UP
CREAT SERPL-MCNC: 0.53 MG/DL — SIGNIFICANT CHANGE UP (ref 0.5–1.3)
DIFF PNL FLD: ABNORMAL
EGFR: 91 ML/MIN/1.73M2 — SIGNIFICANT CHANGE UP
EOSINOPHIL # BLD AUTO: 0.2 K/UL — SIGNIFICANT CHANGE UP (ref 0–0.5)
EOSINOPHIL NFR BLD AUTO: 2.3 % — SIGNIFICANT CHANGE UP (ref 0–6)
EPI CELLS # UR: ABNORMAL
GLUCOSE SERPL-MCNC: 108 MG/DL — HIGH (ref 70–99)
GLUCOSE UR QL: NEGATIVE — SIGNIFICANT CHANGE UP
HCT VFR BLD CALC: 48.2 % — HIGH (ref 34.5–45)
HGB BLD-MCNC: 16 G/DL — HIGH (ref 11.5–15.5)
IMM GRANULOCYTES NFR BLD AUTO: 0.3 % — SIGNIFICANT CHANGE UP (ref 0–0.9)
KETONES UR-MCNC: NEGATIVE — SIGNIFICANT CHANGE UP
LEUKOCYTE ESTERASE UR-ACNC: NEGATIVE — SIGNIFICANT CHANGE UP
LYMPHOCYTES # BLD AUTO: 2.18 K/UL — SIGNIFICANT CHANGE UP (ref 1–3.3)
LYMPHOCYTES # BLD AUTO: 24.5 % — SIGNIFICANT CHANGE UP (ref 13–44)
MCHC RBC-ENTMCNC: 32.2 PG — SIGNIFICANT CHANGE UP (ref 27–34)
MCHC RBC-ENTMCNC: 33.2 GM/DL — SIGNIFICANT CHANGE UP (ref 32–36)
MCV RBC AUTO: 97 FL — SIGNIFICANT CHANGE UP (ref 80–100)
MONOCYTES # BLD AUTO: 0.8 K/UL — SIGNIFICANT CHANGE UP (ref 0–0.9)
MONOCYTES NFR BLD AUTO: 9 % — SIGNIFICANT CHANGE UP (ref 2–14)
NEUTROPHILS # BLD AUTO: 5.62 K/UL — SIGNIFICANT CHANGE UP (ref 1.8–7.4)
NEUTROPHILS NFR BLD AUTO: 63.3 % — SIGNIFICANT CHANGE UP (ref 43–77)
NITRITE UR-MCNC: NEGATIVE — SIGNIFICANT CHANGE UP
NRBC # BLD: 0 /100 WBCS — SIGNIFICANT CHANGE UP (ref 0–0)
PH UR: 6 — SIGNIFICANT CHANGE UP (ref 5–8)
PLATELET # BLD AUTO: 252 K/UL — SIGNIFICANT CHANGE UP (ref 150–400)
POTASSIUM SERPL-MCNC: 4.5 MMOL/L — SIGNIFICANT CHANGE UP (ref 3.5–5.3)
POTASSIUM SERPL-SCNC: 4.5 MMOL/L — SIGNIFICANT CHANGE UP (ref 3.5–5.3)
PROT SERPL-MCNC: 7.9 G/DL — SIGNIFICANT CHANGE UP (ref 6–8.3)
PROT UR-MCNC: ABNORMAL
RBC # BLD: 4.97 M/UL — SIGNIFICANT CHANGE UP (ref 3.8–5.2)
RBC # FLD: 12.1 % — SIGNIFICANT CHANGE UP (ref 10.3–14.5)
RBC CASTS # UR COMP ASSIST: SIGNIFICANT CHANGE UP /HPF (ref 0–4)
SARS-COV-2 RNA SPEC QL NAA+PROBE: SIGNIFICANT CHANGE UP
SODIUM SERPL-SCNC: 140 MMOL/L — SIGNIFICANT CHANGE UP (ref 135–145)
SP GR SPEC: 1.03 — HIGH (ref 1.01–1.02)
UROBILINOGEN FLD QL: NEGATIVE — SIGNIFICANT CHANGE UP
WBC # BLD: 8.88 K/UL — SIGNIFICANT CHANGE UP (ref 3.8–10.5)
WBC # FLD AUTO: 8.88 K/UL — SIGNIFICANT CHANGE UP (ref 3.8–10.5)
WBC UR QL: SIGNIFICANT CHANGE UP

## 2022-09-19 PROCEDURE — 71045 X-RAY EXAM CHEST 1 VIEW: CPT | Mod: 26

## 2022-09-19 PROCEDURE — 73552 X-RAY EXAM OF FEMUR 2/>: CPT | Mod: 26,RT

## 2022-09-19 PROCEDURE — 73502 X-RAY EXAM HIP UNI 2-3 VIEWS: CPT | Mod: 26,RT

## 2022-09-19 PROCEDURE — G1004: CPT

## 2022-09-19 PROCEDURE — 70450 CT HEAD/BRAIN W/O DYE: CPT | Mod: 26,ME

## 2022-09-19 PROCEDURE — 72125 CT NECK SPINE W/O DYE: CPT | Mod: 26,ME

## 2022-09-19 RX ORDER — ASCORBIC ACID 60 MG
500 TABLET,CHEWABLE ORAL DAILY
Refills: 0 | Status: DISCONTINUED | OUTPATIENT
Start: 2022-09-19 | End: 2022-09-28

## 2022-09-19 RX ORDER — MONTELUKAST 4 MG/1
10 TABLET, CHEWABLE ORAL DAILY
Refills: 0 | Status: DISCONTINUED | OUTPATIENT
Start: 2022-09-19 | End: 2022-09-28

## 2022-09-19 RX ORDER — HEPARIN SODIUM 5000 [USP'U]/ML
5000 INJECTION INTRAVENOUS; SUBCUTANEOUS EVERY 8 HOURS
Refills: 0 | Status: DISCONTINUED | OUTPATIENT
Start: 2022-09-19 | End: 2022-09-20

## 2022-09-19 RX ORDER — SIMVASTATIN 20 MG/1
40 TABLET, FILM COATED ORAL AT BEDTIME
Refills: 0 | Status: DISCONTINUED | OUTPATIENT
Start: 2022-09-19 | End: 2022-09-28

## 2022-09-19 RX ORDER — CHOLECALCIFEROL (VITAMIN D3) 125 MCG
1000 CAPSULE ORAL DAILY
Refills: 0 | Status: DISCONTINUED | OUTPATIENT
Start: 2022-09-19 | End: 2022-09-28

## 2022-09-19 RX ORDER — DIGOXIN 250 MCG
125 TABLET ORAL DAILY
Refills: 0 | Status: DISCONTINUED | OUTPATIENT
Start: 2022-09-19 | End: 2022-09-28

## 2022-09-19 RX ADMIN — Medication 1 TABLET(S): at 15:41

## 2022-09-19 RX ADMIN — SIMVASTATIN 40 MILLIGRAM(S): 20 TABLET, FILM COATED ORAL at 21:54

## 2022-09-19 RX ADMIN — Medication 500 MILLIGRAM(S): at 15:41

## 2022-09-19 RX ADMIN — MONTELUKAST 10 MILLIGRAM(S): 4 TABLET, CHEWABLE ORAL at 15:41

## 2022-09-19 RX ADMIN — HEPARIN SODIUM 5000 UNIT(S): 5000 INJECTION INTRAVENOUS; SUBCUTANEOUS at 21:59

## 2022-09-19 RX ADMIN — Medication 1 MILLIGRAM(S): at 15:40

## 2022-09-19 RX ADMIN — Medication 1000 UNIT(S): at 15:41

## 2022-09-19 NOTE — ED ADULT NURSE NOTE - OBJECTIVE STATEMENT
86 y/o female PMH CVA 5 years ago with residual weakness presents to ED from home s/p unwitnessed fall out of wheelchair. Pt is chair/bed bound at home, lives with son who provides care for her. Pt's son states he heard her fall out of chair, found patient on the ground. Pt non-verbal baseline. There are no areas of deformity/bruising noted to extremities. +contracted R arm. 20G IV placed in L hand. Safety and comfort provided. Son at bedside.

## 2022-09-19 NOTE — ED PROVIDER NOTE - ATTENDING CONTRIBUTION TO CARE
87 year old Female with a PMHx of CVA with residual R sided weakness, dementia complicated by dysphagia s/p PEG, nonverbal, chronic atrial fibrillation off eliquis, presenting s/p fall with no signs of trauma noted, as per son r sided pain with contracture old noted, no signs of trauma, films ordered, l hip and pelvis. ct head on ac, son says he can not care for her she couldn't bear weigh on either leg when he stood her after the fall likely admission, labs sent. pt was at baseline prior to the fall.

## 2022-09-19 NOTE — ED PROVIDER NOTE - CLINICAL SUMMARY MEDICAL DECISION MAKING FREE TEXT BOX
87 year old Female with a PMHx of CVA with residual R sided weakness, dementia complicated by dysphagia s/p PEG, nonverbal, chronic atrial fibrillation off eliquis, presenting s/p fall. Unable to assess where pain, no trauma seen on exam. Will get CT head, neck and Xray chest, pelvic, R hip, and R femur, with basic labs.

## 2022-09-19 NOTE — PHYSICAL THERAPY INITIAL EVALUATION ADULT - NSPTDMEREC_GEN_A_CORE
If pt is discharged home, recommend hospital bed, wheelchair for amb, patient-lift device for transfers, transportation into home via ambulance

## 2022-09-19 NOTE — H&P ADULT - ASSESSMENT
87 year old Female with a PMHx of CVA with residual R sided weakness, dementia complicated by dysphagia s/p PEG, nonverbal, chronic atrial fibrillation off eliquis, presenting s/p fall. Patient was found down @9PM from the chair that she was placed on at 6PM. Unknown LOC or head trauma. Patient's son at bedside states unchanged mental status, but having decreased ability to walk 2/2 possible weakness. Patient unable to verbalize pain anywhere.        Mechanical Fall  - CT Head negative for acute findings  - CT Cervical spine negative for acute fracture  - Xrays -- Results are currently preliminary & negative for acute fractures    - PT Consult  - Check TTE  - Check Orthostatics   - Fall and Aspiration precautions    CVA  - With R sided residual weakness  - CT head negative for acute findings    Afib  - C/w Digoxin     Dementia  - Fall and Aspiration precautions         87 year old Female with a PMHx of CVA with residual R sided weakness, dementia complicated by dysphagia s/p PEG, nonverbal, chronic atrial fibrillation off eliquis, presenting s/p fall. Patient was found down @9PM from the chair that she was placed on at 6PM. Unknown LOC or head trauma. Patient's son at bedside states unchanged mental status, but having decreased ability to walk 2/2 possible weakness. Patient unable to verbalize pain anywhere.        Mechanical Fall  - CT Head negative for acute findings  - CT Cervical spine negative for acute fracture  - Xrays -- Results are currently preliminary & negative for acute fractures    - PT Consult  - Check TTE  - Check Orthostatics   - Fall and Aspiration precautions    Abnormal UA  - F/u Urine culture --> in lab   - Monitor CBC, Temp curve, VS and patient closely   - Repeat UA as current with moderate epithelial cells    CVA  - With R sided residual weakness  - CT head negative for acute findings  - Resume PEG tube feedings    Afib  - C/w Digoxin   - C/w Zocor   - Son Unsure of Eliquis dose or if patient is on Eliquis  - Have asked Pharmacy at [phone] to follow up with Med rec    Dementia  - Fall and Aspiration precautions  - Son unsure of patient is on VPA or Trazadone. Have asked pharmacy to obtain Med rec    PPX  - PPI via PEG and DVT PPX for now  - Unsure if patient is on Elqiuis, Son Stas is unsure. Have asked pharmacy      87 year old Female with a PMHx of CVA with residual R sided weakness, dementia complicated by dysphagia s/p PEG, nonverbal, chronic atrial fibrillation off eliquis, presenting s/p fall. Patient was found down @9PM from the chair that she was placed on at 6PM. Unknown LOC or head trauma. Patient's son at bedside states unchanged mental status, but having decreased ability to walk 2/2 possible weakness. Patient unable to verbalize pain anywhere.        Mechanical Fall  - CT Head negative for acute findings  - CT Cervical spine negative for acute fracture  - Xrays -- Results are currently preliminary & negative for acute fractures    - PT/OT Consult  - Check TTE  - Check Orthostatics   - Fall and Aspiration precautions    Abnormal UA  - F/u Urine culture --> in lab   - Monitor CBC, Temp curve, VS and patient closely   - Repeat UA as current with moderate epithelial cells    CVA  - With R sided residual weakness  - CT head negative for acute findings  - Resume PEG tube feedings    Afib  - C/w Digoxin   - C/w Zocor   - Son Unsure of Eliquis dose or if patient is on Eliquis  - Have asked Pharmacy at [phone] to follow up with Med rec    Dementia  - Fall and Aspiration precautions  - Son unsure of patient is on VPA or Trazadone. Have asked pharmacy to obtain Med rec    PPX  - PPI via PEG and DVT PPX for now  - Unsure if patient is on Elqiuis, Chacho Mcclelland is unsure. Have asked pharmacy

## 2022-09-19 NOTE — CONSULT NOTE ADULT - SUBJECTIVE AND OBJECTIVE BOX
DATE OF SERVICE: 09-19-22 @ 17:50    CHIEF COMPLAINT:Patient is a 87y old  Female who presents with a chief complaint of     HISTORY OF PRESENT ILLNESS:  87 year old Female with a PMHx of CVA with residual R sided weakness, dementia complicated by dysphagia s/p PEG placent, nonverbal, chronic atrial fibrillation presenting s/p fall. Patient was found down from chair. Per son Stas, unknown if patient lost consciousness or head trauma. History obtained from Son Stas via telephone and from chart. Patient is non-verbal at baseline.      PAST MEDICAL & SURGICAL HISTORY:  CVA (cerebral vascular accident)      PEG tube malfunction      No significant past surgical history              MEDICATIONS:  digoxin     Tablet 125 MICROGram(s) Oral daily  heparin   Injectable 5000 Unit(s) SubCutaneous every 8 hours      montelukast 10 milliGRAM(s) Oral daily        simvastatin 40 milliGRAM(s) Oral at bedtime    ascorbic acid 500 milliGRAM(s) Oral daily  cholecalciferol 1000 Unit(s) Oral daily  multivitamin 1 Tablet(s) Oral daily      FAMILY HISTORY:      Non-contributory    SOCIAL HISTORY:    [ ] Tobacco  [ ] Drugs  [ ] Alcohol    Allergies    No Known Allergies    Intolerances    	    REVIEW OF SYSTEMS: Non-contributory d/t AMS      All other ROS negative    PHYSICAL EXAM:  T(C): 36.7 (09-19-22 @ 14:34), Max: 36.9 (09-19-22 @ 03:47)  HR: 74 (09-19-22 @ 16:00) (74 - 106)  BP: 124/75 (09-19-22 @ 16:00) (112/80 - 155/82)  RR: 20 (09-19-22 @ 16:00) (16 - 20)  SpO2: 96% (09-19-22 @ 16:00) (96% - 100%)  Wt(kg): --  I&O's Summary      Appearance: frail, obtunded	  HEENT:   Normal oral mucosa, EOMI	  Cardiovascular:  S1 S2, No JVD,    Respiratory: Lungs clear to auscultation	  Psychiatry: Alert  Gastrointestinal:  Soft, Non-tender, + BS	  Skin: No rashes   Neurologic: Non-focal  Extremities:  No edema  Vascular: Peripheral pulses palpable    	    	  	  CARDIAC MARKERS:  Labs personally reviewed by me                                  16.0   8.88  )-----------( 252      ( 19 Sep 2022 02:31 )             48.2     09-19    140  |  102  |  24<H>  ----------------------------<  108<H>  4.5   |  23  |  0.53    Ca    10.0      19 Sep 2022 02:31    TPro  7.9  /  Alb  4.2  /  TBili  0.3  /  DBili  x   /  AST  24  /  ALT  15  /  AlkPhos  78  09-19          EKG: Personally reviewed by me - NSR with non-specific t wave abnormality (baseline)  Radiology: Personally reviewed by me -      Xray Chest 1 View- PORTABLE-Urgent (Xray Chest 1 View- PORTABLE-Urgent .) (09.19.22 @ 02:42) >  No focal consolidation.    CT Cervical Spine No Cont (09.19.22 @ 02:29) >  1. No acute intracranial hemorrhage, territorial infarct, mass effect or   calvarial fracture.  2. Multilevel degenerative changes of the cervical spine without evidence   of a fracture.          Assessment /Plan:     87 year old Female with a PMHx of CVA with residual R sided weakness, dementia complicated by dysphagia s/p PEG placent, nonverbal, chronic atrial fibrillation presenting s/p fall. Patient was found down from chair. Per son Stas, unknown if patient lost consciousness or head trauma. History obtained from Son Stas via telephone and from chart. Patient is non-verbal at baseline.    Problem/Plan #1  Problem: s/p Fall  Plan: r/o syncope  - EKG at baseline with no ischemic changes noted  - CT Head with no acute intracranial hemorrhage infarct, mass or fracture  - Will obtain formal TTE  - Monitor on tele  - PT consult    Problem/Plan #2  Problem: Chronic Atrial Fibrillation  Plan: c/w Digoxin  - Family unsure if patient is currently on AC  - Will clarify home medications    Problem/Plan #3  Problem: CVA  Plan:  - PT consult  - s/p PEG: c/w tube feedings    Problem/Plan #4  Problem: DVT PPx  Plan: c/w SQ Heparin    Differential diagnosis and plan of care discussed with patient after the evaluation. Counseling on diet, nutritional counseling, weight management, exercise and medication compliance was done.   Advanced care planning/advanced directives discussed with patient/family. DNR status including forceful chest compressions to attempt to restart the heart, ventilator support/artificial breathing, electric shock, artificial nutrition, health care proxy, Molst form all discussed with pt. Pt wishes to consider. More than fifteen minutes spent on discussing advanced directives.     Hilda Benjamin [facility]-BC  Moo Rizo DO [facility]  Cardiovascular Medicine  [address] [place] [address], [address]  Office [phone]  Cell [phone] DATE OF SERVICE: 09-19-22 @ 17:50    CHIEF COMPLAINT:Patient is a 87y old  Female who presents with a chief complaint of     HISTORY OF PRESENT ILLNESS:  87 year old Female with a PMHx of CVA with residual R sided weakness, dementia complicated by dysphagia s/p PEG placent, nonverbal, chronic atrial fibrillation presenting s/p fall. Patient was found down from chair. Per son Stas, unknown if patient lost consciousness or head trauma. History obtained from Son Stas via telephone and from chart. Patient is non-verbal at baseline.      PAST MEDICAL & SURGICAL HISTORY:  CVA (cerebral vascular accident)      PEG tube malfunction      No significant past surgical history              MEDICATIONS:  digoxin     Tablet 125 MICROGram(s) Oral daily  heparin   Injectable 5000 Unit(s) SubCutaneous every 8 hours      montelukast 10 milliGRAM(s) Oral daily        simvastatin 40 milliGRAM(s) Oral at bedtime    ascorbic acid 500 milliGRAM(s) Oral daily  cholecalciferol 1000 Unit(s) Oral daily  multivitamin 1 Tablet(s) Oral daily      FAMILY HISTORY:      Non-contributory    SOCIAL HISTORY:    [ ] Tobacco  [ ] Drugs  [ ] Alcohol    Allergies    No Known Allergies    Intolerances    	    REVIEW OF SYSTEMS: Non-contributory d/t AMS      All other ROS negative    PHYSICAL EXAM:  T(C): 36.7 (09-19-22 @ 14:34), Max: 36.9 (09-19-22 @ 03:47)  HR: 74 (09-19-22 @ 16:00) (74 - 106)  BP: 124/75 (09-19-22 @ 16:00) (112/80 - 155/82)  RR: 20 (09-19-22 @ 16:00) (16 - 20)  SpO2: 96% (09-19-22 @ 16:00) (96% - 100%)  Wt(kg): --  I&O's Summary      Appearance: frail, obtunded	  HEENT:   Normal oral mucosa, EOMI	  Cardiovascular:  S1 S2, No JVD,    Respiratory: Lungs clear to auscultation	  Psychiatry: Alert  Gastrointestinal:  Soft, Non-tender, + BS	  Skin: No rashes   Neurologic: Non-focal  Extremities:  No edema  Vascular: Peripheral pulses palpable    	    	  	  CARDIAC MARKERS:  Labs personally reviewed by me                                  16.0   8.88  )-----------( 252      ( 19 Sep 2022 02:31 )             48.2     09-19    140  |  102  |  24<H>  ----------------------------<  108<H>  4.5   |  23  |  0.53    Ca    10.0      19 Sep 2022 02:31    TPro  7.9  /  Alb  4.2  /  TBili  0.3  /  DBili  x   /  AST  24  /  ALT  15  /  AlkPhos  78  09-19          EKG: Personally reviewed by me - NSR with non-specific t wave abnormality (baseline)  Radiology: Personally reviewed by me -      Xray Chest 1 View- PORTABLE-Urgent (Xray Chest 1 View- PORTABLE-Urgent .) (09.19.22 @ 02:42) >  No focal consolidation.    CT Cervical Spine No Cont (09.19.22 @ 02:29) >  1. No acute intracranial hemorrhage, territorial infarct, mass effect or   calvarial fracture.  2. Multilevel degenerative changes of the cervical spine without evidence   of a fracture.          Assessment /Plan:     87 year old Female with a PMHx of CVA with residual R sided weakness, dementia complicated by dysphagia s/p PEG placent, nonverbal, chronic atrial fibrillation presenting s/p fall. Patient was found down from chair. Per son Stas, unknown if patient lost consciousness or head trauma. History obtained from Son Stas via telephone and from chart. Patient is non-verbal at baseline.    Problem/Plan #1  Problem: s/p Fall  Plan: r/o syncope  - EKG at baseline with no ischemic changes noted  - CT Head with no acute intracranial hemorrhage infarct, mass or fracture  - Will obtain formal TTE  - Monitor on tele  - PT consult    Problem/Plan #2  Problem: Chronic Atrial Fibrillation  Plan: c/w Digoxin  - Family unsure if patient is currently on AC  - Will clarify home medications    Problem/Plan #3  Problem: CVA  Plan:  - PT consult  - s/p PEG: c/w tube feedings    Problem/Plan #4  Problem: DVT PPx  Plan: c/w SQ Heparin        Differential diagnosis and plan of care discussed with patient after the evaluation. Counseling on diet, nutritional counseling, weight management, exercise and medication compliance was done.   Advanced care planning/advanced directives discussed with patient/family. DNR status including forceful chest compressions to attempt to restart the heart, ventilator support/artificial breathing, electric shock, artificial nutrition, health care proxy, Molst form all discussed with pt. Pt wishes to consider. More than fifteen minutes spent on discussing advanced directives.     Hilda Benjamin [facility] [place]-BC  Moo Rizo DO [facility]  Cardiovascular Medicine  [address] [place] [address], [address]  Office [phone]  Cell [phone]

## 2022-09-19 NOTE — PHYSICAL THERAPY INITIAL EVALUATION ADULT - NSPTDISCHREC_GEN_A_CORE
TBD pending further OOB activity. If pt is discharged home recommend assist with ALL functional mobility.

## 2022-09-19 NOTE — ED ADULT NURSE NOTE - NSIMPLEMENTINTERV_GEN_ALL_ED
Implemented All Fall with Harm Risk Interventions:  [place] to call system. Call bell, personal items and telephone within reach. Instruct patient to call for assistance. Room bathroom lighting operational. Non-slip footwear when patient is off stretcher. Physically safe environment: no spills, clutter or unnecessary equipment. Stretcher in lowest position, wheels locked, appropriate side rails in place. Provide visual cue, wrist band, yellow gown, etc. Monitor gait and stability. Monitor for mental status changes and reorient to person, place, and time. Review medications for side effects contributing to fall risk. Reinforce activity limits and safety measures with patient and family. Provide visual clues: red socks.

## 2022-09-19 NOTE — PHYSICAL THERAPY INITIAL EVALUATION ADULT - CRITERIA FOR SKILLED THERAPEUTIC INTERVENTIONS
impairments found/functional limitations in following categories/rehab potential/therapy frequency/predicted duration of therapy intervention/anticipated equipment needs at discharge/anticipated discharge recommendation
no loss of consciousness, no gait abnormality, no headache, no sensory deficits, and no weakness.

## 2022-09-19 NOTE — PHYSICAL THERAPY INITIAL EVALUATION ADULT - PASSIVE RANGE OF MOTION EXAMINATION, REHAB EVAL
unable to assess RUE/RLE 2/2 pt inc'd agitation,/Left UE Passive ROM was WFL (within functional limits)/Left LE Passive ROM was WFL (within functional limits)

## 2022-09-19 NOTE — ED PROVIDER NOTE - EKG ADDITIONAL QUESTION - PERFORMED INDEPENDENT VISUALIZATION
Follow-Up Note - [address]  69 y o  female  :1944  GQJ:[id number]    CC: I saw this patient for follow-up in clinic today for her Sleep Disordered Breathing, Coexisting Sleep and Medical Problems  PFSH, Problem List, Medications & Allergies were reviewed in EMR  Interval changes: none reported  She  has a past medical history of Abnormal mammogram of right breast (2017); Acute medial meniscal tear, left, initial encounter (2017); Arthritis; Generalized anxiety disorder (2017); GERD (gastroesophageal reflux disease); Herpes zoster (2016); Hyperlipidemia; Hypertension; Hypokalemia; Left medial knee pain; Localized osteoarthritis of left knee (03/15/2016); Post-traumatic headache (2017); and Restless leg syndrome  She has a current medication list which includes the following prescription(s): aspirin, atorvastatin, fluorouracil, latanoprost, losartan, omeprazole, potassium chloride, triamterene-hydrochlorothiazide, verapamil, amlodipine, gabapentin, and potassium chloride  ROS: Reviewed (see attached)  HPI: Derek Tejeda reports no  difficulty tolerating PAP; With respect to compliance, data download shows:  No data was available, but she reports regular use of the device for > 4hours/night  · She is experiencing some adverse effects: dry mouth and nasal congesetion  · She is benefitting from use and reports: sleeping better   · Her restless leg symptoms are controlled and she is not experiencing jerking movements during sleep on gabapentin  However dose was increased because of occipital neuralgia and she is now taking 600 mg q h s     She is asking whether she could change to 300 mg b i d  Sleep Routine: She reports getting 6-8 hours sleep  ; she has no difficulty initiating or maintaining sleep   She awakens spontaneously feeling refreshed most of the time  She has excessive drowsiness and tends to doze off when sedentary   She rated herself at Total score: 11 /24 on the [place] sleepiness scale  Habits: reports that she has never smoked  She has never used smokeless tobacco ,  reports that she drinks alcohol ,  reports that she does not use drugs  , Caffeine use: limited , Exercise routine: regular    EXAM: /70   Pulse 64   Ht 5' 2" (1 575 m)   Wt 95 3 kg (210 lb)   BMI 38 41 kg/m²      Patient is well groomed; well appearing; no deformity  She is alert, orientated, cooperative and in no distress  Mental state appears normal   Skin/Extrem: warm & dry; col & hydration normal; no edema  EOMI; There are no facial pressure marks or rashes  Neck Circumference: 46 5 cm  Nasal airway is patent  Oral airway is crowded  Mucous membranes appeared normal  RRR; Resp effort is normal  There is truncal obesity  Gait & Balance normal   Speech is clear & coherent  IMPRESSION: Primary Sleep/Secondary(to Medical or Psych conditions) & comorbidities   1  Obstructive sleep apnea on CPAP  PAP DME Resupply/Reorder   2  Restless legs syndrome  gabapentin (NEURONTIN) 300 mg capsule   3  Bilateral occipital neuralgia  gabapentin (NEURONTIN) 300 mg capsule   4  Benign essential hypertension     5  Generalized anxiety disorder     6  Obesity (BMI 30-39  9)         PLAN:  1  Treatment with  PAP is medically necessary and Tiny Brandt is agreable to continue use  2  Care of equipment, methods to improve comfort using PAP and importance of compliance with therapy were discussed  3  Pressure settin cm H2O     4  Rx provided to replace supplies and Care coordinated with DME provider  5  Strategies for weight reduction were discussed  6  Continue gabapentin - I provided a prescription for 300 mg b i d  And suggested dose reduction to 300 mg q h s     She may increase if neuralgia symptoms recur and will get refills from pain management  7  For nasal symptoms I advised regular nasal saline rinse followed by topical nasal steroid if necessary    8  Follow-up is advised in 1 year or sooner if needed to monitor progress, compliance and to adjust therapy  Thank you for allowing me to participate in the care of this patient      Sincerely,    Authenticated electronically by Victorino Melendez MD on 44/25/50   Board Certified Specialist Yes

## 2022-09-19 NOTE — PHYSICAL THERAPY INITIAL EVALUATION ADULT - ADDITIONAL COMMENTS
Spoke to emergency contact granddaughter, who further advised to speak to father (pt's son) for further info PTA. PT unable to reach son, Stas.     As per eval from 5/27, "PTA pt required assistance with all functional mobility and all ADL's provided by family. owns all DME neseccary including WC"

## 2022-09-19 NOTE — ED PROVIDER NOTE - PHYSICAL EXAMINATION
Limited 2/2 limited response and cooperation of patient  Const: not in acute distress  Eyes: no conjunctival injection  HEENT: Head NCAT, Moist MM.  Neck: Trachea midline.   CVS: +S1/S2, Peripheral pulses 2+ and equal in all extremities.  RESP: Unlabored respiratory effort. Clear to auscultation bilaterally.  GI: Nontender/Nondistended, No CVA tenderness b/l.   MSK: Normocephalic, R leg contracted, appears shorter than L. No Lower Extremities edema b/l.   Skin: Intact.   Neuro: unable to assess, patient seen moving L arm and L leg. Heel striking does not elicit any response. Limited ROM on right side, but able to externally and internally rotate without response.  Psych: Awake,

## 2022-09-19 NOTE — H&P ADULT - NSHPPHYSICALEXAM_GEN_ALL_CORE
Appearance: Normal	  HEENT:   Normal oral mucosa, PERRL, EOMI	  Lymphatic: No lymphadenopathy , no edema  Cardiovascular: Normal S1 S2, No JVD, No murmurs , Peripheral pulses palpable 2+ bilaterally  Respiratory: Lungs clear to auscultation, normal effort 	  Gastrointestinal:  Soft, Non-tender, + BS	  Skin: No rashes, No ecchymoses, No cyanosis, warm to touch  Musculoskeletal: Normal range of motion, normal strength  Psychiatry:  Mood & affect appropriate  Ext: No edema Appearance: Normal	  HEENT:   Normal oral mucosa, PERRL, EOMI	  Lymphatic: No lymphadenopathy , no edema  Cardiovascular: Normal S1 S2, No JVD, No murmurs , Peripheral pulses palpable 2+ bilaterally  Respiratory: Lungs clear to auscultation, normal effort 	  Gastrointestinal:  Soft, Non-tender, + PEG tube	  Skin: No rashes, No ecchymoses, No cyanosis, warm to touch  Musculoskeletal: Normal range of motion, normal strength  Psychiatry:  Mood & affect appropriate  Ext: No edema

## 2022-09-19 NOTE — ED ADULT NURSE REASSESSMENT NOTE - NS ED NURSE REASSESS COMMENT FT1
Patient straight cathed under sterile technique with 2 RNs at bedside. 10 ccs of clear, yellow urine drained.

## 2022-09-19 NOTE — ED ADULT NURSE REASSESSMENT NOTE - NS ED NURSE REASSESS COMMENT FT1
transport here for pt to go to US, pt agitated, and will be unable to tolerate exam, EVA Loya made aware, awaiting further instructions.

## 2022-09-19 NOTE — H&P ADULT - HISTORY OF PRESENT ILLNESS
87 year old Female with a PMHx of CVA with residual R sided weakness, dementia complicated by dysphagia s/p PEG, nonverbal, chronic atrial fibrillation off eliquis, presenting s/p fall. Patient was found down @9PM from the chair that she was placed on at 6PM. Unknown LOC or head trauma. Patient's son at bedside states unchanged mental status, but having decreased ability to walk 2/2 possible weakness. Patient unable to verbalize pain anywhere. 87 year old Female with a PMHx of CVA with residual R sided weakness, dementia complicated by dysphagia s/p PEG placent, nonverbal, chronic atrial fibrillation presenting s/p fall. Patient was found down from chair. Per son Stas, unknown if patient lost consciousness or head trauma. History obtained from Son Stas via telephone and from chart. Patient is non-verbal at baseline.

## 2022-09-19 NOTE — PHYSICAL THERAPY INITIAL EVALUATION ADULT - MANUAL MUSCLE TESTING RESULTS, REHAB EVAL
LUE grossly assessed to 3-/5, unable to formally assess 2/2 pt +inc'd agitation and dec'/grossly assessed due to

## 2022-09-19 NOTE — H&P ADULT - NSHPREVIEWOFSYSTEMS_GEN_ALL_CORE
Vital Signs Last 24 Hrs  T(C): 36.7 (19 Sep 2022 07:22), Max: 36.9 (19 Sep 2022 03:47)  T(F): 98.1 (19 Sep 2022 07:22), Max: 98.4 (19 Sep 2022 03:47)  HR: 95 (19 Sep 2022 07:22) (91 - 106)  BP: 155/82 (19 Sep 2022 07:22) (112/80 - 155/82)  BP(mean): --  RR: 18 (19 Sep 2022 07:22) (17 - 18)  SpO2: 100% (19 Sep 2022 07:22) (98% - 100%)    Parameters below as of 19 Sep 2022 07:22  Patient On (Oxygen Delivery Method): room air    REVIEW OF SYSTEMS:  Unable to obtain ROS due to clinical condition

## 2022-09-19 NOTE — PHYSICAL THERAPY INITIAL EVALUATION ADULT - PERTINENT HX OF CURRENT PROBLEM, REHAB EVAL
Pt is a 86 y/o F with PMH: CVA with residual R-sided weakness, dementia c/b dysphagia s/p PEG, nonverbal, chronic a-fib (off eliquis), presenting s/p fall. Patient was found down @9PM from the chair that she was placed on at 6PM. Unknown LOC or head trauma. Patient's son at bedside states unchanged mental status, but having decreased ability to walk 2/2 possible weakness. Hospital Course: XRAY PELVIS (9/19): (-) fracture/dislocation. XRAY R FEMUR/HIP (9/19): (-) CT HEAD (9/19): No acute intracranial hemorrhage, territorial infarct, mass effect or calvarial fracture. Multilevel degenerative changes of the cervical spine without evidence of a fracture.

## 2022-09-19 NOTE — ED ADULT NURSE REASSESSMENT NOTE - NS ED NURSE REASSESS COMMENT FT1
received report from night rn, pt has hx cva nonverbal baseline, pt unable to communicate needs. Admitted for FTT, VSS, pending bed assignment

## 2022-09-19 NOTE — ED PROVIDER NOTE - OBJECTIVE STATEMENT
87 year old Female with a PMHx of CVA with residual R sided weakness, dementia complicated by dysphagia s/p PEG, nonverbal, chronic atrial fibrillation off eliquis, presenting s/p fall. Patient was found down @9PM from the chair that she was placed on at 6PM. Unknown LOC or head trauma. Patient's son at bedside states unchanged mental status, but having decreased ability to walk 2/2 possible weakness. Patient unable to verbalize pain anywhere.

## 2022-09-20 LAB
ALBUMIN SERPL ELPH-MCNC: 3.6 G/DL — SIGNIFICANT CHANGE UP (ref 3.3–5)
ALP SERPL-CCNC: 74 U/L — SIGNIFICANT CHANGE UP (ref 40–120)
ALT FLD-CCNC: 13 U/L — SIGNIFICANT CHANGE UP (ref 10–45)
ANION GAP SERPL CALC-SCNC: 11 MMOL/L — SIGNIFICANT CHANGE UP (ref 5–17)
AST SERPL-CCNC: 18 U/L — SIGNIFICANT CHANGE UP (ref 10–40)
BILIRUB SERPL-MCNC: 0.5 MG/DL — SIGNIFICANT CHANGE UP (ref 0.2–1.2)
BUN SERPL-MCNC: 18 MG/DL — SIGNIFICANT CHANGE UP (ref 7–23)
CALCIUM SERPL-MCNC: 9.2 MG/DL — SIGNIFICANT CHANGE UP (ref 8.4–10.5)
CHLORIDE SERPL-SCNC: 104 MMOL/L — SIGNIFICANT CHANGE UP (ref 96–108)
CHOLEST SERPL-MCNC: 178 MG/DL — SIGNIFICANT CHANGE UP
CO2 SERPL-SCNC: 26 MMOL/L — SIGNIFICANT CHANGE UP (ref 22–31)
CREAT SERPL-MCNC: 0.5 MG/DL — SIGNIFICANT CHANGE UP (ref 0.5–1.3)
CULTURE RESULTS: SIGNIFICANT CHANGE UP
EGFR: 92 ML/MIN/1.73M2 — SIGNIFICANT CHANGE UP
GLUCOSE SERPL-MCNC: 108 MG/DL — HIGH (ref 70–99)
HCT VFR BLD CALC: 45.6 % — HIGH (ref 34.5–45)
HDLC SERPL-MCNC: 37 MG/DL — LOW
HGB BLD-MCNC: 14.5 G/DL — SIGNIFICANT CHANGE UP (ref 11.5–15.5)
LIPID PNL WITH DIRECT LDL SERPL: 108 MG/DL — HIGH
MAGNESIUM SERPL-MCNC: 2.1 MG/DL — SIGNIFICANT CHANGE UP (ref 1.6–2.6)
MCHC RBC-ENTMCNC: 31.8 GM/DL — LOW (ref 32–36)
MCHC RBC-ENTMCNC: 32 PG — SIGNIFICANT CHANGE UP (ref 27–34)
MCV RBC AUTO: 100.7 FL — HIGH (ref 80–100)
NON HDL CHOLESTEROL: 140 MG/DL — HIGH
NRBC # BLD: 0 /100 WBCS — SIGNIFICANT CHANGE UP (ref 0–0)
PHOSPHATE SERPL-MCNC: 3.7 MG/DL — SIGNIFICANT CHANGE UP (ref 2.5–4.5)
PLATELET # BLD AUTO: 167 K/UL — SIGNIFICANT CHANGE UP (ref 150–400)
POTASSIUM SERPL-MCNC: 4.5 MMOL/L — SIGNIFICANT CHANGE UP (ref 3.5–5.3)
POTASSIUM SERPL-SCNC: 4.5 MMOL/L — SIGNIFICANT CHANGE UP (ref 3.5–5.3)
PROT SERPL-MCNC: 7.2 G/DL — SIGNIFICANT CHANGE UP (ref 6–8.3)
RBC # BLD: 4.53 M/UL — SIGNIFICANT CHANGE UP (ref 3.8–5.2)
RBC # FLD: 12 % — SIGNIFICANT CHANGE UP (ref 10.3–14.5)
SODIUM SERPL-SCNC: 141 MMOL/L — SIGNIFICANT CHANGE UP (ref 135–145)
SPECIMEN SOURCE: SIGNIFICANT CHANGE UP
TRIGL SERPL-MCNC: 161 MG/DL — HIGH
TSH SERPL-MCNC: 2.05 UIU/ML — SIGNIFICANT CHANGE UP (ref 0.27–4.2)
WBC # BLD: 5.58 K/UL — SIGNIFICANT CHANGE UP (ref 3.8–10.5)
WBC # FLD AUTO: 5.58 K/UL — SIGNIFICANT CHANGE UP (ref 3.8–10.5)

## 2022-09-20 RX ORDER — APIXABAN 2.5 MG/1
2.5 TABLET, FILM COATED ORAL EVERY 12 HOURS
Refills: 0 | Status: DISCONTINUED | OUTPATIENT
Start: 2022-09-20 | End: 2022-09-28

## 2022-09-20 RX ORDER — APIXABAN 2.5 MG/1
2.5 TABLET, FILM COATED ORAL EVERY 12 HOURS
Refills: 0 | Status: DISCONTINUED | OUTPATIENT
Start: 2022-09-20 | End: 2022-09-20

## 2022-09-20 RX ORDER — NYSTATIN CREAM 100000 [USP'U]/G
1 CREAM TOPICAL THREE TIMES A DAY
Refills: 0 | Status: DISCONTINUED | OUTPATIENT
Start: 2022-09-20 | End: 2022-09-28

## 2022-09-20 RX ADMIN — HEPARIN SODIUM 5000 UNIT(S): 5000 INJECTION INTRAVENOUS; SUBCUTANEOUS at 05:26

## 2022-09-20 RX ADMIN — NYSTATIN CREAM 1 APPLICATION(S): 100000 CREAM TOPICAL at 21:49

## 2022-09-20 RX ADMIN — Medication 1 TABLET(S): at 12:05

## 2022-09-20 RX ADMIN — HEPARIN SODIUM 5000 UNIT(S): 5000 INJECTION INTRAVENOUS; SUBCUTANEOUS at 14:38

## 2022-09-20 RX ADMIN — Medication 125 MICROGRAM(S): at 06:07

## 2022-09-20 RX ADMIN — APIXABAN 2.5 MILLIGRAM(S): 2.5 TABLET, FILM COATED ORAL at 21:49

## 2022-09-20 RX ADMIN — MONTELUKAST 10 MILLIGRAM(S): 4 TABLET, CHEWABLE ORAL at 12:05

## 2022-09-20 RX ADMIN — Medication 1000 UNIT(S): at 12:05

## 2022-09-20 RX ADMIN — Medication 500 MILLIGRAM(S): at 12:05

## 2022-09-20 RX ADMIN — SIMVASTATIN 40 MILLIGRAM(S): 20 TABLET, FILM COATED ORAL at 21:49

## 2022-09-20 NOTE — CONSULT NOTE ADULT - SUBJECTIVE AND OBJECTIVE BOX
Neurology Consult    Reason for Consult: Patient is a 87y old  Female who presents with a chief complaint of Fall (19 Sep 2022 17:50)      HPI:  87 year old Female with a PMHx of CVA with residual R sided weakness, dementia complicated by dysphagia s/p PEG placent, nonverbal, chronic atrial fibrillation presenting s/p fall. Patient was found down from chair. Per son Stas, unknown if patient lost consciousness or head trauma. History obtained from Son Stas via telephone and from chart. Patient is non-verbal at baseline.  (19 Sep 2022 09:10)       PAST MEDICAL & SURGICAL HISTORY:  CVA (cerebral vascular accident)      PEG tube malfunction      No significant past surgical history          Allergies: Allergies    No Known Allergies    Intolerances        Social History: Denies toxic habits including tobacco, ETOH or illicit drugs.    Family History: FAMILY HISTORY:  . No family history of strokes    Medications: MEDICATIONS  (STANDING):  ascorbic acid 500 milliGRAM(s) Oral daily  cholecalciferol 1000 Unit(s) Oral daily  digoxin     Tablet 125 MICROGram(s) Oral daily  heparin   Injectable 5000 Unit(s) SubCutaneous every 8 hours  montelukast 10 milliGRAM(s) Oral daily  multivitamin 1 Tablet(s) Oral daily  simvastatin 40 milliGRAM(s) Oral at bedtime    MEDICATIONS  (PRN):      Review of Systems:  unable as patient is non verbal     Vitals:  Vital Signs Last 24 Hrs  T(C): 36.9 (20 Sep 2022 08:52), Max: 36.9 (20 Sep 2022 08:52)  T(F): 98.4 (20 Sep 2022 08:52), Max: 98.4 (20 Sep 2022 08:52)  HR: 85 (20 Sep 2022 08:52) (74 - 85)  BP: 137/80 (20 Sep 2022 08:52) (124/75 - 149/76)  BP(mean): --  RR: 16 (20 Sep 2022 08:52) (16 - 20)  SpO2: 98% (20 Sep 2022 08:52) (96% - 99%)    Parameters below as of 20 Sep 2022 08:52  Patient On (Oxygen Delivery Method): room air        General Exam:   General Appearance: Appropriately dressed and in no acute distress       Head: Normocephalic, atraumatic and no dysmorphic features  Ear, Nose, and Throat: Moist mucous membranes  CVS: S1S2+  Resp: No SOB, no wheeze or rhonchi  GI: soft NT/ND  Extremities: No edema or cyanosis  Skin: No bruises or rashes     Neurological Exam:  Mental Status: Awake, alert not following commands, minimal/no verbal   Cranial Nerves: PERRL, EOMI, VFFC, sensation V1-V3 intact, R facial  , equal elevation of palate, scm/trap 5/5, tongue is midline on protrusion. no obvious papilledema on fundoscopic exam. hearing is grossly intact.   Motor: RUE 0/5 contracter RLE 2/5; moving L antigravity   Sensation: withdraws L>R   Reflexes: 1+ throughout at biceps, brachioradialis, triceps, patellars and ankles bilaterally and equal. No clonus. R toe and L toe were both downgoing.  Coordination: unable   Gait: unable     Data/Labs/Imaging which I personally reviewed.     Labs:     CBC Full  -  ( 19 Sep 2022 02:31 )  WBC Count : 8.88 K/uL  RBC Count : 4.97 M/uL  Hemoglobin : 16.0 g/dL  Hematocrit : 48.2 %  Platelet Count - Automated : 252 K/uL  Mean Cell Volume : 97.0 fl  Mean Cell Hemoglobin : 32.2 pg  Mean Cell Hemoglobin Concentration : 33.2 gm/dL  Auto Neutrophil # : 5.62 K/uL  Auto Lymphocyte # : 2.18 K/uL  Auto Monocyte # : 0.80 K/uL  Auto Eosinophil # : 0.20 K/uL  Auto Basophil # : 0.05 K/uL  Auto Neutrophil % : 63.3 %  Auto Lymphocyte % : 24.5 %  Auto Monocyte % : 9.0 %  Auto Eosinophil % : 2.3 %  Auto Basophil % : 0.6 %        140  |  102  |  24<H>  ----------------------------<  108<H>  4.5   |  23  |  0.53    Ca    10.0      19 Sep 2022 02:31    TPro  7.9  /  Alb  4.2  /  TBili  0.3  /  DBili  x   /  AST  24  /  ALT  15  /  AlkPhos  78      LIVER FUNCTIONS - ( 19 Sep 2022 02:31 )  Alb: 4.2 g/dL / Pro: 7.9 g/dL / ALK PHOS: 78 U/L / ALT: 15 U/L / AST: 24 U/L / GGT: x             Urinalysis Basic - ( 19 Sep 2022 03:46 )    Color: Yellow / Appearance: Clear / S.030 / pH: x  Gluc: x / Ketone: Negative  / Bili: Negative / Urobili: Negative   Blood: x / Protein: 100 mg/dL / Nitrite: Negative   Leuk Esterase: Negative / RBC: 0-2 /hpf / WBC 3-5   Sq Epi: x / Non Sq Epi: Moderate / Bacteria: Few    < from: CT Cervical Spine No Cont (22 @ 02:29) >    ACC: [id number] EXAM:  CT CERVICAL SPINE                        ACC: [id number] EXAM:  CT BRAIN                          PROCEDURE DATE:  2022          INTERPRETATION:  CLINICAL INFORMATION: Unwitnessed fall on Eliquis. Found   down with head andneck trauma.    TECHNIQUE: CT of the head was performed in multiple axial sections from   the base of the skull to the vertex with coronal and sagittal reformats.    CT of the cervical spine was performed in bone and soft tissue windows   with coronal and sagittal reformats. No intravenous contrast was   administered. Beam hardening artifact slightly obscures evaluation of the   posterior fossa and brainstem.    COMPARISON: CT of the head from 3/24/2017.    FINDINGS:    Head:  Parenchymal volume loss is noted with prominent ventricles and sulci.   Chronic microvascular ischemic changes are identified. Gliosis and   encephalomalacia is again seen in the left frontal and parietal regions   with ex vacuo dilatation of the left lateral ventricle likely a sequela   of prior infarction. No acute territorial infarct is demonstrated.    There is no evidence of an acute hemorrhage or mass-effect in the   posterior fossa or in the supratentorial region. Senescent bilateral   basal ganglia calcifications are noted.    Evaluation of the osseous structures with the appropriate window appears   unremarkable. Sequela of bilateral lens surgery is seen. The visualized   paranasal sinuses and mastoid air cells are clear.    Cervical spine:  Bones: The cervical vertebral body heights and alignment are maintained.   No fracture or spondylolisthesis is demonstrated. Mild disc space   narrowing is seen at C5-C6 and C6-C7. Small marginal osteophytes are   noted from C4 to C7. Multilevel posterior disc osteophyte complexes are   seen without evidence of severe spinal canal stenosis. Multilevel neural   foraminal stenosis is noted.    Soft tissues: The prevertebral soft tissues are unremarkable. The thyroid   is heterogeneously enlarged possibly due to a goiter. A surgical clip is   seen in the posterior left upper chest soft tissues.    Lung apices: Biapical scarring is noted.    IMPRESSION:  1. No acute intracranial hemorrhage, territorial infarct, mass effect or   calvarial fracture.  2. Multilevel degenerative changes of the cervical spine without evidence   of a fracture.    --- End of Report ---            MARLENI LEVI MD; Attending Radiologist  This document has been electronically signed. Sep 19 2022  2:52AM    < end of copied text >

## 2022-09-20 NOTE — PROGRESS NOTE ADULT - SUBJECTIVE AND OBJECTIVE BOX
DATE OF SERVICE: 09-20-22 @ 16:08    Patient is a 87y old  Female who presents with a chief complaint of Fall (19 Sep 2022 17:50)      INTERVAL HISTORY: In no acute distress.     REVIEW OF SYSTEMS: Non-contributory d/t AMS  CONSTITUTIONAL: No weakness  EYES/ENT: No visual changes;  No throat pain   NECK: No pain or stiffness  RESPIRATORY: No cough, wheezing; No shortness of breath  CARDIOVASCULAR: No chest pain or palpitations  GASTROINTESTINAL: No abdominal  pain. No nausea, vomiting, or hematemesis  GENITOURINARY: No dysuria, frequency or hematuria  NEUROLOGICAL: No stroke like symptoms  SKIN: No rashes    TELEMETRY Personally reviewed: SR  	  MEDICATIONS:  digoxin     Tablet 125 MICROGram(s) Oral daily        PHYSICAL EXAM:  T(C): 36.9 (09-20-22 @ 08:52), Max: 36.9 (09-20-22 @ 08:52)  HR: 85 (09-20-22 @ 08:52) (85 - 85)  BP: 137/80 (09-20-22 @ 08:52) (137/80 - 148/73)  RR: 16 (09-20-22 @ 08:52) (16 - 18)  SpO2: 98% (09-20-22 @ 08:52) (97% - 98%)  Wt(kg): --  I&O's Summary        Appearance: In no distress	  HEENT:    PERRL, EOMI	  Cardiovascular:  S1 S2, No JVD  Respiratory: Lungs clear to auscultation	  Gastrointestinal:  Soft, Non-tender, + BS	  Vascularature:  No edema of LE  Psychiatric: Appropriate affect   Neuro: no acute focal deficits                               14.5   5.58  )-----------( 167      ( 20 Sep 2022 11:34 )             45.6     09-20    141  |  104  |  18  ----------------------------<  108<H>  4.5   |  26  |  0.50    Ca    9.2      20 Sep 2022 11:34  Phos  3.7     09-20  Mg     2.1     09-20    TPro  7.2  /  Alb  3.6  /  TBili  0.5  /  DBili  x   /  AST  18  /  ALT  13  /  AlkPhos  74  09-20        Labs personally reviewed      ASSESSMENT/PLAN: 	    87 year old Female with a PMHx of CVA with residual R sided weakness, dementia complicated by dysphagia s/p PEG placent, nonverbal, chronic atrial fibrillation presenting s/p fall. Patient was found down from chair. Per son Stas, unknown if patient lost consciousness or head trauma. History obtained from Son Stas via telephone and from chart. Patient is non-verbal at baseline.    Problem/Plan #1  Problem: s/p Fall  Plan: r/o syncope  - EKG at baseline with no ischemic changes noted  - CT Head with no acute intracranial hemorrhage infarct, mass or fracture  - Will obtain formal TTE  - Monitor on tele- currently SR  - PT consult    Problem/Plan #2  Problem: Chronic Atrial Fibrillation  Plan: c/w Digoxin  - c/w Apixaban 2.5mg PO BID    Problem/Plan #3  Problem: CVA  Plan:  - PT consult  - s/p PEG: c/w tube feedings    Problem/Plan #4  Problem: DVT PPx  Plan: c/w SQ Heparin          Hilda Benjamin, DANTE-NP   Moo Rizo DO [facility]  Cardiovascular Medicine  [address] [place] [address], [address]  Office: [phone]  Cell: [phone]

## 2022-09-20 NOTE — PROGRESS NOTE ADULT - ASSESSMENT
87 year old Female with a PMHx of CVA with residual R sided weakness, dementia complicated by dysphagia s/p PEG, nonverbal, chronic atrial fibrillation off eliquis, presenting s/p fall. Patient was found down @9PM from the chair that she was placed on at 6PM. Unknown LOC or head trauma. Patient's son at bedside states unchanged mental status, but having decreased ability to walk 2/2 possible weakness. Patient unable to verbalize pain anywhere.        Mechanical Fall  - CT Head negative for acute findings  - CT Cervical spine negative for acute fracture  - Xrays --  negative for acute fractures    - PT/OT Consult  - Check TTE  - Check Orthostatics   - Fall and Aspiration precautions  - Neuro consulted; F/u recs    Abnormal UA  - F/u Urine culture --> negative   - Monitor CBC, Temp curve, VS and patient closely   - Repeat UA as current with moderate epithelial cells    CVA  - With R sided residual weakness  - CT head negative for acute findings  - Resume PEG tube feedings    Afib  - C/w Digoxin   - C/w Zocor   - Son Unsure of Eliquis dose or if patient is on Eliquis  - Have asked Pharmacy at [phone] to follow up with Med rec    Dementia  - Fall and Aspiration precautions  - Son unsure of patient is on VPA or Trazadone. Have asked pharmacy to obtain Med rec    PPX  - PPI via PEG and DVT PPX for now  - Unsure if patient is on Elqiuis, Chacho Mcclelland is unsure. Have asked pharmacy      87 year old Female with a PMHx of CVA with residual R sided weakness, dementia complicated by dysphagia s/p PEG, nonverbal, chronic atrial fibrillation off eliquis, presenting s/p fall. Patient was found down @9PM from the chair that she was placed on at 6PM. Unknown LOC or head trauma. Patient's son at bedside states unchanged mental status, but having decreased ability to walk 2/2 possible weakness. Patient unable to verbalize pain anywhere.        Mechanical Fall  - CT Head negative for acute findings  - CT Cervical spine negative for acute fracture  - Xrays --  negative for acute fractures    - PT/OT Consult  - Check TTE  - Check Orthostatics   - Fall and Aspiration precautions  - Neuro consulted; F/u recs    Abnormal UA  - F/u Urine culture --> negative   - Monitor CBC, Temp curve, VS and patient closely   - Repeat UA as current with moderate epithelial cells    CVA  - With R sided residual weakness  - CT head negative for acute findings  - Resume PEG tube feedings    Afib  - C/w Digoxin   - C/w Zocor   - Son Unsure of Eliquis dose or if patient is on Eliquis  - Have asked Pharmacy at [phone] to follow up with Med rec  - Started on Low dose Eliquis 2.5 BID, Patient is bedbound; low risk for recurrent fall     Dementia  - Fall and Aspiration precautions  - Son unsure of patient is on VPA or Trazadone. Have asked pharmacy to obtain Med rec    PPX  - PPI via PEG and DVT PPX for now  - Unsure if patient is on Elqiuis, Son Stas is unsure. Have asked pharmacy      87 year old Female with a PMHx of CVA with residual R sided weakness, dementia complicated by dysphagia s/p PEG, nonverbal, chronic atrial fibrillation off eliquis, presenting s/p fall. Patient was found down @9PM from the chair that she was placed on at 6PM. Unknown LOC or head trauma. Patient's son at bedside states unchanged mental status, but having decreased ability to walk 2/2 possible weakness. Patient unable to verbalize pain anywhere.        Mechanical Fall  - CT Head negative for acute findings  - CT Cervical spine negative for acute fracture  - Xrays --  negative for acute fractures    - PT/OT Consult  - Check TTE  - Check Orthostatics   - Fall and Aspiration precautions  - Neuro consulted; F/u recs    Abnormal UA  - F/u Urine culture --> negative   - Monitor CBC, Temp curve, VS and patient closely   - Repeat UA as current with moderate epithelial cells    CVA  - With R sided residual weakness  - CT head negative for acute findings  - Resume PEG tube feedings    Afib  - C/w Digoxin   - C/w Zocor   - Have asked Pharmacy at [phone] to follow up with Med rec  - Started on Low dose Eliquis 2.5 BID, Patient is bedbound; low risk for recurrent fall     Dementia  - Fall and Aspiration precautions  - Son unsure of patient is on VPA or Trazadone. Have asked pharmacy to obtain Med rec    PPX  - PPI via PEG and Elqiuis 2.5 BID

## 2022-09-21 LAB
A1C WITH ESTIMATED AVERAGE GLUCOSE RESULT: 5.6 % — SIGNIFICANT CHANGE UP (ref 4–5.6)
ESTIMATED AVERAGE GLUCOSE: 114 MG/DL — SIGNIFICANT CHANGE UP (ref 68–114)

## 2022-09-21 PROCEDURE — 93306 TTE W/DOPPLER COMPLETE: CPT | Mod: 26

## 2022-09-21 RX ORDER — METOPROLOL TARTRATE 50 MG
12.5 TABLET ORAL
Refills: 0 | Status: DISCONTINUED | OUTPATIENT
Start: 2022-09-21 | End: 2022-09-28

## 2022-09-21 RX ADMIN — SIMVASTATIN 40 MILLIGRAM(S): 20 TABLET, FILM COATED ORAL at 22:59

## 2022-09-21 RX ADMIN — APIXABAN 2.5 MILLIGRAM(S): 2.5 TABLET, FILM COATED ORAL at 22:59

## 2022-09-21 RX ADMIN — Medication 125 MICROGRAM(S): at 05:41

## 2022-09-21 RX ADMIN — Medication 1000 UNIT(S): at 13:00

## 2022-09-21 RX ADMIN — NYSTATIN CREAM 1 APPLICATION(S): 100000 CREAM TOPICAL at 05:42

## 2022-09-21 RX ADMIN — Medication 12.5 MILLIGRAM(S): at 20:32

## 2022-09-21 RX ADMIN — MONTELUKAST 10 MILLIGRAM(S): 4 TABLET, CHEWABLE ORAL at 13:00

## 2022-09-21 RX ADMIN — APIXABAN 2.5 MILLIGRAM(S): 2.5 TABLET, FILM COATED ORAL at 10:16

## 2022-09-21 RX ADMIN — Medication 500 MILLIGRAM(S): at 13:00

## 2022-09-21 RX ADMIN — Medication 1 TABLET(S): at 13:00

## 2022-09-21 RX ADMIN — NYSTATIN CREAM 1 APPLICATION(S): 100000 CREAM TOPICAL at 13:34

## 2022-09-21 RX ADMIN — NYSTATIN CREAM 1 APPLICATION(S): 100000 CREAM TOPICAL at 23:00

## 2022-09-21 NOTE — DIETITIAN INITIAL EVALUATION ADULT - ENERGY INTAKE
Enteral feed at goal rate Observed tube feeding running at goal rate of  Jevity 1.2 55ml/hr X 24 hours. Current tube feeding at goal rate is providing 1320 ml total volume, 1584 calories, 73 gm protein & 1065 ml free water

## 2022-09-21 NOTE — DIETITIAN INITIAL EVALUATION ADULT - NSFNSGIIOFT_GEN_A_CORE
09-20-22 @ 07:01  -  09-21-22 @ 07:00  --------------------------------------------------------  OUT:  Total OUT: 0 mL    Total NET: 660 mL

## 2022-09-21 NOTE — PROVIDER CONTACT NOTE (OTHER) - ACTION/TREATMENT ORDERED:
DAVID Velarde notified and aware. no further interventions at this time - will continue to monitor. tele maintained

## 2022-09-21 NOTE — DIETITIAN INITIAL EVALUATION ADULT - PERTINENT LABORATORY DATA
09-20    141  |  104  |  18  ----------------------------<  108<H>  4.5   |  26  |  0.50    Ca    9.2      20 Sep 2022 11:34  Phos  3.7     09-20  Mg     2.1     09-20    TPro  7.2  /  Alb  3.6  /  TBili  0.5  /  DBili  x   /  AST  18  /  ALT  13  /  AlkPhos  74  09-20    POCT Blood Glucose.: 145 mg/dL (09-21-22 @ 07:39)    A1C with Estimated Average Glucose Result: 5.6 % (09-20-22 @ 11:34)

## 2022-09-21 NOTE — DIETITIAN INITIAL EVALUATION ADULT - ADD RECOMMEND
- Continue micronutrient supplementation as long as it's deemed medically appropriate   - Will continue to monitor weight, labs, skin, GI status  - Monitor GI tolerance. RD to remain available to adjust EN formulary, volume/rate PRN.   - RD remains available to review diet education and adjust diet recommendations as needed.

## 2022-09-21 NOTE — DIETITIAN INITIAL EVALUATION ADULT - OTHER INFO
Current Weight: 45 kg/ 99.2 pounds (9/21/22) IBW:  100 pounds  %IBW: 99.2%  Weight History:  47.6 kg/104.7 pounds (9/18/22), 43.1 kg/94.8 pounds (5/24/22; 4/23/21)   Weight trend: Wt has been trending from ~ pounds over the past 1 1/2 years

## 2022-09-21 NOTE — DIETITIAN INITIAL EVALUATION ADULT - ORAL INTAKE PTA/DIET HISTORY
- pt non verbal, RD unable to interview at this time; Pt AOx0  - per last RD note 5/23/22 pt was receiving jevity 1.2 bolus feeding x4 (237ml)/day

## 2022-09-21 NOTE — DIETITIAN INITIAL EVALUATION ADULT - REASON FOR ADMISSION
Chart Reviewed, Events noted:   87 year old Female with a PMHx of CVA with residual R sided weakness, dementia complicated by dysphagia s/p PEG, nonverbal, chronic atrial fibrillation off eliquis, presenting s/p fall. Patient was found down @9PM from the chair that she was placed on at 6PM. Unknown LOC or head trauma.

## 2022-09-21 NOTE — PROGRESS NOTE ADULT - SUBJECTIVE AND OBJECTIVE BOX
Name of Patient : CLARISA DAWN  MRN: [id number]  Date of visit: 09-21-22 @ 16:54      Subjective: Patient seen and examined. No new events except as noted.   Patient seen earlier this AM. Lying down in bed  Started on Lopressor 12.5 BID     REVIEW OF SYSTEMS:  Unable to obtain ROS    MEDICATIONS:  MEDICATIONS  (STANDING):  apixaban 2.5 milliGRAM(s) Oral every 12 hours  ascorbic acid 500 milliGRAM(s) Oral daily  cholecalciferol 1000 Unit(s) Oral daily  digoxin     Tablet 125 MICROGram(s) Oral daily  metoprolol tartrate 12.5 milliGRAM(s) Oral two times a day  montelukast 10 milliGRAM(s) Oral daily  multivitamin 1 Tablet(s) Oral daily  nystatin Powder 1 Application(s) Topical three times a day  simvastatin 40 milliGRAM(s) Oral at bedtime      PHYSICAL EXAM:  T(C): 36.7 (09-21-22 @ 16:14), Max: 37.2 (09-21-22 @ 08:41)  HR: 94 (09-21-22 @ 16:14) (79 - 94)  BP: 119/66 (09-21-22 @ 16:14) (119/66 - 148/80)  RR: 18 (09-21-22 @ 16:14) (17 - 18)  SpO2: 97% (09-21-22 @ 16:14) (96% - 100%)  Wt(kg): --  I&O's Summary    20 Sep 2022 07:01  -  21 Sep 2022 07:00  --------------------------------------------------------  IN: 720 mL / OUT: 0 mL / NET: 720 mL          Appearance: Awake	  HEENT: Eyes are open   Lymphatic: No lymphadenopathy   Cardiovascular: Normal S1 S2, no JVD  Respiratory: normal effort , clear  Gastrointestinal:  Soft, +PEG   Skin: No rashes,  warm to touch  Psychiatry:  Calm, unable to asses   Musculoskeletal: No edema              09-20-22 @ 07:01  -  09-21-22 @ 07:00  --------------------------------------------------------  IN: 720 mL / OUT: 0 mL / NET: 720 mL                                  14.5   5.58  )-----------( 167      ( 20 Sep 2022 11:34 )             45.6               09-20    141  |  104  |  18  ----------------------------<  108<H>  4.5   |  26  |  0.50    Ca    9.2      20 Sep 2022 11:34  Phos  3.7     09-20  Mg     2.1     09-20    TPro  7.2  /  Alb  3.6  /  TBili  0.5  /  DBili  x   /  AST  18  /  ALT  13  /  AlkPhos  74  09-20                         < from: Transthoracic Echocardiogram (09.21.22 @ 11:06) >    Patient name: CLARISA DAWN  YOB: 1935   Age: 87 (F)   MR#: [id number]  Study Date: 9/21/2022  [facility]  Study quality: difficult due to patient being  Referring Physician: Carter Lowry MD  Blood Pressure: 121/78 mmHg  Height: 152 cm  Weight: 47 kg  BSA: 1.4 m2  ------------------------------------------------------------------------  PROCEDURE: Transthoracic echocardiogram with 2-D, M-Mode  and complete spectral and color flow Doppler.  INDICATION: Syncope and collapse (R55)  ------------------------------------------------------------------------  Dimensions:    Normal Values:  LA:     4.4    2.0 - 4.0 cm  Ao:     3.1    2.0 - 3.8 cm  SEPTUM: 0.9    0.6 - 1.2 cm  PWT:    0.9    0.6 -1.1 cm  LVIDd:  4.0    3.0 - 5.6 cm  LVIDs:  2.3    1.8 - 4.0 cm  Derived variables:  LVMI: 77 g/m2  RWT: 0.45  Fractional short: 43 %  EF (Visual Estimate): 65-70 %  ------------------------------------------------------------------------  Observations:  Mitral Valve: Mitral annular calcification, otherwise  normal mitral valve.  Aortic Valve/Aorta: Calcified trileaflet aortic valve with  normal opening. Unable to obtain gradients across the  aortic valve.  Mild aortic regurgitation.  Aortic Root: 3.1 cm.  Left Ventricle: Normal left ventricular systolic function.  No segmental wall motion abnormalities. Interpretation  based on parasternal views only.  Normal left ventricular  internal dimensions and wall thicknesses. Unable to  evaluate diastolic function  Right Heart: The right ventricle is not well visualized.  Normal tricuspid valve. Minimal tricuspid regurgitation.  Normal pulmonic valve. Mild pulmonic regurgitation.  Pericardium/Pleura: Normal pericardium with no pericardial  effusion.  ------------------------------------------------------------------------  Conclusions:  1. Calcified trileaflet aortic valve with normal opening.  Unable to obtain gradients across the aortic valve.  Mild  aortic regurgitation.  2. Normal left ventricular systolic function. No segmental  wall motion abnormalities. Interpretation based on  parasternal views only.  TTE study terminated early at the request of the patient.  *** No previous Echo exam.    < end of copied text >

## 2022-09-21 NOTE — DIETITIAN INITIAL EVALUATION ADULT - ENTERAL
As tolerated, continue Jevity 1.2 @ 55mL/hr  x 24 hrs is reached. Regimen at goal provides 1320mL total volume, 1065 mL free water, 1584 kcal/d and 73 g pro/day (35.2 kcal/kg and 1.49 g/kg) based on current weight 99.2 pounds/45 kg . Defer pt. fluid needs to medical team.

## 2022-09-21 NOTE — DIETITIAN INITIAL EVALUATION ADULT - NS FNS DIET ORDER
Diet, NPO with Tube Feed:   Tube Feeding Modality: Jejunostomy  Jevity 1.2 George (JEVITY1.2RTH)  Total Volume for 24 Hours (mL): 1320  Continuous  Starting Tube Feed Rate {mL per Hour}: 10  Increase Tube Feed Rate by (mL): 10     Every 4 hours  Until Goal Tube Feed Rate (mL per Hour): 55  Tube Feed Duration (in Hours): 24  Tube Feed Start Time: 16:00 (09-19-22 @ 16:08) [Active]

## 2022-09-21 NOTE — DIETITIAN INITIAL EVALUATION ADULT - PERTINENT MEDS FT
MEDICATIONS  (STANDING):  apixaban 2.5 milliGRAM(s) Oral every 12 hours  ascorbic acid 500 milliGRAM(s) Oral daily  cholecalciferol 1000 Unit(s) Oral daily  digoxin     Tablet 125 MICROGram(s) Oral daily  montelukast 10 milliGRAM(s) Oral daily  multivitamin 1 Tablet(s) Oral daily  nystatin Powder 1 Application(s) Topical three times a day  simvastatin 40 milliGRAM(s) Oral at bedtime    MEDICATIONS  (PRN):

## 2022-09-21 NOTE — DIETITIAN INITIAL EVALUATION ADULT - REASON
Unable to obtain consent for NFPE secondary to pt. being A&OX0, dx of dementia. NFPE defer to a later time.

## 2022-09-21 NOTE — DIETITIAN INITIAL EVALUATION ADULT - OTHER CALCULATIONS
Current weight of 99.2  pounds used for calorie and protein needs calculation; Defer fluid needs to medical team

## 2022-09-21 NOTE — GOALS OF CARE CONVERSATION - ADVANCED CARE PLANNING - CONVERSATION DETAILS
[place]  Lay [id number] conducted call via phone  Due to capacity dementia spoke with son Stas.  Introduced self and role of the PCE for goals of care conversation. HCP documents found on patient window search date 2/21/2017 are invalid without pt signature or witness.  Stas reports he completed HCP forms about 5 years ago.  Asked him to provide forms if he can locate for medical records.  Reports no spouse or other children. He is  and has a daughter and he is the only decision maker for pt currently.  He is overwhelmed with his role as caregiver and wants his mother to go to a nursing home.  CM is aware and noted in chart notes.      CPR/ intubation procedures and possible complications explained.  As stated by son due to mothers condition and age he would forgo medical procedures like CPR and Intubation to prolong his mothers life.  He does not see the quality of life for her anymore.  Introduced MOLST and importance of discussing choice for DNR DNI with medical team.  ACP made aware of discussion, will follow up for [place] documents if available.  Son informed with each medical visit to tell the medical team the same limitations in care.    Contact information for further needs provided.   No [employer] exemptions noted. Code YOZI provided and care giver center information.       Griselda Ojeda RN  Palliative Care Educator  [phone] cell

## 2022-09-21 NOTE — DIETITIAN INITIAL EVALUATION ADULT - REASON INDICATOR FOR ASSESSMENT
Consult received for Tube feeding recommendations   Information obtained from review of pt. current medical record and previous admission RD note; pt non verbal AOx0 with diagnosis of dementia. Attempt to contact  pt's emergency contact (Eve, Granddaughter [phone]), who did not answer when RD called

## 2022-09-22 LAB
ANION GAP SERPL CALC-SCNC: 8 MMOL/L — SIGNIFICANT CHANGE UP (ref 5–17)
BUN SERPL-MCNC: 17 MG/DL — SIGNIFICANT CHANGE UP (ref 7–23)
CALCIUM SERPL-MCNC: 9.1 MG/DL — SIGNIFICANT CHANGE UP (ref 8.4–10.5)
CHLORIDE SERPL-SCNC: 104 MMOL/L — SIGNIFICANT CHANGE UP (ref 96–108)
CO2 SERPL-SCNC: 25 MMOL/L — SIGNIFICANT CHANGE UP (ref 22–31)
CREAT SERPL-MCNC: 0.46 MG/DL — LOW (ref 0.5–1.3)
EGFR: 94 ML/MIN/1.73M2 — SIGNIFICANT CHANGE UP
GLUCOSE SERPL-MCNC: 138 MG/DL — HIGH (ref 70–99)
MAGNESIUM SERPL-MCNC: 2.1 MG/DL — SIGNIFICANT CHANGE UP (ref 1.6–2.6)
PHOSPHATE SERPL-MCNC: 3.5 MG/DL — SIGNIFICANT CHANGE UP (ref 2.5–4.5)
POTASSIUM SERPL-MCNC: 4.2 MMOL/L — SIGNIFICANT CHANGE UP (ref 3.5–5.3)
POTASSIUM SERPL-SCNC: 4.2 MMOL/L — SIGNIFICANT CHANGE UP (ref 3.5–5.3)
SARS-COV-2 RNA SPEC QL NAA+PROBE: SIGNIFICANT CHANGE UP
SODIUM SERPL-SCNC: 137 MMOL/L — SIGNIFICANT CHANGE UP (ref 135–145)

## 2022-09-22 RX ADMIN — Medication 1000 UNIT(S): at 12:48

## 2022-09-22 RX ADMIN — Medication 500 MILLIGRAM(S): at 12:48

## 2022-09-22 RX ADMIN — Medication 125 MICROGRAM(S): at 06:24

## 2022-09-22 RX ADMIN — APIXABAN 2.5 MILLIGRAM(S): 2.5 TABLET, FILM COATED ORAL at 08:53

## 2022-09-22 RX ADMIN — NYSTATIN CREAM 1 APPLICATION(S): 100000 CREAM TOPICAL at 23:25

## 2022-09-22 RX ADMIN — Medication 1 TABLET(S): at 12:48

## 2022-09-22 RX ADMIN — MONTELUKAST 10 MILLIGRAM(S): 4 TABLET, CHEWABLE ORAL at 12:48

## 2022-09-22 RX ADMIN — NYSTATIN CREAM 1 APPLICATION(S): 100000 CREAM TOPICAL at 12:47

## 2022-09-22 RX ADMIN — SIMVASTATIN 40 MILLIGRAM(S): 20 TABLET, FILM COATED ORAL at 23:24

## 2022-09-22 RX ADMIN — Medication 12.5 MILLIGRAM(S): at 17:09

## 2022-09-22 RX ADMIN — Medication 12.5 MILLIGRAM(S): at 06:24

## 2022-09-22 RX ADMIN — NYSTATIN CREAM 1 APPLICATION(S): 100000 CREAM TOPICAL at 06:24

## 2022-09-22 RX ADMIN — APIXABAN 2.5 MILLIGRAM(S): 2.5 TABLET, FILM COATED ORAL at 23:20

## 2022-09-22 NOTE — PROGRESS NOTE ADULT - SUBJECTIVE AND OBJECTIVE BOX
Name of Patient : CLARISA DAWN  MRN: [id number]  Date of visit: 09-22-22 @ 14:07      Subjective: Patient seen and examined. No new events except as noted.   Patient seen earlier this AM. Lying down in bed. Does not appear to be in distress    REVIEW OF SYSTEMS:  Unable to obtain ROS     MEDICATIONS:  MEDICATIONS  (STANDING):  apixaban 2.5 milliGRAM(s) Oral every 12 hours  ascorbic acid 500 milliGRAM(s) Oral daily  cholecalciferol 1000 Unit(s) Oral daily  digoxin     Tablet 125 MICROGram(s) Oral daily  metoprolol tartrate 12.5 milliGRAM(s) Oral two times a day  montelukast 10 milliGRAM(s) Oral daily  multivitamin 1 Tablet(s) Oral daily  nystatin Powder 1 Application(s) Topical three times a day  simvastatin 40 milliGRAM(s) Oral at bedtime      PHYSICAL EXAM:  T(C): 36 (09-22-22 @ 08:39), Max: 36.8 (09-22-22 @ 00:04)  HR: 78 (09-22-22 @ 08:39) (62 - 94)  BP: 143/80 (09-22-22 @ 08:39) (119/66 - 144/82)  RR: 18 (09-22-22 @ 08:39) (18 - 18)  SpO2: 96% (09-22-22 @ 08:39) (96% - 97%)  Wt(kg): --  I&O's Summary    21 Sep 2022 07:01  -  22 Sep 2022 07:00  --------------------------------------------------------  IN: 800 mL / OUT: 0 mL / NET: 800 mL          Appearance: Awake	  HEENT: Eyes are open   Lymphatic: No lymphadenopathy   Cardiovascular: Normal S1 S2, no JVD  Respiratory: normal effort , clear  Gastrointestinal:  Soft, +PEG   Skin: No rashes,  warm to touch  Psychiatry:  Calm, unable to asses   Musculoskeletal: No edema          09-21-22 @ 07:01  -  09-22-22 @ 07:00  --------------------------------------------------------  IN: 800 mL / OUT: 0 mL / NET: 800 mL                        09-22    137  |  104  |  17  ----------------------------<  138<H>  4.2   |  25  |  0.46<L>    Ca    9.1      22 Sep 2022 11:17  Phos  3.5     09-22  Mg     2.1     09-22      Culture - Urine (09.19.22 @ 03:46)   Specimen Source: Catheterized Catheterized   Culture Results:   <10,000 CFU/mL Normal Urogenital Coreen

## 2022-09-22 NOTE — PROGRESS NOTE ADULT - ASSESSMENT
87 year old Female with a PMHx of CVA with residual R sided weakness, dementia complicated by dysphagia s/p PEG, nonverbal, chronic atrial fibrillation off eliquis, presenting s/p fall. Patient was found down @9PM from the chair that she was placed on at 6PM. Unknown LOC or head trauma. Patient's son at bedside states unchanged mental status, but having decreased ability to walk 2/2 possible weakness. Patient unable to verbalize pain anywhere.      Mechanical Fall  - CT Head negative for acute findings  - CT Cervical spine negative for acute fracture  - Xrays --  negative for acute fractures    - PT/OT Consult  - Check TTE -- As noted wit EF of 65-70%  - Check Orthostatics   - Fall and Aspiration precautions  - Neuro consulted; F/u recs  - Pt will require outpatient follow up with Neuro as an outpatient for rEEG     Abnormal UA  - F/u Urine culture --> negative   - Monitor CBC, Temp curve, VS and patient closely     CVA  - With R sided residual weakness  - CT head negative for acute findings  - C/w PEG tube feedings    Afib  - C/w Digoxin   - C/w Zocor   - Have asked Pharmacy at [phone] to follow up with Med rec  - C/w Low dose Eliquis 2.5 BID, Patient is bedbound; low risk for recurrent fall   - C/w lopressor 12.5 BID; monitor patient closely    Dementia  - Fall and Aspiration precautions  - Son unsure of patient is on VPA or Trazadone. Have asked pharmacy to obtain Med rec    PPX  - PPI via PEG and Elqiuis 2.5 BID     D/C planning underway awaiting placement/ authorization.   DC planning

## 2022-09-22 NOTE — PROGRESS NOTE ADULT - ASSESSMENT
87 year old  Female with a  prior stroke with residual R sided weakness, chronic AF dementia complicated by dysphagia s/p PEG, nonverbal, chronic atrial fibrillation presenting s/p fall.    CTH: volume loss. L frontal and pareital gliosis   CT C spine: no fracture. multilevel degenerative ichagnes   UA+  TTE as above     A1c 5.6  TSH 2.05    Impression: 1) Chronic Stroke embolic stroke likely from AF 2) fall unclear etiology   - rule out infection. f/u urine cx   - should be on AC daily and statin therapy (on simva 40) for secondary stroke prevention if no CI   - unclear why was not on AC; should at least be on asa if unable for AC --> eliquis 2.5mg BID started   - cardiac workup in progress for syncope.  tele/TTE  - can check B12, RPR, TSH(WNL)  - unclear if LOC.  can get rEEG in or outpatient  - check orthostatics    - PT/OT   - check FS, glucose control <180  - GI/DVT ppx  - Thank you for allowing me to participate in the care of this patient. Call with questions.   Corona Francois MD  Vascular Neurology  Office: [phone]

## 2022-09-22 NOTE — PROGRESS NOTE ADULT - SUBJECTIVE AND OBJECTIVE BOX
DATE OF SERVICE: 09-22-22 @ 15:29    Patient is a 87y old  Female who presents with a chief complaint of Chart Reviewed, Events noted:   87 year old Female with a PMHx of CVA with residual R sided weakness, dementia complicated by dysphagia s/p PEG, nonverbal, chronic atrial fibrillation off eliquis, presenting s/p fall. Patient was found down @9PM from the chair that she was placed on at 6PM. Unknown LOC or head trauma.     INTERVAL HISTORY: Feels ok.     REVIEW OF SYSTEMS:  CONSTITUTIONAL: No weakness  EYES/ENT: No visual changes;  No throat pain   NECK: No pain or stiffness  RESPIRATORY: No cough, wheezing; No shortness of breath  CARDIOVASCULAR: No chest pain or palpitations  GASTROINTESTINAL: No abdominal  pain. No nausea, vomiting, or hematemesis  GENITOURINARY: No dysuria, frequency or hematuria  NEUROLOGICAL: No stroke like symptoms  SKIN: No rashes      	  MEDICATIONS:  digoxin Tablet 125 MICROGram(s) Oral daily  metoprolol tartrate 12.5 milliGRAM(s) Oral two times a day        PHYSICAL EXAM:  T(C): 36 (09-22-22 @ 08:39), Max: 36.8 (09-22-22 @ 00:04)  HR: 78 (09-22-22 @ 08:39) (62 - 94)  BP: 143/80 (09-22-22 @ 08:39) (119/66 - 144/82)  RR: 18 (09-22-22 @ 08:39) (18 - 18)  SpO2: 96% (09-22-22 @ 08:39) (96% - 97%)  Wt(kg): --  I&O's Summary    21 Sep 2022 07:01  -  22 Sep 2022 07:00  --------------------------------------------------------  IN: 800 mL / OUT: 0 mL / NET: 800 mL          Appearance: In no distress	  HEENT:    PERRL, EOMI	  Cardiovascular:  S1 S2, No JVD  Respiratory: Lungs clear to auscultation	  Gastrointestinal:  Soft, Non-tender, + BS	  Vascularature:  No edema of LE  Psychiatric: Appropriate affect   Neuro: no acute focal deficits           09-22    137  |  104  |  17  ----------------------------<  138<H>  4.2   |  25  |  0.46<L>    Ca    9.1      22 Sep 2022 11:17  Phos  3.5     09-22  Mg     2.1     09-22          Labs personally reviewed    Transthoracic Echocardiogram (09.21.22 @ 11:06) >  EF (Visual Estimate): 65-70 %  ------------------------------------------------------------------------  Observations:  Mitral Valve: Mitral annular calcification, otherwise  normal mitral valve.  Aortic Valve/Aorta: Calcified trileaflet aortic valve with  normal opening. Unable to obtain gradients across the  aortic valve.  Mild aortic regurgitation.  Aortic Root: 3.1 cm.  Left Ventricle: Normal left ventricular systolic function.  No segmental wall motion abnormalities. Interpretation  based on parasternal views only.  Normal left ventricular  internal dimensions and wall thicknesses. Unable to  evaluate diastolic function  Right Heart: The right ventricle is not well visualized.  Normal tricuspid valve. Minimal tricuspid regurgitation.  Normal pulmonic valve. Mild pulmonic regurgitation.  Pericardium/Pleura: Normal pericardium with no pericardial  effusion.  ------------------------------------------------------------------------  Conclusions:  1. Calcified trileaflet aortic valve with normal opening.  Unable to obtain gradients across the aortic valve.  Mild  aortic regurgitation.  2. Normal left ventricular systolic function. No segmental  wall motion abnormalities. Interpretation based on  parasternal views only.  TTE study terminated early at the request of the patient.  *** No previous Echo exam.      ASSESSMENT/PLAN: 	    87 year old Female with a PMHx of CVA with residual R sided weakness, dementia complicated by dysphagia s/p PEG placent, nonverbal, chronic atrial fibrillation presenting s/p fall. Patient was found down from chair. Per son Stas, unknown if patient lost consciousness or head trauma. History obtained from Chacho Mcclelland via telephone and from chart. Patient is non-verbal at baseline.    Problem/Plan #1  Problem: s/p Fall  Plan: r/o syncope  - EKG at baseline with no ischemic changes noted  - CT Head with no acute intracranial hemorrhage infarct, mass or fracture  - TTE shows preserved EF, normal LV systolic function, no WMA  - Monitor on tele- currently SR  - PT consult    Problem/Plan #2  Problem: Chronic Atrial Fibrillation  Plan: c/w Digoxin  - c/w Apixaban 2.5mg PO BID  - c/w Metoprolol 12.5mg PO BID    Problem/Plan #3  Problem: CVA  Plan:  - PT consult  - s/p PEG: c/w tube feedings    Problem/Plan #4  Problem: DVT PPx  Plan: c/w SQ Heparin      Hilda Benjamin, AG-NP   Moo Rizo DO [facility]  Cardiovascular Medicine  [address] [place] [address], [address]  Available through call or text on [employer]  Office: [phone]

## 2022-09-22 NOTE — PROGRESS NOTE ADULT - SUBJECTIVE AND OBJECTIVE BOX
Neurology Progress Note    S: Patient seen and examined. No new events overnight. patient denied CP, SOB, HA or pain.     Medication:  apixaban 2.5 milliGRAM(s) Oral every 12 hours  ascorbic acid 500 milliGRAM(s) Oral daily  cholecalciferol 1000 Unit(s) Oral daily  digoxin     Tablet 125 MICROGram(s) Oral daily  metoprolol tartrate 12.5 milliGRAM(s) Oral two times a day  montelukast 10 milliGRAM(s) Oral daily  multivitamin 1 Tablet(s) Oral daily  nystatin Powder 1 Application(s) Topical three times a day  simvastatin 40 milliGRAM(s) Oral at bedtime      Vitals:  Vital Signs Last 24 Hrs  T(C): 36 (22 Sep 2022 08:39), Max: 36.8 (22 Sep 2022 00:04)  T(F): 96.8 (22 Sep 2022 08:39), Max: 98.2 (22 Sep 2022 00:04)  HR: 78 (22 Sep 2022 08:39) (62 - 94)  BP: 143/80 (22 Sep 2022 08:39) (119/66 - 144/82)  BP(mean): --  RR: 18 (22 Sep 2022 08:39) (18 - 18)  SpO2: 96% (22 Sep 2022 08:39) (96% - 97%)    Parameters below as of 22 Sep 2022 08:39  Patient On (Oxygen Delivery Method): room air        General Exam:   General Appearance: Appropriately dressed and in no acute distress       Head: Normocephalic, atraumatic and no dysmorphic features  Ear, Nose, and Throat: Moist mucous membranes  CVS: S1S2+  Resp: No SOB, no wheeze or rhonchi  GI: soft NT/ND  Extremities: No edema or cyanosis  Skin: No bruises or rashes     Neurological Exam:  Mental Status: Awake, alert not following commands, minimal/no verbal   Cranial Nerves: PERRL, EOMI, VFFC, sensation V1-V3 intact, R facial  , equal elevation of palate, scm/trap 5/5, tongue is midline on protrusion. no obvious papilledema on fundoscopic exam. hearing is grossly intact.   Motor: RUE 0/5 contracter RLE 2/5; moving L antigravity   Sensation: withdraws L>R   Reflexes: 1+ throughout at biceps, brachioradialis, triceps, patellars and ankles bilaterally and equal. No clonus. R toe and L toe were both downgoing.  Coordination: unable   Gait: unable     I personally reviewed the below data/images/labs:      CBC Full  -  ( 20 Sep 2022 11:34 )  WBC Count : 5.58 K/uL  RBC Count : 4.53 M/uL  Hemoglobin : 14.5 g/dL  Hematocrit : 45.6 %  Platelet Count - Automated : 167 K/uL  Mean Cell Volume : 100.7 fl  Mean Cell Hemoglobin : 32.0 pg  Mean Cell Hemoglobin Concentration : 31.8 gm/dL  Auto Neutrophil # : x  Auto Lymphocyte # : x  Auto Monocyte # : x  Auto Eosinophil # : x  Auto Basophil # : x  Auto Neutrophil % : x  Auto Lymphocyte % : x  Auto Monocyte % : x  Auto Eosinophil % : x  Auto Basophil % : x    09-20    141  |  104  |  18  ----------------------------<  108<H>  4.5   |  26  |  0.50    Ca    9.2      20 Sep 2022 11:34  Phos  3.7     09-20  Mg     2.1     09-20    TPro  7.2  /  Alb  3.6  /  TBili  0.5  /  DBili  x   /  AST  18  /  ALT  13  /  AlkPhos  74  09-20    LIVER FUNCTIONS - ( 20 Sep 2022 11:34 )  Alb: 3.6 g/dL / Pro: 7.2 g/dL / ALK PHOS: 74 U/L / ALT: 13 U/L / AST: 18 U/L / GGT: x             ACC: [id number] EXAM:  CT CERVICAL SPINE                        ACC: [id number] EXAM:  CT BRAIN                          PROCEDURE DATE:  09/19/2022          INTERPRETATION:  CLINICAL INFORMATION: Unwitnessed fall on Eliquis. Found   down with head andneck trauma.    TECHNIQUE: CT of the head was performed in multiple axial sections from   the base of the skull to the vertex with coronal and sagittal reformats.    CT of the cervical spine was performed in bone and soft tissue windows   with coronal and sagittal reformats. No intravenous contrast was   administered. Beam hardening artifact slightly obscures evaluation of the   posterior fossa and brainstem.    COMPARISON: CT of the head from 3/24/2017.    FINDINGS:    Head:  Parenchymal volume loss is noted with prominent ventricles and sulci.   Chronic microvascular ischemic changes are identified. Gliosis and   encephalomalacia is again seen in the left frontal and parietal regions   with ex vacuo dilatation of the left lateral ventricle likely a sequela   of prior infarction. No acute territorial infarct is demonstrated.    There is no evidence of an acute hemorrhage or mass-effect in the   posterior fossa or in the supratentorial region. Senescent bilateral   basal ganglia calcifications are noted.    Evaluation of the osseous structures with the appropriate window appears   unremarkable. Sequela of bilateral lens surgery is seen. The visualized   paranasal sinuses and mastoid air cells are clear.    Cervical spine:  Bones: The cervical vertebral body heights and alignment are maintained.   No fracture or spondylolisthesis is demonstrated. Mild disc space   narrowing is seen at C5-C6 and C6-C7. Small marginal osteophytes are   noted from C4 to C7. Multilevel posterior disc osteophyte complexes are   seen without evidence of severe spinal canal stenosis. Multilevel neural   foraminal stenosis is noted.    Soft tissues: The prevertebral soft tissues are unremarkable. The thyroid   is heterogeneously enlarged possibly due to a goiter. A surgical clip is   seen in the posterior left upper chest soft tissues.    Lung apices: Biapical scarring is noted.    IMPRESSION:  1. No acute intracranial hemorrhage, territorial infarct, mass effect or   calvarial fracture.  2. Multilevel degenerative changes of the cervical spine without evidence   of a fracture.    --- End of Report ---            MARLENI LEVI MD; Attending Radiologist  This document has been electronically signed. Sep 19 2022  2:52AM    < end of copied text >    < from: Transthoracic Echocardiogram (09.21.22 @ 11:06) >    Patient name: CLARISA DAWN  YOB: 1935   Age: 87 (F)   MR#: [id number]  Study Date: 9/21/2022  [facility] RDCS  Study quality: difficult due to patient being  Referring Physician: Carter Lowry MD  Blood Pressure: 121/78 mmHg  Height: 152 cm  Weight: 47 kg  BSA: 1.4 m2  ------------------------------------------------------------------------  PROCEDURE: Transthoracic echocardiogram with 2-D, M-Mode  and complete spectral and color flow Doppler.  INDICATION: Syncope and collapse (R55)  ------------------------------------------------------------------------  Dimensions:    Normal Values:  LA:     4.4    2.0 - 4.0 cm  Ao:     3.1    2.0 - 3.8 cm  SEPTUM: 0.9    0.6 - 1.2 cm  PWT:    0.9    0.6 -1.1 cm  LVIDd:  4.0    3.0 - 5.6 cm  LVIDs:  2.3    1.8 - 4.0 cm  Derived variables:  LVMI: 77 g/m2  RWT: 0.45  Fractional short: 43 %  EF (Visual Estimate): 65-70 %  ------------------------------------------------------------------------  Observations:  Mitral Valve: Mitral annular calcification, otherwise  normal mitral valve.  Aortic Valve/Aorta: Calcified trileaflet aortic valve with  normal opening. Unable to obtain gradients across the  aortic valve.  Mild aortic regurgitation.  Aortic Root: 3.1 cm.  Left Ventricle: Normal left ventricular systolic function.  No segmental wall motion abnormalities. Interpretation  based on parasternal views only.  Normal left ventricular  internal dimensions and wall thicknesses. Unable to  evaluate diastolic function  Right Heart: The right ventricle is not well visualized.  Normal tricuspid valve. Minimal tricuspid regurgitation.  Normal pulmonic valve. Mild pulmonic regurgitation.  Pericardium/Pleura: Normal pericardium with no pericardial  effusion.  ------------------------------------------------------------------------  Conclusions:  1. Calcified trileaflet aortic valve with normal opening.  Unable to obtain gradients across the aortic valve.  Mild  aortic regurgitation.  2. Normal left ventricular systolic function. No segmental  wall motion abnormalities. Interpretation based on  parasternal views only.  TTE study terminated early at the request of the patient.  *** No previous Echo exam.  ------------------------------------------------------------------------  Confirmed on  9/21/2022 - 14:31:23 by Denny Scott M.D.  ------------------------------------------------------------------------    < end of copied text >

## 2022-09-23 ENCOUNTER — TRANSCRIPTION ENCOUNTER (OUTPATIENT)
Age: 87
End: 2022-09-23

## 2022-09-23 RX ORDER — APIXABAN 2.5 MG/1
1 TABLET, FILM COATED ORAL
Qty: 0 | Refills: 0 | DISCHARGE
Start: 2022-09-23

## 2022-09-23 RX ADMIN — Medication 1000 UNIT(S): at 12:37

## 2022-09-23 RX ADMIN — NYSTATIN CREAM 1 APPLICATION(S): 100000 CREAM TOPICAL at 21:31

## 2022-09-23 RX ADMIN — APIXABAN 2.5 MILLIGRAM(S): 2.5 TABLET, FILM COATED ORAL at 21:31

## 2022-09-23 RX ADMIN — NYSTATIN CREAM 1 APPLICATION(S): 100000 CREAM TOPICAL at 14:44

## 2022-09-23 RX ADMIN — Medication 12.5 MILLIGRAM(S): at 18:56

## 2022-09-23 RX ADMIN — MONTELUKAST 10 MILLIGRAM(S): 4 TABLET, CHEWABLE ORAL at 12:38

## 2022-09-23 RX ADMIN — Medication 500 MILLIGRAM(S): at 12:37

## 2022-09-23 RX ADMIN — NYSTATIN CREAM 1 APPLICATION(S): 100000 CREAM TOPICAL at 05:17

## 2022-09-23 RX ADMIN — Medication 1 TABLET(S): at 12:38

## 2022-09-23 RX ADMIN — Medication 125 MICROGRAM(S): at 05:17

## 2022-09-23 RX ADMIN — SIMVASTATIN 40 MILLIGRAM(S): 20 TABLET, FILM COATED ORAL at 21:31

## 2022-09-23 RX ADMIN — APIXABAN 2.5 MILLIGRAM(S): 2.5 TABLET, FILM COATED ORAL at 08:16

## 2022-09-23 RX ADMIN — Medication 12.5 MILLIGRAM(S): at 05:17

## 2022-09-23 NOTE — DISCHARGE NOTE PROVIDER - NSDCMRMEDTOKEN_GEN_ALL_CORE_FT
apixaban 2.5 mg oral tablet: 1 tab(s) orally every 12 hours  digoxin 125 mcg (0.125 mg) oral tablet: 1 tab(s) orally once a day  multivitamin: 1 tab(s) orally once a day  Singulair 10 mg oral tablet: 1 tab(s) orally once a day    Note:No record with pharmacy  traZODone 50 mg oral tablet: 1 tab(s) orally once a day (at bedtime)\    Note:No record with pharmacy  valproic acid 250 mg/5 mL oral syrup: 5 milliliter(s) orally once a day    NOTE: No record with pharmacy  Vitamin C 500 mg oral tablet: 1 tab(s) orally once a day  Vitamin D3: 1 tab(s) orally once a day  Zocor 40 mg oral tablet: 1 tab(s) orally once a day (at bedtime)   apixaban 2.5 mg oral tablet: 1 tab(s) orally every 12 hours  digoxin 125 mcg (0.125 mg) oral tablet: 1 tab(s) orally once a day  Multiple Vitamins oral tablet: 1 tab(s) orally once a day  Singulair 10 mg oral tablet: 1 tab(s) orally once a day    Note:No record with pharmacy  traZODone 50 mg oral tablet: 1 tab(s) orally once a day (at bedtime)\    Note:No record with pharmacy  valproic acid 250 mg/5 mL oral syrup: 5 milliliter(s) orally once a day    NOTE: No record with pharmacy  Vitamin C 500 mg oral tablet: 1 tab(s) orally once a day  Vitamin D3: 1 tab(s) orally once a day  Zocor 40 mg oral tablet: 1 tab(s) orally once a day (at bedtime)   apixaban 2.5 mg oral tablet: 1 tab(s) orally every 12 hours  digoxin 125 mcg (0.125 mg) oral tablet: 1 tab(s) orally once a day  metoprolol: 12.5 milligram(s) orally 2 times a day  Multiple Vitamins oral tablet: 1 tab(s) orally once a day  nystatin 100,000 units/g topical powder: 1 application topically 3 times a day  Singulair 10 mg oral tablet: 1 tab(s) orally once a day    Note:No record with pharmacy  Vitamin C 500 mg oral tablet: 1 tab(s) orally once a day  Vitamin D3: 1 tab(s) orally once a day  Zocor 40 mg oral tablet: 1 tab(s) orally once a day (at bedtime)   apixaban 2.5 mg oral tablet: 1 tab(s) orally every 12 hours  digoxin 125 mcg (0.125 mg) oral tablet: 1 tab(s) orally once a day  metoprolol: 12.5 milligram(s) orally 2 times a day  Multiple Vitamins oral tablet: 1 tab(s) orally once a day  nystatin 100,000 units/g topical powder: 1 application topically 3 times a day  Singulair 10 mg oral tablet: 1 tab(s) orally once a day    Note:No record with pharmacy  valproic acid 250 mg/5 mL oral liquid: 5 milliliter(s) orally once a day  Vitamin C 500 mg oral tablet: 1 tab(s) orally once a day  Vitamin D3: 1 tab(s) orally once a day  Zocor 40 mg oral tablet: 1 tab(s) orally once a day (at bedtime)

## 2022-09-23 NOTE — DISCHARGE NOTE PROVIDER - CARE PROVIDER_API CALL
BYRON DAWN  Internal Medicine  [address] [place] [address]  Phone: [phone]  Fax: [phone]  Follow Up Time:    BYRON DAWN  Internal Medicine  [address] [place] [address]  Phone: [phone]  Fax: [phone]  Follow Up Time:     Corona Francois)  Neurology; Vascular Neurology  [address] [facility], [address]  [place] [address]  Phone: [phone]  Fax: [phone]  Follow Up Time:     Moo Rizo (DO)  Cardiovascular Disease; Internal Medicine; Nuclear Cardiology  [address] [facility], [address]  [place] [address]  Phone: [phone]  Fax: [phone]  Follow Up Time:

## 2022-09-23 NOTE — DISCHARGE NOTE PROVIDER - HOSPITAL COURSE
87 year old Female with a PMHx of CVA with residual R sided weakness, dementia complicated by dysphagia s/p PEG, nonverbal, chronic atrial fibrillation off eliquis, presenting s/p fall. Patient was found down @9PM from the chair that she was placed on at 6PM. Unknown LOC or head trauma. Patient's son at bedside states unchanged mental status, but having decreased ability to walk 2/2 possible weakness. Patient unable to verbalize pain anywhere.      Mechanical Fall  - CT Head negative for acute findings  - CT Cervical spine negative for acute fracture  - Xrays --  negative for acute fractures    - PT/OT Consult  - Check TTE -- As noted wit EF of 65-70%  - Check Orthostatics   - Fall and Aspiration precautions  - Neuro consulted; F/u recs  - Pt will require outpatient follow up with Neuro as an outpatient for rEEG     Abnormal UA  - F/u Urine culture --> negative   - Monitor CBC, Temp curve, VS and patient closely     CVA  - With R sided residual weakness  - CT head negative for acute findings  - C/w PEG tube feedings    Afib  - C/w Digoxin   - C/w Zocor   - Have asked Pharmacy at [phone] to follow up with Med rec  - C/w Low dose Eliquis 2.5 BID, Patient is bedbound; low risk for recurrent fall   - C/w lopressor 12.5 BID; monitor patient closely    Dementia  - Fall and Aspiration precautions  - Son unsure of patient is on VPA or Trazadone. Have asked pharmacy to obtain Med rec    PPX  - PPI via PEG and Elqiuis 2.5 BID     Dr. oLwry has medically cleared patient for discharge home with follow-up as advised.          87 year old Female with a PMHx of CVA with residual R sided weakness, dementia complicated by dysphagia s/p PEG, nonverbal, chronic atrial fibrillation off eliquis, presenting s/p fall. Patient was found down @9PM from the chair that she was placed on at 6PM. Unknown LOC or head trauma. Patient's son at bedside states unchanged mental status, but having decreased ability to walk 2/2 possible weakness. Patient unable to verbalize pain anywhere.    Problem/Plan #1  Problem: s/p Fall  Plan: r/o syncope  - EKG at baseline with no ischemic changes noted  - CT Head with no acute intracranial hemorrhage infarct, mass or fracture  - TTE shows preserved EF, normal LV systolic function, no WMA  - Monitor on tele- currently SR  - PT consult    Problem/Plan #2  Problem: Chronic Atrial Fibrillation  Plan: c/w Digoxin  - c/w Apixaban 2.5mg PO BID  - c/w Metoprolol 12.5mg PO BID    Problem/Plan #3  Problem: CVA  Plan:  - Rehab  - EEG out patient  - s/p PEG: c/w tube feedings       89 year old Female with a PMHx of CVA with residual R sided weakness, dementia complicated by dysphagia s/p PEG, nonverbal, chronic atrial fibrillation off eliquis, presenting s/p fall. Patient was found down @9PM from the chair that she was placed on at 6PM. Unknown LOC or head trauma. Patient's son at bedside states unchanged mental status, but having decreased ability to walk 2/2 possible weakness. Patient unable to verbalize pain anywhere.    Mechanical Fall  - CT Head negative for acute findings  - CT Cervical spine negative for acute fracture  - Xrays --  negative for acute fractures    - PT/OT Consult  - TTE -- As noted wit EF of 65-70%  - Check Orthostatics if able. mostly bedbound and uncoorperative.   - Fall and Aspiration precautions  - Neuro consulted; F/u recs  - Pt will require outpatient follow up with Neuro as an outpatient for rEEG     Abnormal UA (though unimpressive, no leuke, no nitrite)  - Urine culture --> negative   - stable off abx.   - can repeat outpt UA.   - Monitor CBC, Temp curve, VS and patient closely     CVA  - With R sided residual weakness  - CT head negative for acute findings  - C/w PEG tube feedings, tolerating    Afib  - C/w Digoxin   - C/w Zocor   - Have asked Pharmacy at [phone] to follow up with Med rec  - C/w Low dose Eliquis 2.5 BID, Patient is bedbound; low risk for recurrent fall   - C/w lopressor 12.5 BID; monitor patient closely    Dementia  - Fall and Aspiration precautions  - Stas Flor unsure of patient is on VPA or Trazadone. Have asked pharmacy to obtain Med rec    PPX  - PPI via PEG and Elqiuis 2.5 BID     Brief NSVT overnight, Dig level nontoxic and electrolytes normal. c/w current regimen per card.     Feeding tube check:  RN report feeding tube site smell and loose bumper  no open wounds. GI eval appreciated. last PEG exchanged back in May 2022.  Initial plan for CT abd, however, uncooperative with CT abd. radiology sent her back.   Low suspicion for PEG site infection/abscess given nontender, no skin breakdown. no leukocytosis, no fever.  tolerating tube feeding  okay to cancel CT abd at this point.  dc planning today.    Dr. Cody medically cleared for discharge to [facility]    89 year old Female with a PMHx of CVA with residual R sided weakness, dementia complicated by dysphagia s/p PEG, nonverbal, chronic atrial fibrillation off eliquis, presenting s/p fall. Patient was found down @9PM from the chair that she was placed on at 6PM. Unknown LOC or head trauma. Patient's son at bedside states unchanged mental status, but having decreased ability to walk 2/2 possible weakness. Patient unable to verbalize pain anywhere.    Mechanical Fall  - CT Head negative for acute findings  - CT Cervical spine negative for acute fracture  - Xrays --  negative for acute fractures    - PT/OT Consult  - TTE -- As noted wit EF of 65-70%  - Check Orthostatics if able. mostly bedbound and uncoorperative.   - Fall and Aspiration precautions  - Neuro consulted; F/u recs  - Pt will require outpatient follow up with Neuro as an outpatient for rEEG     Abnormal UA (though unimpressive, no leuke, no nitrite)  - Urine culture --> negative   - stable off abx.   - can repeat outpt UA.   - Monitor CBC, Temp curve, VS and patient closely     CVA  - With R sided residual weakness  - CT head negative for acute findings  - C/w PEG tube feedings, tolerating    Afib  - C/w Digoxin   - C/w Zocor   - Have asked Pharmacy at [phone] to follow up with Med rec  - C/w Low dose Eliquis 2.5 BID, Patient is bedbound; low risk for recurrent fall   - C/w lopressor 12.5 BID; monitor patient closely    Dementia  - Fall and Aspiration precautions  - Stas Flor unsure of patient is on VPA or Trazadone. Have asked pharmacy to obtain Med rec    PPX  - PPI via PEG and Elqiuis 2.5 BID     Brief NSVT overnight, Dig level nontoxic and electrolytes normal. c/w current regimen per card.     Feeding tube check:  RN report feeding tube site smell and loose bumper  no open wounds. GI eval appreciated. last PEG exchanged back in May 2022.  Initial plan for CT abd, however, uncooperative with CT abd. radiology sent her back.   Low suspicion for PEG site infection/abscess given nontender, no skin breakdown. no leukocytosis, no fever.  tolerating tube feeding  okay to cancel CT abd at this point.  dc planning today.    Dr. Cody medically cleared for discharge to [facility].    Attending Addendum:  Patient seen and examined by me on the discharge day. Medications reviewed.  All questions answered in details. Follow up plan explained.  More than 30 mins were spent evaluating patient and coordinating care for discharge.  --- Coverage for Dr. Lowry ---  - [facility] VENUS [facility]  - [phone]

## 2022-09-23 NOTE — PROGRESS NOTE ADULT - SUBJECTIVE AND OBJECTIVE BOX
DATE OF SERVICE: 09-23-22 @ 21:33    Patient is a 87y old  Female who presents with a chief complaint of Chart Reviewed, Events noted:   87 year old Female with a PMHx of CVA with residual R sided weakness, dementia complicated by dysphagia s/p PEG, nonverbal, chronic atrial fibrillation off eliquis, presenting s/p fall. Patient was found down @9PM from the chair that she was placed on at 6PM. Unknown LOC or head trauma.    (21 Sep 2022 11:18)      INTERVAL HISTORY:     TELEMETRY Personally reviewed:  	  MEDICATIONS:  digoxin     Tablet 125 MICROGram(s) Oral daily  metoprolol tartrate 12.5 milliGRAM(s) Oral two times a day        PHYSICAL EXAM:  T(C): 35.9 (09-23-22 @ 15:25), Max: 36.9 (09-22-22 @ 23:24)  HR: 75 (09-23-22 @ 15:25) (71 - 75)  BP: 144/90 (09-23-22 @ 15:25) (104/63 - 144/90)  RR: 18 (09-23-22 @ 15:25) (18 - 18)  SpO2: 99% (09-23-22 @ 15:25) (97% - 99%)  Wt(kg): --  I&O's Summary        Appearance: In no distress	  HEENT:    PERRL, EOMI	  Cardiovascular:  S1 S2, No JVD  Respiratory: Lungs clear to auscultation	  Gastrointestinal:  Soft, Non-tender, + BS	  Vascularature:  No edema of LE  Psychiatric: Appropriate affect   Neuro: no acute focal deficits           09-22    137  |  104  |  17  ----------------------------<  138<H>  4.2   |  25  |  0.46<L>    Ca    9.1      22 Sep 2022 11:17  Phos  3.5     09-22  Mg     2.1     09-22          Labs personally reviewed      Transthoracic Echocardiogram (09.21.22 @ 11:06) >  EF (Visual Estimate): 65-70 %  ------------------------------------------------------------------------  Observations:  Mitral Valve: Mitral annular calcification, otherwise  normal mitral valve.  Aortic Valve/Aorta: Calcified trileaflet aortic valve with  normal opening. Unable to obtain gradients across the  aortic valve.  Mild aortic regurgitation.  Aortic Root: 3.1 cm.  Left Ventricle: Normal left ventricular systolic function.  No segmental wall motion abnormalities. Interpretation  based on parasternal views only.  Normal left ventricular  internal dimensions and wall thicknesses. Unable to  evaluate diastolic function  Right Heart: The right ventricle is not well visualized.  Normal tricuspid valve. Minimal tricuspid regurgitation.  Normal pulmonic valve. Mild pulmonic regurgitation.  Pericardium/Pleura: Normal pericardium with no pericardial  effusion.  ------------------------------------------------------------------------  Conclusions:  1. Calcified trileaflet aortic valve with normal opening.  Unable to obtain gradients across the aortic valve.  Mild  aortic regurgitation.  2. Normal left ventricular systolic function. No segmental  wall motion abnormalities. Interpretation based on  parasternal views only.  TTE study terminated early at the request of the patient.  *** No previous Echo exam.      ASSESSMENT/PLAN: 	    87 year old Female with a PMHx of CVA with residual R sided weakness, dementia complicated by dysphagia s/p PEG placent, nonverbal, chronic atrial fibrillation presenting s/p fall. Patient was found down from chair. Per son Stas, unknown if patient lost consciousness or head trauma. History obtained from Son Stas via telephone and from chart. Patient is non-verbal at baseline.    Problem/Plan #1  Problem: s/p Fall  Plan: r/o syncope  - EKG at baseline with no ischemic changes noted  - CT Head with no acute intracranial hemorrhage infarct, mass or fracture  - TTE shows preserved EF, normal LV systolic function, no WMA  - Monitor on tele- currently SR  - PT consult    Problem/Plan #2  Problem: Chronic Atrial Fibrillation  Plan: c/w Digoxin  - c/w Apixaban 2.5mg PO BID  - c/w Metoprolol 12.5mg PO BID    Problem/Plan #3  Problem: CVA  Plan:  - PT consult  - s/p PEG: c/w tube feedings    Problem/Plan #4  Problem: DVT PPx  Plan: c/w SQ Heparin            Moo Rizo DO [facility]  Cardiovascular Medicine  [address] [place] [address], [address]  Office: [phone]  Available via Text/call on Microsoft Teams

## 2022-09-23 NOTE — DISCHARGE NOTE PROVIDER - NSDCCPCAREPLAN_GEN_ALL_CORE_FT
PRINCIPAL DISCHARGE DIAGNOSIS  Diagnosis: Fall  Assessment and Plan of Treatment:   Mechanical Fall  - CT Head negative for acute findings  - CT Cervical spine negative for acute fracture  - Xrays --  negative for acute fractures    - PT/OT Consult  - Check TTE -- As noted wit EF of 65-70%  - Check Orthostatics   - Fall and Aspiration precautions  - Pt will require outpatient follow up with Neuro as an outpatient for rEEG         SECONDARY DISCHARGE DIAGNOSES  Diagnosis: Afib  Assessment and Plan of Treatment: Afib  - C/w Digoxin   - C/w Zocor   - Have asked Pharmacy at [phone] to follow up with Med rec  - C/w Low dose Eliquis 2.5 BID, Patient is bedbound; low risk for recurrent fall   - C/w lopressor 12.5 BID; monitor patient closely      Diagnosis: Status post CVA  Assessment and Plan of Treatment:   CVA  - With R sided residual weakness  - CT head negative for acute findings  - C/w PEG tube feedings       PRINCIPAL DISCHARGE DIAGNOSIS  Diagnosis: Fall  Assessment and Plan of Treatment: Plan: r/o syncope  - EKG at baseline with no ischemic changes noted  - CT Head with no acute intracranial hemorrhage infarct, mass or fracture  - TTE shows preserved EF, normal LV systolic function, no WMA  - Monitor on tele- currently SR  - EEG out patient  --Fall precautions  F/U neurology as needed.        SECONDARY DISCHARGE DIAGNOSES  Diagnosis: Afib  Assessment and Plan of Treatment: Afib  - C/w Digoxin   - C/w Zocor   - Have asked Pharmacy at [phone] to follow up with Med rec  - C/w Low dose Eliquis 2.5 BID, Patient is bedbound; low risk for recurrent fall   - C/w lopressor 12.5 BID; monitor patient closely      Diagnosis: Status post CVA  Assessment and Plan of Treatment: CVA-chronic  - With R sided residual weakness  - CT head negative for acute findings  - C/w PEG tube feedings       PRINCIPAL DISCHARGE DIAGNOSIS  Diagnosis: Fall  Assessment and Plan of Treatment: Plan: r/o syncope  - EKG at baseline with no ischemic changes noted  - CT Head with no acute intracranial hemorrhage infarct, mass or fracture  - TTE shows preserved EF, normal LV systolic function, no WMA  - Monitor on tele- currently SR  - EEG out patient  --Fall precautions  F/U neurology as needed.        SECONDARY DISCHARGE DIAGNOSES  Diagnosis: Afib  Assessment and Plan of Treatment: Afib  - C/w Digoxin   - C/w Zocor   - Have asked Pharmacy at 3075 to follow up with Med rec  - C/w Low dose Eliquis 2.5 BID, Patient is bedbound; low risk for recurrent fall   - C/w lopressor 12.5 BID; monitor patient closely      Diagnosis: Status post CVA  Assessment and Plan of Treatment: CVA-chronic  - With R sided residual weakness  - CT head negative for acute findings  - C/w PEG tube feedings ( keep abdominal binder in place/ daily PEG site care)

## 2022-09-23 NOTE — PROGRESS NOTE ADULT - ASSESSMENT
87 year old  Female with a  prior stroke with residual R sided weakness, chronic AF dementia complicated by dysphagia s/p PEG, nonverbal, chronic atrial fibrillation presenting s/p fall.    CTH: volume loss. L frontal and pareital gliosis   CT C spine: no fracture. multilevel degenerative ichagnes   UA+  TTE as above     A1c 5.6  TSH 2.05      Impression: 1) Chronic Stroke embolic stroke likely from AF 2) fall unclear etiology   - rule out infection. f/u urine cx   - should be on AC daily and statin therapy (on simva 40) for secondary stroke prevention if no CI   - unclear why was not on AC; should at least be on asa if unable for AC --> eliquis 2.5mg BID started   - cardiac workup in progress for syncope.  tele  - can check B12, RPR, TSH(WNL)  - unclear if LOC.  can get rEEG in or outpatient  - check orthostatics    - PT/OT   - check FS, glucose control <180  - GI/DVT ppx  - Thank you for allowing me to participate in the care of this patient. Call with questions.   - plan for YESSICA Francois MD  Vascular Neurology  Office: [phone]

## 2022-09-23 NOTE — PROGRESS NOTE ADULT - SUBJECTIVE AND OBJECTIVE BOX
Neurology Progress Note    S: Patient seen and examined. No new events overnight. doing okay     Medication:  MEDICATIONS  (STANDING):  apixaban 2.5 milliGRAM(s) Oral every 12 hours  ascorbic acid 500 milliGRAM(s) Oral daily  cholecalciferol 1000 Unit(s) Oral daily  digoxin     Tablet 125 MICROGram(s) Oral daily  metoprolol tartrate 12.5 milliGRAM(s) Oral two times a day  montelukast 10 milliGRAM(s) Oral daily  multivitamin 1 Tablet(s) Oral daily  nystatin Powder 1 Application(s) Topical three times a day  simvastatin 40 milliGRAM(s) Oral at bedtime    MEDICATIONS  (PRN):      Vitals:  Vital Signs Last 24 Hrs    Vital Signs Last 24 Hrs  T(C): 36.9 (09-22-22 @ 23:24), Max: 36.9 (09-22-22 @ 23:24)  T(F): 98.5 (09-22-22 @ 23:24), Max: 98.5 (09-22-22 @ 23:24)  HR: 74 (09-22-22 @ 23:24) (72 - 74)  BP: 142/78 (09-22-22 @ 23:24) (130/69 - 142/78)  BP(mean): --  RR: 18 (09-22-22 @ 23:24) (18 - 18)  SpO2: 97% (09-22-22 @ 23:24) (97% - 98%)          General Exam:   General Appearance: Appropriately dressed and in no acute distress       Head: Normocephalic, atraumatic and no dysmorphic features  Ear, Nose, and Throat: Moist mucous membranes  CVS: S1S2+  Resp: No SOB, no wheeze or rhonchi  GI: soft NT/ND  Extremities: No edema or cyanosis  Skin: No bruises or rashes     Neurological Exam:  Mental Status: Awake, alert not following commands, minimal/no verbal   Cranial Nerves: PERRL, EOMI, VFFC, sensation V1-V3 intact, R facial  , equal elevation of palate, scm/trap 5/5, tongue is midline on protrusion. no obvious papilledema on fundoscopic exam. hearing is grossly intact.   Motor: RUE 0/5 contracter RLE 2/5; moving L antigravity   Sensation: withdraws L>R   Reflexes: 1+ throughout at biceps, brachioradialis, triceps, patellars and ankles bilaterally and equal. No clonus. R toe and L toe were both downgoing.  Coordination: unable   Gait: unable     I personally reviewed the below data/images/labs:      09-22    137  |  104  |  17  ----------------------------<  138<H>  4.2   |  25  |  0.46<L>    Ca    9.1      22 Sep 2022 11:17  Phos  3.5     09-22  Mg     2.1     09-22          ACC: [id number] EXAM:  CT CERVICAL SPINE                        ACC: [id number] EXAM:  CT BRAIN                          PROCEDURE DATE:  09/19/2022          INTERPRETATION:  CLINICAL INFORMATION: Unwitnessed fall on Eliquis. Found   down with head andneck trauma.    TECHNIQUE: CT of the head was performed in multiple axial sections from   the base of the skull to the vertex with coronal and sagittal reformats.    CT of the cervical spine was performed in bone and soft tissue windows   with coronal and sagittal reformats. No intravenous contrast was   administered. Beam hardening artifact slightly obscures evaluation of the   posterior fossa and brainstem.    COMPARISON: CT of the head from 3/24/2017.    FINDINGS:    Head:  Parenchymal volume loss is noted with prominent ventricles and sulci.   Chronic microvascular ischemic changes are identified. Gliosis and   encephalomalacia is again seen in the left frontal and parietal regions   with ex vacuo dilatation of the left lateral ventricle likely a sequela   of prior infarction. No acute territorial infarct is demonstrated.    There is no evidence of an acute hemorrhage or mass-effect in the   posterior fossa or in the supratentorial region. Senescent bilateral   basal ganglia calcifications are noted.    Evaluation of the osseous structures with the appropriate window appears   unremarkable. Sequela of bilateral lens surgery is seen. The visualized   paranasal sinuses and mastoid air cells are clear.    Cervical spine:  Bones: The cervical vertebral body heights and alignment are maintained.   No fracture or spondylolisthesis is demonstrated. Mild disc space   narrowing is seen at C5-C6 and C6-C7. Small marginal osteophytes are   noted from C4 to C7. Multilevel posterior disc osteophyte complexes are   seen without evidence of severe spinal canal stenosis. Multilevel neural   foraminal stenosis is noted.    Soft tissues: The prevertebral soft tissues are unremarkable. The thyroid   is heterogeneously enlarged possibly due to a goiter. A surgical clip is   seen in the posterior left upper chest soft tissues.    Lung apices: Biapical scarring is noted.    IMPRESSION:  1. No acute intracranial hemorrhage, territorial infarct, mass effect or   calvarial fracture.  2. Multilevel degenerative changes of the cervical spine without evidence   of a fracture.    --- End of Report ---            MARLENI LEVI MD; Attending Radiologist  This document has been electronically signed. Sep 19 2022  2:52AM    < end of copied text >    < from: Transthoracic Echocardiogram (09.21.22 @ 11:06) >    Patient name: CLARISA DAWN  YOB: 1935   Age: 87 (F)   MR#: [id number]  Study Date: 9/21/2022  [facility]ae[facility] RDCS  Study quality: difficult due to patient being  Referring Physician: Carter Lowry MD  Blood Pressure: 121/78 mmHg  Height: 152 cm  Weight: 47 kg  BSA: 1.4 m2  ------------------------------------------------------------------------  PROCEDURE: Transthoracic echocardiogram with 2-D, M-Mode  and complete spectral and color flow Doppler.  INDICATION: Syncope and collapse (R55)  ------------------------------------------------------------------------  Dimensions:    Normal Values:  LA:     4.4    2.0 - 4.0 cm  Ao:     3.1    2.0 - 3.8 cm  SEPTUM: 0.9    0.6 - 1.2 cm  PWT:    0.9    0.6 -1.1 cm  LVIDd:  4.0    3.0 - 5.6 cm  LVIDs:  2.3    1.8 - 4.0 cm  Derived variables:  LVMI: 77 g/m2  RWT: 0.45  Fractional short: 43 %  EF (Visual Estimate): 65-70 %  ------------------------------------------------------------------------  Observations:  Mitral Valve: Mitral annular calcification, otherwise  normal mitral valve.  Aortic Valve/Aorta: Calcified trileaflet aortic valve with  normal opening. Unable to obtain gradients across the  aortic valve.  Mild aortic regurgitation.  Aortic Root: 3.1 cm.  Left Ventricle: Normal left ventricular systolic function.  No segmental wall motion abnormalities. Interpretation  based on parasternal views only.  Normal left ventricular  internal dimensions and wall thicknesses. Unable to  evaluate diastolic function  Right Heart: The right ventricle is not well visualized.  Normal tricuspid valve. Minimal tricuspid regurgitation.  Normal pulmonic valve. Mild pulmonic regurgitation.  Pericardium/Pleura: Normal pericardium with no pericardial  effusion.  ------------------------------------------------------------------------  Conclusions:  1. Calcified trileaflet aortic valve with normal opening.  Unable to obtain gradients across the aortic valve.  Mild  aortic regurgitation.  2. Normal left ventricular systolic function. No segmental  wall motion abnormalities. Interpretation based on  parasternal views only.  TTE study terminated early at the request of the patient.  *** No previous Echo exam.  ------------------------------------------------------------------------  Confirmed on  9/21/2022 - 14:31:23 by Denny Scott M.D.  ------------------------------------------------------------------------    < end of copied text >  Orthostatic VS

## 2022-09-23 NOTE — DISCHARGE NOTE PROVIDER - NSDCQMSTAIRS_GEN_ALL_CORE
1. Have you been to the ER, urgent care clinic since your last visit? Hospitalized since your last visit? No    2. Have you seen or consulted any other health care providers outside of the [address] [place] [address] since your last visit? Include any pap smears or colon screening.  No No Yes

## 2022-09-23 NOTE — DISCHARGE NOTE PROVIDER - PROVIDER TOKENS
PROVIDER:[TOKEN:[[id number]]] PROVIDER:[TOKEN:[[id number]]],PROVIDER:[TOKEN:[[id number]]],PROVIDER:[TOKEN:[[id number]]]

## 2022-09-24 RX ORDER — VALPROIC ACID (AS SODIUM SALT) 250 MG/5ML
5 SOLUTION, ORAL ORAL
Qty: 0 | Refills: 0 | DISCHARGE

## 2022-09-24 RX ORDER — TRAZODONE HCL 50 MG
1 TABLET ORAL
Qty: 0 | Refills: 0 | DISCHARGE

## 2022-09-24 RX ORDER — NYSTATIN CREAM 100000 [USP'U]/G
1 CREAM TOPICAL
Refills: 0 | Status: DISCONTINUED | OUTPATIENT
Start: 2022-09-24 | End: 2022-09-28

## 2022-09-24 RX ORDER — METOPROLOL TARTRATE 50 MG
12.5 TABLET ORAL
Qty: 0 | Refills: 0 | DISCHARGE
Start: 2022-09-24

## 2022-09-24 RX ORDER — CHLORHEXIDINE GLUCONATE 213 G/1000ML
15 SOLUTION TOPICAL
Refills: 0 | Status: DISCONTINUED | OUTPATIENT
Start: 2022-09-24 | End: 2022-09-28

## 2022-09-24 RX ORDER — NYSTATIN CREAM 100000 [USP'U]/G
1 CREAM TOPICAL
Qty: 0 | Refills: 0 | DISCHARGE
Start: 2022-09-24

## 2022-09-24 RX ADMIN — APIXABAN 2.5 MILLIGRAM(S): 2.5 TABLET, FILM COATED ORAL at 10:12

## 2022-09-24 RX ADMIN — Medication 12.5 MILLIGRAM(S): at 17:02

## 2022-09-24 RX ADMIN — MONTELUKAST 10 MILLIGRAM(S): 4 TABLET, CHEWABLE ORAL at 10:13

## 2022-09-24 RX ADMIN — NYSTATIN CREAM 1 APPLICATION(S): 100000 CREAM TOPICAL at 06:02

## 2022-09-24 RX ADMIN — Medication 12.5 MILLIGRAM(S): at 06:02

## 2022-09-24 RX ADMIN — SIMVASTATIN 40 MILLIGRAM(S): 20 TABLET, FILM COATED ORAL at 22:53

## 2022-09-24 RX ADMIN — NYSTATIN CREAM 1 APPLICATION(S): 100000 CREAM TOPICAL at 23:09

## 2022-09-24 RX ADMIN — Medication 125 MICROGRAM(S): at 06:02

## 2022-09-24 RX ADMIN — Medication 500 MILLIGRAM(S): at 10:13

## 2022-09-24 RX ADMIN — APIXABAN 2.5 MILLIGRAM(S): 2.5 TABLET, FILM COATED ORAL at 22:53

## 2022-09-24 RX ADMIN — NYSTATIN CREAM 1 APPLICATION(S): 100000 CREAM TOPICAL at 17:02

## 2022-09-24 RX ADMIN — Medication 1 TABLET(S): at 10:13

## 2022-09-24 RX ADMIN — NYSTATIN CREAM 1 APPLICATION(S): 100000 CREAM TOPICAL at 12:00

## 2022-09-24 RX ADMIN — Medication 1000 UNIT(S): at 10:13

## 2022-09-24 NOTE — PROGRESS NOTE ADULT - ASSESSMENT
87 year old  Female with a  prior stroke with residual R sided weakness, chronic AF dementia complicated by dysphagia s/p PEG, nonverbal, chronic atrial fibrillation presenting s/p fall.    CTH: volume loss. L frontal and pareital gliosis   CT C spine: no fracture. multilevel degenerative ichagnes   UA+  TTE as above     A1c 5.6  TSH 2.05      Impression: 1) Chronic Stroke embolic stroke likely from AF 2) fall unclear etiology   - rule out infection. f/u urine cx   - should be on AC daily and statin therapy (on simva 40) for secondary stroke prevention if no CI   - unclear why was not on AC; should at least be on asa if unable for AC --> eliquis 2.5mg BID started   - cardiac workup in progress for syncope.  tele  - can check B12, RPR, TSH(WNL)  - unclear if LOC.  can get rEEG in or outpatient  - check orthostatics    - PT/OT   - check FS, glucose control <180  - GI/DVT ppx  - Thank you for allowing me to participate in the care of this patient. Call with questions.   - plan for YESSICA pending auth   Corona Francois MD  Vascular Neurology  Office: [phone]

## 2022-09-24 NOTE — PROGRESS NOTE ADULT - SUBJECTIVE AND OBJECTIVE BOX
Neurology Progress Note    S: Patient seen and examined. No new events overnight. doing okay     Medication:    MEDICATIONS  (STANDING):  apixaban 2.5 milliGRAM(s) Oral every 12 hours  ascorbic acid 500 milliGRAM(s) Oral daily  cholecalciferol 1000 Unit(s) Oral daily  digoxin     Tablet 125 MICROGram(s) Oral daily  metoprolol tartrate 12.5 milliGRAM(s) Oral two times a day  montelukast 10 milliGRAM(s) Oral daily  multivitamin 1 Tablet(s) Oral daily  nystatin Powder 1 Application(s) Topical three times a day  simvastatin 40 milliGRAM(s) Oral at bedtime    MEDICATIONS  (PRN):        Vitals:       Vital Signs Last 24 Hrs  T(C): 36.8 (09-24-22 @ 00:55), Max: 36.8 (09-24-22 @ 00:55)  T(F): 98.2 (09-24-22 @ 00:55), Max: 98.2 (09-24-22 @ 00:55)  HR: 73 (09-24-22 @ 00:55) (71 - 75)  BP: 131/62 (09-24-22 @ 00:55) (104/63 - 144/90)  BP(mean): --  RR: 18 (09-24-22 @ 00:55) (18 - 18)  SpO2: 99% (09-24-22 @ 00:55) (97% - 99%)          General Exam:   General Appearance: Appropriately dressed and in no acute distress       Head: Normocephalic, atraumatic and no dysmorphic features  Ear, Nose, and Throat: Moist mucous membranes  CVS: S1S2+  Resp: No SOB, no wheeze or rhonchi  GI: soft NT/ND  Extremities: No edema or cyanosis  Skin: No bruises or rashes     Neurological Exam:  Mental Status: Awake, alert not following commands, minimal/no verbal   Cranial Nerves: PERRL, EOMI, VFFC, sensation V1-V3 intact, R facial  , equal elevation of palate, scm/trap 5/5, tongue is midline on protrusion. no obvious papilledema on fundoscopic exam. hearing is grossly intact.   Motor: RUE 0/5 contracter RLE 2/5; moving L antigravity   Sensation: withdraws L>R   Reflexes: 1+ throughout at biceps, brachioradialis, triceps, patellars and ankles bilaterally and equal. No clonus. R toe and L toe were both downgoing.  Coordination: unable   Gait: unable     I personally reviewed the below data/images/labs:    no new labs   09-22    137  |  104  |  17  ----------------------------<  138<H>  4.2   |  25  |  0.46<L>    Ca    9.1      22 Sep 2022 11:17  Phos  3.5     09-22  Mg     2.1     09-22          ACC: [id number] EXAM:  CT CERVICAL SPINE                        ACC: [id number] EXAM:  CT BRAIN                          PROCEDURE DATE:  09/19/2022          INTERPRETATION:  CLINICAL INFORMATION: Unwitnessed fall on Eliquis. Found   down with head andneck trauma.    TECHNIQUE: CT of the head was performed in multiple axial sections from   the base of the skull to the vertex with coronal and sagittal reformats.    CT of the cervical spine was performed in bone and soft tissue windows   with coronal and sagittal reformats. No intravenous contrast was   administered. Beam hardening artifact slightly obscures evaluation of the   posterior fossa and brainstem.    COMPARISON: CT of the head from 3/24/2017.    FINDINGS:    Head:  Parenchymal volume loss is noted with prominent ventricles and sulci.   Chronic microvascular ischemic changes are identified. Gliosis and   encephalomalacia is again seen in the left frontal and parietal regions   with ex vacuo dilatation of the left lateral ventricle likely a sequela   of prior infarction. No acute territorial infarct is demonstrated.    There is no evidence of an acute hemorrhage or mass-effect in the   posterior fossa or in the supratentorial region. Senescent bilateral   basal ganglia calcifications are noted.    Evaluation of the osseous structures with the appropriate window appears   unremarkable. Sequela of bilateral lens surgery is seen. The visualized   paranasal sinuses and mastoid air cells are clear.    Cervical spine:  Bones: The cervical vertebral body heights and alignment are maintained.   No fracture or spondylolisthesis is demonstrated. Mild disc space   narrowing is seen at C5-C6 and C6-C7. Small marginal osteophytes are   noted from C4 to C7. Multilevel posterior disc osteophyte complexes are   seen without evidence of severe spinal canal stenosis. Multilevel neural   foraminal stenosis is noted.    Soft tissues: The prevertebral soft tissues are unremarkable. The thyroid   is heterogeneously enlarged possibly due to a goiter. A surgical clip is   seen in the posterior left upper chest soft tissues.    Lung apices: Biapical scarring is noted.    IMPRESSION:  1. No acute intracranial hemorrhage, territorial infarct, mass effect or   calvarial fracture.  2. Multilevel degenerative changes of the cervical spine without evidence   of a fracture.    --- End of Report ---            MARLENI LEVI MD; Attending Radiologist  This document has been electronically signed. Sep 19 2022  2:52AM    < end of copied text >    < from: Transthoracic Echocardiogram (09.21.22 @ 11:06) >    Patient name: CLARISA DAWN  YOB: 1935   Age: 87 (F)   MR#: [id number]  Study Date: 9/21/2022  [facility]ae[facility] RDCS  Study quality: difficult due to patient being  Referring Physician: Carter Lowry MD  Blood Pressure: 121/78 mmHg  Height: 152 cm  Weight: 47 kg  BSA: 1.4 m2  ------------------------------------------------------------------------  PROCEDURE: Transthoracic echocardiogram with 2-D, M-Mode  and complete spectral and color flow Doppler.  INDICATION: Syncope and collapse (R55)  ------------------------------------------------------------------------  Dimensions:    Normal Values:  LA:     4.4    2.0 - 4.0 cm  Ao:     3.1    2.0 - 3.8 cm  SEPTUM: 0.9    0.6 - 1.2 cm  PWT:    0.9    0.6 -1.1 cm  LVIDd:  4.0    3.0 - 5.6 cm  LVIDs:  2.3    1.8 - 4.0 cm  Derived variables:  LVMI: 77 g/m2  RWT: 0.45  Fractional short: 43 %  EF (Visual Estimate): 65-70 %  ------------------------------------------------------------------------  Observations:  Mitral Valve: Mitral annular calcification, otherwise  normal mitral valve.  Aortic Valve/Aorta: Calcified trileaflet aortic valve with  normal opening. Unable to obtain gradients across the  aortic valve.  Mild aortic regurgitation.  Aortic Root: 3.1 cm.  Left Ventricle: Normal left ventricular systolic function.  No segmental wall motion abnormalities. Interpretation  based on parasternal views only.  Normal left ventricular  internal dimensions and wall thicknesses. Unable to  evaluate diastolic function  Right Heart: The right ventricle is not well visualized.  Normal tricuspid valve. Minimal tricuspid regurgitation.  Normal pulmonic valve. Mild pulmonic regurgitation.  Pericardium/Pleura: Normal pericardium with no pericardial  effusion.  ------------------------------------------------------------------------  Conclusions:  1. Calcified trileaflet aortic valve with normal opening.  Unable to obtain gradients across the aortic valve.  Mild  aortic regurgitation.  2. Normal left ventricular systolic function. No segmental  wall motion abnormalities. Interpretation based on  parasternal views only.  TTE study terminated early at the request of the patient.  *** No previous Echo exam.  ------------------------------------------------------------------------  Confirmed on  9/21/2022 - 14:31:23 by Denny Scott M.D.  ------------------------------------------------------------------------    < end of copied text >  Orthostatic VS

## 2022-09-24 NOTE — PROGRESS NOTE ADULT - ASSESSMENT
87 year old Female with a PMHx of CVA with residual R sided weakness, dementia complicated by dysphagia s/p PEG, nonverbal, chronic atrial fibrillation off eliquis, presenting s/p fall. Patient was found down @9PM from the chair that she was placed on at 6PM. Unknown LOC or head trauma. Patient's son at bedside states unchanged mental status, but having decreased ability to walk 2/2 possible weakness. Patient unable to verbalize pain anywhere.      Mechanical Fall  - CT Head negative for acute findings  - CT Cervical spine negative for acute fracture  - Xrays --  negative for acute fractures    - PT/OT Consult  - Check TTE -- As noted wit EF of 65-70%  - Check Orthostatics   - Fall and Aspiration precautions  - Neuro consulted; F/u recs  - Pt will require outpatient follow up with Neuro as an outpatient for rEEG     Abnormal UA  - F/u Urine culture --> negative   - Monitor CBC, Temp curve, VS and patient closely     CVA  - With R sided residual weakness  - CT head negative for acute findings  - C/w PEG tube feedings    Afib  - C/w Digoxin   - C/w Zocor   - Have asked Pharmacy at [phone] to follow up with Med rec  - C/w Low dose Eliquis 2.5 BID, Patient is bedbound; low risk for recurrent fall   - C/w lopressor 12.5 BID; monitor patient closely    Dementia  - Fall and Aspiration precautions  - SonStas unsure of patient is on VPA or Trazadone. Have asked pharmacy to obtain Med rec    PPX  - PPI via PEG and Elqiuis 2.5 BID     D/C planning underway awaiting placement/ authorization for rehab

## 2022-09-24 NOTE — PROGRESS NOTE ADULT - SUBJECTIVE AND OBJECTIVE BOX
Name of Patient : CLARISA DAWN  MRN: [id number]  Date of visit: 09-24-22 @ 13:36      Subjective: Patient seen and examined. No new events except as noted.   Patient seen earlier this AM. Doing okay  Discharge planning underway     REVIEW OF SYSTEMS:  Unable to obtain ROS    MEDICATIONS:  MEDICATIONS  (STANDING):  apixaban 2.5 milliGRAM(s) Oral every 12 hours  ascorbic acid 500 milliGRAM(s) Oral daily  cholecalciferol 1000 Unit(s) Oral daily  digoxin     Tablet 125 MICROGram(s) Oral daily  metoprolol tartrate 12.5 milliGRAM(s) Oral two times a day  montelukast 10 milliGRAM(s) Oral daily  multivitamin 1 Tablet(s) Oral daily  nystatin Powder 1 Application(s) Topical two times a day  nystatin Powder 1 Application(s) Topical three times a day  simvastatin 40 milliGRAM(s) Oral at bedtime      PHYSICAL EXAM:  T(C): 36.3 (09-24-22 @ 08:48), Max: 36.8 (09-24-22 @ 00:55)  HR: 77 (09-24-22 @ 08:48) (73 - 77)  BP: 121/77 (09-24-22 @ 08:48) (121/77 - 144/90)  RR: 18 (09-24-22 @ 08:48) (18 - 18)  SpO2: 100% (09-24-22 @ 08:48) (99% - 100%)  Wt(kg): --  I&O's Summary    23 Sep 2022 07:01  -  24 Sep 2022 07:00  --------------------------------------------------------  IN: 760 mL / OUT: 0 mL / NET: 760 mL        Appearance: Awake	  HEENT: Eyes are open   Lymphatic: No lymphadenopathy   Cardiovascular: Normal S1 S2, no JVD  Respiratory: normal effort , clear  Gastrointestinal:  Soft, +PEG   Skin: No rashes,  warm to touch  Psychiatry:  Calm, unable to asses   Musculoskeletal: No edema              09-23-22 @ 07:01  -  09-24-22 @ 07:00  --------------------------------------------------------  IN: 760 mL / OUT: 0 mL / NET: 760 mL            Culture - Urine (09.19.22 @ 03:46)   Specimen Source: Catheterized Catheterized   Culture Results:   <10,000 CFU/mL Normal Urogenital Coreen

## 2022-09-25 LAB — SARS-COV-2 RNA SPEC QL NAA+PROBE: SIGNIFICANT CHANGE UP

## 2022-09-25 RX ADMIN — APIXABAN 2.5 MILLIGRAM(S): 2.5 TABLET, FILM COATED ORAL at 21:41

## 2022-09-25 RX ADMIN — NYSTATIN CREAM 1 APPLICATION(S): 100000 CREAM TOPICAL at 06:02

## 2022-09-25 RX ADMIN — MONTELUKAST 10 MILLIGRAM(S): 4 TABLET, CHEWABLE ORAL at 12:14

## 2022-09-25 RX ADMIN — Medication 500 MILLIGRAM(S): at 12:15

## 2022-09-25 RX ADMIN — Medication 125 MICROGRAM(S): at 06:02

## 2022-09-25 RX ADMIN — NYSTATIN CREAM 1 APPLICATION(S): 100000 CREAM TOPICAL at 14:12

## 2022-09-25 RX ADMIN — SIMVASTATIN 40 MILLIGRAM(S): 20 TABLET, FILM COATED ORAL at 21:41

## 2022-09-25 RX ADMIN — NYSTATIN CREAM 1 APPLICATION(S): 100000 CREAM TOPICAL at 18:02

## 2022-09-25 RX ADMIN — Medication 12.5 MILLIGRAM(S): at 18:02

## 2022-09-25 RX ADMIN — CHLORHEXIDINE GLUCONATE 15 MILLILITER(S): 213 SOLUTION TOPICAL at 06:31

## 2022-09-25 RX ADMIN — CHLORHEXIDINE GLUCONATE 15 MILLILITER(S): 213 SOLUTION TOPICAL at 19:01

## 2022-09-25 RX ADMIN — APIXABAN 2.5 MILLIGRAM(S): 2.5 TABLET, FILM COATED ORAL at 12:14

## 2022-09-25 RX ADMIN — Medication 1 TABLET(S): at 12:14

## 2022-09-25 RX ADMIN — NYSTATIN CREAM 1 APPLICATION(S): 100000 CREAM TOPICAL at 21:41

## 2022-09-25 RX ADMIN — Medication 1000 UNIT(S): at 12:14

## 2022-09-25 RX ADMIN — Medication 12.5 MILLIGRAM(S): at 06:02

## 2022-09-25 NOTE — PROGRESS NOTE ADULT - NS ATTEND AMEND GEN_ALL_CORE FT
Patient care and plan discussed and reviewed with Advanced Care Provider. Plan as outlined above edited by me to reflect our discussion.
Patient care and plan discussed and reviewed with Advanced Care Provider. Plan as outlined above edited by me to reflect our discussion.
Pt care and plan discussed and reviewed with PA. Plan as outlined above edited by me to reflect our discussion. Advanced care planning/advanced directives discussed with patient/family. DNR status including forceful chest compressions to attempt to restart the heart, ventilator support/artificial breathing, electric shock, artificial nutrition, health care proxy, Molst form all discussed with pt. Pt wishes to consider.
Patient care and plan discussed and reviewed with Advanced Care Provider. Plan as outlined above edited by me to reflect our discussion.
Pt care and plan discussed and reviewed with PA. Plan as outlined above edited by me to reflect our discussion.

## 2022-09-25 NOTE — PROGRESS NOTE ADULT - SUBJECTIVE AND OBJECTIVE BOX
DATE OF SERVICE: 09-25-22 @ 14:29    Patient is a 87y old  Female who presents with a chief complaint of Chart Reviewed, Events noted:   87 year old Female with a PMHx of CVA with residual R sided weakness, dementia complicated by dysphagia s/p PEG, nonverbal, chronic atrial fibrillation off eliquis, presenting s/p fall. Patient was found down @9PM from the chair that she was placed on at 6PM. Unknown LOC or head trauma.    (21 Sep 2022 11:18)      INTERVAL HISTORY: No complaints    REVIEW OF SYSTEMS:  CONSTITUTIONAL: No weakness  EYES/ENT: No visual changes;  No throat pain   NECK: No pain or stiffness  RESPIRATORY: No cough, wheezing; No shortness of breath  CARDIOVASCULAR: No chest pain or palpitations  GASTROINTESTINAL: No abdominal  pain. No nausea, vomiting, or hematemesis  GENITOURINARY: No dysuria, frequency or hematuria  NEUROLOGICAL: No stroke like symptoms  SKIN: No rashes    TELEMETRY Personally reviewed:  	  MEDICATIONS:  digoxin     Tablet 125 MICROGram(s) Oral daily  metoprolol tartrate 12.5 milliGRAM(s) Oral two times a day        PHYSICAL EXAM:  T(C): 36.3 (09-25-22 @ 09:03), Max: 37.2 (09-24-22 @ 16:20)  HR: 77 (09-25-22 @ 09:03) (73 - 78)  BP: 105/73 (09-25-22 @ 09:03) (105/73 - 130/70)  RR: 18 (09-25-22 @ 09:03) (18 - 18)  SpO2: 99% (09-25-22 @ 09:03) (98% - 100%)  Wt(kg): --  I&O's Summary    24 Sep 2022 07:01  -  25 Sep 2022 07:00  --------------------------------------------------------  IN: 720 mL / OUT: 0 mL / NET: 720 mL          Appearance: In no distress	  HEENT:    PERRL, EOMI	  Cardiovascular:  S1 S2, No JVD  Respiratory: Lungs clear to auscultation	  Gastrointestinal:  Soft, Non-tender, + BS	  Vascularature:  No edema of LE  Psychiatric: Appropriate affect   Neuro: no acute focal deficits                     Labs personally reviewed      ASSESSMENT/PLAN: 	  87 year old Female with a PMHx of CVA with residual R sided weakness, dementia complicated by dysphagia s/p PEG placent, nonverbal, chronic atrial fibrillation presenting s/p fall. Patient was found down from chair. Per son Stas, unknown if patient lost consciousness or head trauma. History obtained from Son Stas via telephone and from chart. Patient is non-verbal at baseline.    Problem/Plan #1  Problem: s/p Fall  Plan: r/o syncope  - EKG at baseline with no ischemic changes noted  - CT Head with no acute intracranial hemorrhage infarct, mass or fracture  - TTE shows preserved EF, normal LV systolic function, no WMA  - Monitor on tele- currently SR  - PT consult    Problem/Plan #2  Problem: Chronic Atrial Fibrillation  Plan: c/w Digoxin  - c/w Apixaban 2.5mg PO BID  - c/w Metoprolol 12.5mg PO BID    Problem/Plan #3  Problem: CVA  Plan:  - PT consult  - s/p PEG: c/w tube feedings    Problem/Plan #4  Problem: DVT PPx  Plan: c/w SQ Heparin          CJ Neumann DO [facility]  Cardiovascular Medicine  [address] [place] [address], [address]  Office: [phone]  Cell: [phone] DATE OF SERVICE: 09-25-22 @ 14:29    Patient is a 87y old  Female who presents with a chief complaint of Chart Reviewed, Events noted:   87 year old Female with a PMHx of CVA with residual R sided weakness, dementia complicated by dysphagia s/p PEG, nonverbal, chronic atrial fibrillation off eliquis, presenting s/p fall. Patient was found down @9PM from the chair that she was placed on at 6PM. Unknown LOC or head trauma.    (21 Sep 2022 11:18)      INTERVAL HISTORY: Non verbal    REVIEW OF SYSTEMS:  CONSTITUTIONAL: No weakness  EYES/ENT: No visual changes;  No throat pain   NECK: No pain or stiffness  RESPIRATORY: No cough, wheezing; No shortness of breath  CARDIOVASCULAR: No chest pain or palpitations  GASTROINTESTINAL: No abdominal  pain. No nausea, vomiting, or hematemesis  GENITOURINARY: No dysuria, frequency or hematuria  NEUROLOGICAL: No stroke like symptoms  SKIN: No rashes    	  MEDICATIONS:  digoxin     Tablet 125 MICROGram(s) Oral daily  metoprolol tartrate 12.5 milliGRAM(s) Oral two times a day        PHYSICAL EXAM:  T(C): 36.3 (09-25-22 @ 09:03), Max: 37.2 (09-24-22 @ 16:20)  HR: 77 (09-25-22 @ 09:03) (73 - 78)  BP: 105/73 (09-25-22 @ 09:03) (105/73 - 130/70)  RR: 18 (09-25-22 @ 09:03) (18 - 18)  SpO2: 99% (09-25-22 @ 09:03) (98% - 100%)  Wt(kg): --  I&O's Summary    24 Sep 2022 07:01  -  25 Sep 2022 07:00  --------------------------------------------------------  IN: 720 mL / OUT: 0 mL / NET: 720 mL          Appearance: In no distress	  HEENT:    PERRL, EOMI	  Cardiovascular:  S1 S2, No JVD  Respiratory: Lungs clear to auscultation	  Gastrointestinal:  Soft, Non-tender, + BS	  Vascularature:  No edema of LE  Psychiatric: Appropriate affect   Neuro: no acute focal deficits                     Labs personally reviewed      ASSESSMENT/PLAN: 	  87 year old Female with a PMHx of CVA with residual R sided weakness, dementia complicated by dysphagia s/p PEG placent, nonverbal, chronic atrial fibrillation presenting s/p fall. Patient was found down from chair. Per son Stas, unknown if patient lost consciousness or head trauma. History obtained from Son Stas via telephone and from chart. Patient is non-verbal at baseline.    Problem/Plan #1  Problem: s/p Fall  Plan: r/o syncope  - EKG at baseline with no ischemic changes noted  - CT Head with no acute intracranial hemorrhage infarct, mass or fracture  - TTE shows preserved EF, normal LV systolic function, no WMA  - Monitor on tele- currently SR  - PT consult    Problem/Plan #2  Problem: Chronic Atrial Fibrillation  Plan: c/w Digoxin  - c/w Apixaban 2.5mg PO BID  - c/w Metoprolol 12.5mg PO BID    Problem/Plan #3  Problem: CVA  Plan:  - PT consult  - s/p PEG: c/w tube feedings    Problem/Plan #4  Problem: DVT PPx  Plan: c/w SQ Heparin          CJ Neumann DO [facility]  Cardiovascular Medicine  [address] [place] [address], [address]  Office: [phone]  Cell: [phone]

## 2022-09-25 NOTE — PROGRESS NOTE ADULT - SUBJECTIVE AND OBJECTIVE BOX
Name of Patient : CLARISA DAWN  MRN: [id number]  Date of visit: 09-25-22       Subjective: Patient seen and examined. No new events except as noted.     REVIEW OF SYSTEMS:    CONSTITUTIONAL: No weakness, fevers or chills  EYES/ENT: No visual changes;  No vertigo or throat pain   NECK: No pain or stiffness  RESPIRATORY: No cough, wheezing, hemoptysis; No shortness of breath  CARDIOVASCULAR: No chest pain or palpitations  GASTROINTESTINAL: No abdominal or epigastric pain. No nausea, vomiting, or hematemesis; No diarrhea or constipation. No melena or hematochezia.  GENITOURINARY: No dysuria, frequency or hematuria  NEUROLOGICAL: No numbness or weakness  SKIN: No itching, burning, rashes, or lesions   All other review of systems is negative unless indicated above.    MEDICATIONS:  MEDICATIONS  (STANDING):  apixaban 2.5 milliGRAM(s) Oral every 12 hours  ascorbic acid 500 milliGRAM(s) Oral daily  chlorhexidine 0.12% Liquid 15 milliLiter(s) Oral Mucosa two times a day  cholecalciferol 1000 Unit(s) Oral daily  digoxin     Tablet 125 MICROGram(s) Oral daily  metoprolol tartrate 12.5 milliGRAM(s) Oral two times a day  montelukast 10 milliGRAM(s) Oral daily  multivitamin 1 Tablet(s) Oral daily  nystatin Powder 1 Application(s) Topical two times a day  nystatin Powder 1 Application(s) Topical three times a day  simvastatin 40 milliGRAM(s) Oral at bedtime      PHYSICAL EXAM:  T(C): 36.6 (09-25-22 @ 15:24), Max: 37.1 (09-24-22 @ 23:22)  HR: 76 (09-25-22 @ 15:24) (73 - 77)  BP: 122/65 (09-25-22 @ 15:24) (105/73 - 130/70)  RR: 18 (09-25-22 @ 15:24) (18 - 18)  SpO2: 99% (09-25-22 @ 15:24) (98% - 99%)  Wt(kg): --  I&O's Summary    24 Sep 2022 07:01  -  25 Sep 2022 07:00  --------------------------------------------------------  IN: 720 mL / OUT: 0 mL / NET: 720 mL          Appearance: Normal	  HEENT:  PERRLA   Lymphatic: No lymphadenopathy   Cardiovascular: Normal S1 S2, no JVD  Respiratory: normal effort , clear  Gastrointestinal:  Soft, Non-tender  Skin: No rashes,  warm to touch  Psychiatry:  Mood & affect appropriate  Musculuskeletal: No edema      All labs, Imaging and EKGs personally reviewed       09-24-22 @ 07:01  -  09-25-22 @ 07:00  --------------------------------------------------------  IN: 720 mL / OUT: 0 mL / NET: 720 mL

## 2022-09-25 NOTE — PROGRESS NOTE ADULT - ASSESSMENT
87 year old  Female with a  prior stroke with residual R sided weakness, chronic AF dementia complicated by dysphagia s/p PEG, nonverbal, chronic atrial fibrillation presenting s/p fall.    CTH: volume loss. L frontal and pareital gliosis   CT C spine: no fracture. multilevel degenerative ichagnes   UA+  TTE as above     A1c 5.6  TSH 2.05      Impression: 1) Chronic Stroke embolic stroke likely from AF 2) fall unclear etiology   - rule out infection. f/u urine cx   - should be on AC daily and statin therapy (on simva 40) for secondary stroke prevention if no CI   - unclear why was not on AC; should at least be on asa if unable for AC --> eliquis 2.5mg BID started   - cardiac workup in progress for syncope.  tele  - can check B12, RPR, TSH(WNL)  - unclear if LOC.  can get rEEG in or outpatient  - check orthostatics    - PT/OT   - check FS, glucose control <180  - GI/DVT ppx  - Thank you for allowing me to participate in the care of this patient. Call with questions.   - plan for YESSICA pending auth   Corona Francois MD  Vascular Neurology  Office: [phone]

## 2022-09-25 NOTE — PROVIDER CONTACT NOTE (OTHER) - BACKGROUND
Admitted with H/O fall, r/o syncope, H/O CVA with rt side weakness, Dementia, dysphagia, Chronic a.fib
pmhx CVA, dementia, PEG tube, chronic Afib,

## 2022-09-25 NOTE — PROGRESS NOTE ADULT - SUBJECTIVE AND OBJECTIVE BOX
Neurology Progress Note    S: Patient seen and examined. No new events overnight. doing okay     Medication:    MEDICATIONS  (STANDING):  apixaban 2.5 milliGRAM(s) Oral every 12 hours  ascorbic acid 500 milliGRAM(s) Oral daily  cholecalciferol 1000 Unit(s) Oral daily  digoxin     Tablet 125 MICROGram(s) Oral daily  metoprolol tartrate 12.5 milliGRAM(s) Oral two times a day  montelukast 10 milliGRAM(s) Oral daily  multivitamin 1 Tablet(s) Oral daily  nystatin Powder 1 Application(s) Topical three times a day  simvastatin 40 milliGRAM(s) Oral at bedtime    MEDICATIONS  (PRN):        Vitals:         Vital Signs Last 24 Hrs  T(C): 37.1 (09-24-22 @ 23:22), Max: 37.2 (09-24-22 @ 16:20)  T(F): 98.7 (09-24-22 @ 23:22), Max: 99 (09-24-22 @ 16:20)  HR: 73 (09-24-22 @ 23:22) (73 - 78)  BP: 130/70 (09-24-22 @ 23:22) (118/58 - 130/70)  BP(mean): --  RR: 18 (09-24-22 @ 23:22) (18 - 18)  SpO2: 98% (09-24-22 @ 23:22) (98% - 100%)            General Exam:   General Appearance: Appropriately dressed and in no acute distress       Head: Normocephalic, atraumatic and no dysmorphic features  Ear, Nose, and Throat: Moist mucous membranes  CVS: S1S2+  Resp: No SOB, no wheeze or rhonchi  GI: soft NT/ND  Extremities: No edema or cyanosis  Skin: No bruises or rashes     Neurological Exam:  Mental Status: Awake, alert not following commands, minimal/no verbal   Cranial Nerves: PERRL, EOMI, VFFC, sensation V1-V3 intact, R facial  , equal elevation of palate, scm/trap 5/5, tongue is midline on protrusion. no obvious papilledema on fundoscopic exam. hearing is grossly intact.   Motor: RUE 0/5 contracter RLE 2/5; moving L antigravity   Sensation: withdraws L>R   Reflexes: 1+ throughout at biceps, brachioradialis, triceps, patellars and ankles bilaterally and equal. No clonus. R toe and L toe were both downgoing.  Coordination: unable   Gait: unable     I personally reviewed the below data/images/labs:    no new labs   09-22    137  |  104  |  17  ----------------------------<  138<H>  4.2   |  25  |  0.46<L>    Ca    9.1      22 Sep 2022 11:17  Phos  3.5     09-22  Mg     2.1     09-22          ACC: [id number] EXAM:  CT CERVICAL SPINE                        ACC: [id number] EXAM:  CT BRAIN                          PROCEDURE DATE:  09/19/2022          INTERPRETATION:  CLINICAL INFORMATION: Unwitnessed fall on Eliquis. Found   down with head andneck trauma.    TECHNIQUE: CT of the head was performed in multiple axial sections from   the base of the skull to the vertex with coronal and sagittal reformats.    CT of the cervical spine was performed in bone and soft tissue windows   with coronal and sagittal reformats. No intravenous contrast was   administered. Beam hardening artifact slightly obscures evaluation of the   posterior fossa and brainstem.    COMPARISON: CT of the head from 3/24/2017.    FINDINGS:    Head:  Parenchymal volume loss is noted with prominent ventricles and sulci.   Chronic microvascular ischemic changes are identified. Gliosis and   encephalomalacia is again seen in the left frontal and parietal regions   with ex vacuo dilatation of the left lateral ventricle likely a sequela   of prior infarction. No acute territorial infarct is demonstrated.    There is no evidence of an acute hemorrhage or mass-effect in the   posterior fossa or in the supratentorial region. Senescent bilateral   basal ganglia calcifications are noted.    Evaluation of the osseous structures with the appropriate window appears   unremarkable. Sequela of bilateral lens surgery is seen. The visualized   paranasal sinuses and mastoid air cells are clear.    Cervical spine:  Bones: The cervical vertebral body heights and alignment are maintained.   No fracture or spondylolisthesis is demonstrated. Mild disc space   narrowing is seen at C5-C6 and C6-C7. Small marginal osteophytes are   noted from C4 to C7. Multilevel posterior disc osteophyte complexes are   seen without evidence of severe spinal canal stenosis. Multilevel neural   foraminal stenosis is noted.    Soft tissues: The prevertebral soft tissues are unremarkable. The thyroid   is heterogeneously enlarged possibly due to a goiter. A surgical clip is   seen in the posterior left upper chest soft tissues.    Lung apices: Biapical scarring is noted.    IMPRESSION:  1. No acute intracranial hemorrhage, territorial infarct, mass effect or   calvarial fracture.  2. Multilevel degenerative changes of the cervical spine without evidence   of a fracture.    --- End of Report ---            MARLENI LEVI MD; Attending Radiologist  This document has been electronically signed. Sep 19 2022  2:52AM    < end of copied text >    < from: Transthoracic Echocardiogram (09.21.22 @ 11:06) >    Patient name: CLARISA DAWN  YOB: 1935   Age: 87 (F)   MR#: [id number]  Study Date: 9/21/2022  [facility] Barrie [facility]ae[facility] RDCS  Study quality: difficult due to patient being  Referring Physician: Carter Lowry MD  Blood Pressure: 121/78 mmHg  Height: 152 cm  Weight: 47 kg  BSA: 1.4 m2  ------------------------------------------------------------------------  PROCEDURE: Transthoracic echocardiogram with 2-D, M-Mode  and complete spectral and color flow Doppler.  INDICATION: Syncope and collapse (R55)  ------------------------------------------------------------------------  Dimensions:    Normal Values:  LA:     4.4    2.0 - 4.0 cm  Ao:     3.1    2.0 - 3.8 cm  SEPTUM: 0.9    0.6 - 1.2 cm  PWT:    0.9    0.6 -1.1 cm  LVIDd:  4.0    3.0 - 5.6 cm  LVIDs:  2.3    1.8 - 4.0 cm  Derived variables:  LVMI: 77 g/m2  RWT: 0.45  Fractional short: 43 %  EF (Visual Estimate): 65-70 %  ------------------------------------------------------------------------  Observations:  Mitral Valve: Mitral annular calcification, otherwise  normal mitral valve.  Aortic Valve/Aorta: Calcified trileaflet aortic valve with  normal opening. Unable to obtain gradients across the  aortic valve.  Mild aortic regurgitation.  Aortic Root: 3.1 cm.  Left Ventricle: Normal left ventricular systolic function.  No segmental wall motion abnormalities. Interpretation  based on parasternal views only.  Normal left ventricular  internal dimensions and wall thicknesses. Unable to  evaluate diastolic function  Right Heart: The right ventricle is not well visualized.  Normal tricuspid valve. Minimal tricuspid regurgitation.  Normal pulmonic valve. Mild pulmonic regurgitation.  Pericardium/Pleura: Normal pericardium with no pericardial  effusion.  ------------------------------------------------------------------------  Conclusions:  1. Calcified trileaflet aortic valve with normal opening.  Unable to obtain gradients across the aortic valve.  Mild  aortic regurgitation.  2. Normal left ventricular systolic function. No segmental  wall motion abnormalities. Interpretation based on  parasternal views only.  TTE study terminated early at the request of the patient.  *** No previous Echo exam.  ------------------------------------------------------------------------  Confirmed on  9/21/2022 - 14:31:23 by Denny Scott M.D.  ------------------------------------------------------------------------    < end of copied text >  Orthostatic VS

## 2022-09-26 LAB
ANION GAP SERPL CALC-SCNC: 11 MMOL/L — SIGNIFICANT CHANGE UP (ref 5–17)
BUN SERPL-MCNC: 20 MG/DL — SIGNIFICANT CHANGE UP (ref 7–23)
CALCIUM SERPL-MCNC: 9.6 MG/DL — SIGNIFICANT CHANGE UP (ref 8.4–10.5)
CHLORIDE SERPL-SCNC: 104 MMOL/L — SIGNIFICANT CHANGE UP (ref 96–108)
CO2 SERPL-SCNC: 24 MMOL/L — SIGNIFICANT CHANGE UP (ref 22–31)
CREAT SERPL-MCNC: 0.46 MG/DL — LOW (ref 0.5–1.3)
DIGOXIN SERPL-MCNC: 0.9 NG/ML — SIGNIFICANT CHANGE UP (ref 0.8–2)
EGFR: 94 ML/MIN/1.73M2 — SIGNIFICANT CHANGE UP
GLUCOSE SERPL-MCNC: 127 MG/DL — HIGH (ref 70–99)
HCT VFR BLD CALC: 44.1 % — SIGNIFICANT CHANGE UP (ref 34.5–45)
HGB BLD-MCNC: 14.6 G/DL — SIGNIFICANT CHANGE UP (ref 11.5–15.5)
MAGNESIUM SERPL-MCNC: 2.2 MG/DL — SIGNIFICANT CHANGE UP (ref 1.6–2.6)
MCHC RBC-ENTMCNC: 32.5 PG — SIGNIFICANT CHANGE UP (ref 27–34)
MCHC RBC-ENTMCNC: 33.1 GM/DL — SIGNIFICANT CHANGE UP (ref 32–36)
MCV RBC AUTO: 98.2 FL — SIGNIFICANT CHANGE UP (ref 80–100)
NRBC # BLD: 0 /100 WBCS — SIGNIFICANT CHANGE UP (ref 0–0)
PLATELET # BLD AUTO: 259 K/UL — SIGNIFICANT CHANGE UP (ref 150–400)
POTASSIUM SERPL-MCNC: 4.3 MMOL/L — SIGNIFICANT CHANGE UP (ref 3.5–5.3)
POTASSIUM SERPL-SCNC: 4.3 MMOL/L — SIGNIFICANT CHANGE UP (ref 3.5–5.3)
RBC # BLD: 4.49 M/UL — SIGNIFICANT CHANGE UP (ref 3.8–5.2)
RBC # FLD: 11.9 % — SIGNIFICANT CHANGE UP (ref 10.3–14.5)
SODIUM SERPL-SCNC: 139 MMOL/L — SIGNIFICANT CHANGE UP (ref 135–145)
WBC # BLD: 9.06 K/UL — SIGNIFICANT CHANGE UP (ref 3.8–10.5)
WBC # FLD AUTO: 9.06 K/UL — SIGNIFICANT CHANGE UP (ref 3.8–10.5)

## 2022-09-26 RX ADMIN — APIXABAN 2.5 MILLIGRAM(S): 2.5 TABLET, FILM COATED ORAL at 09:00

## 2022-09-26 RX ADMIN — MONTELUKAST 10 MILLIGRAM(S): 4 TABLET, CHEWABLE ORAL at 12:10

## 2022-09-26 RX ADMIN — NYSTATIN CREAM 1 APPLICATION(S): 100000 CREAM TOPICAL at 17:45

## 2022-09-26 RX ADMIN — Medication 1000 UNIT(S): at 12:10

## 2022-09-26 RX ADMIN — Medication 12.5 MILLIGRAM(S): at 05:31

## 2022-09-26 RX ADMIN — NYSTATIN CREAM 1 APPLICATION(S): 100000 CREAM TOPICAL at 05:30

## 2022-09-26 RX ADMIN — CHLORHEXIDINE GLUCONATE 15 MILLILITER(S): 213 SOLUTION TOPICAL at 05:43

## 2022-09-26 RX ADMIN — Medication 1 TABLET(S): at 12:10

## 2022-09-26 RX ADMIN — Medication 125 MICROGRAM(S): at 05:31

## 2022-09-26 RX ADMIN — NYSTATIN CREAM 1 APPLICATION(S): 100000 CREAM TOPICAL at 14:09

## 2022-09-26 RX ADMIN — NYSTATIN CREAM 1 APPLICATION(S): 100000 CREAM TOPICAL at 20:54

## 2022-09-26 RX ADMIN — Medication 12.5 MILLIGRAM(S): at 17:45

## 2022-09-26 RX ADMIN — Medication 500 MILLIGRAM(S): at 12:10

## 2022-09-26 RX ADMIN — CHLORHEXIDINE GLUCONATE 15 MILLILITER(S): 213 SOLUTION TOPICAL at 17:45

## 2022-09-26 RX ADMIN — APIXABAN 2.5 MILLIGRAM(S): 2.5 TABLET, FILM COATED ORAL at 20:53

## 2022-09-26 RX ADMIN — SIMVASTATIN 40 MILLIGRAM(S): 20 TABLET, FILM COATED ORAL at 20:54

## 2022-09-26 NOTE — PROGRESS NOTE ADULT - SUBJECTIVE AND OBJECTIVE BOX
SUBJECTIVE/ OVERNIGHT EVENTS:  --- Coverage for Dr. Lowry ---  brief SVT overnight.   awake but forgetful  poor historian  tolerating PEG feeding.       --------------------------------------------------------------------------------------------  LABS:            CAPILLARY BLOOD GLUCOSE      POCT Blood Glucose.: 155 mg/dL (26 Sep 2022 07:58)  POCT Blood Glucose.: 121 mg/dL (26 Sep 2022 00:11)  POCT Blood Glucose.: 129 mg/dL (25 Sep 2022 18:06)  POCT Blood Glucose.: 124 mg/dL (25 Sep 2022 11:54)            RADIOLOGY & ADDITIONAL TESTS:    Imaging Personally Reviewed:  [x] YES  [ ] NO    Consultant(s) Notes Reviewed:  [x] YES  [ ] NO    MEDICATIONS  (STANDING):  apixaban 2.5 milliGRAM(s) Oral every 12 hours  ascorbic acid 500 milliGRAM(s) Oral daily  chlorhexidine 0.12% Liquid 15 milliLiter(s) Oral Mucosa two times a day  cholecalciferol 1000 Unit(s) Oral daily  digoxin     Tablet 125 MICROGram(s) Oral daily  metoprolol tartrate 12.5 milliGRAM(s) Oral two times a day  montelukast 10 milliGRAM(s) Oral daily  multivitamin 1 Tablet(s) Oral daily  nystatin Powder 1 Application(s) Topical three times a day  nystatin Powder 1 Application(s) Topical two times a day  simvastatin 40 milliGRAM(s) Oral at bedtime    MEDICATIONS  (PRN):      Care Discussed with Consultants/Other Providers [x] YES  [ ] NO    Vital Signs Last 24 Hrs  T(C): 36.8 (25 Sep 2022 23:30), Max: 36.9 (25 Sep 2022 21:23)  T(F): 98.3 (25 Sep 2022 23:30), Max: 98.4 (25 Sep 2022 21:23)  HR: 74 (25 Sep 2022 23:30) (74 - 92)  BP: 138/77 (25 Sep 2022 23:30) (105/73 - 138/77)  BP(mean): --  RR: 18 (25 Sep 2022 23:30) (18 - 18)  SpO2: 98% (25 Sep 2022 23:30) (97% - 99%)    Parameters below as of 25 Sep 2022 23:30  Patient On (Oxygen Delivery Method): room air      I&O's Summary    25 Sep 2022 07:01  -  26 Sep 2022 07:00  --------------------------------------------------------  IN: 600 mL / OUT: 0 mL / NET: 600 mL        PHYSICAL EXAM:  GENERAL: NAD, thin-elderly, comfortable  HEAD:  Atraumatic, Normocephalic  EYES: EOMI, PERRLA, conjunctiva and sclera clear  NECK: Supple, No JVD  CHEST/LUNG: mild decrease breath sounds bilaterally; No wheeze   HEART: Regular rate and rhythm; No murmurs, rubs, or gallops  ABDOMEN: Soft, Nontender, Nondistended; Bowel sounds present, PEG in place  Neuro: Awake, advanced dementia.  EXTREMITIES:  2+ Peripheral Pulses, No clubbing, cyanosis, or edema  SKIN: No rashes or lesions

## 2022-09-26 NOTE — PROGRESS NOTE ADULT - ASSESSMENT
87 year old Female with a PMHx of CVA with residual R sided weakness, dementia complicated by dysphagia s/p PEG, nonverbal, chronic atrial fibrillation off eliquis, presenting s/p fall. Patient was found down @9PM from the chair that she was placed on at 6PM. Unknown LOC or head trauma. Patient's son at bedside states unchanged mental status, but having decreased ability to walk 2/2 possible weakness. Patient unable to verbalize pain anywhere.      Mechanical Fall  - CT Head negative for acute findings  - CT Cervical spine negative for acute fracture  - Xrays --  negative for acute fractures    - PT/OT Consult  - TTE -- As noted wit EF of 65-70%  - Check Orthostatics   - Fall and Aspiration precautions  - Neuro consulted; F/u recs  - Pt will require outpatient follow up with Neuro as an outpatient for rEEG     Abnormal UA (though unimpressive, no leuke, no nitrite)  - Urine culture --> negative   - Monitor CBC, Temp curve, VS and patient closely     CVA  - With R sided residual weakness  - CT head negative for acute findings  - C/w PEG tube feedings, tolerating    Afib  - C/w Digoxin   - C/w Zocor   - Have asked Pharmacy at [phone] to follow up with Med rec  - C/w Low dose Eliquis 2.5 BID, Patient is bedbound; low risk for recurrent fall   - C/w lopressor 12.5 BID; monitor patient closely    Dementia  - Fall and Aspiration precautions  - SonStas unsure of patient is on VPA or Trazadone. Have asked pharmacy to obtain Med rec    PPX  - PPI via PEG and Elqiuis 2.5 BID     D/C planning underway awaiting placement/ authorization for rehab.  Brief NSVT overnight, check BMP, Mg, Dig level for electrolytes.

## 2022-09-26 NOTE — PROGRESS NOTE ADULT - SUBJECTIVE AND OBJECTIVE BOX
Cardiovascular Disease Progress Note  Covering for Dr. Rizo    Overnight events: No acute events overnight.  Ms. Garza is in no distress.   Otherwise review of systems negative    Objective Findings:  T(C): 36.8 (09-25-22 @ 23:30), Max: 36.9 (09-25-22 @ 21:23)  HR: 74 (09-25-22 @ 23:30) (74 - 92)  BP: 138/77 (09-25-22 @ 23:30) (105/73 - 138/77)  RR: 18 (09-25-22 @ 23:30) (18 - 18)  SpO2: 98% (09-25-22 @ 23:30) (97% - 99%)  Wt(kg): --  Daily     Daily       Physical Exam:  Gen: NAD; Patient resting comfortably  HEENT: EOMI, Normocephalic/ atraumatic  CV: RRR, normal S1 + S2, no m/r/g  Lungs:  Normal respiratory effort; clear to auscultation bilaterally  Abd: soft, non-tender; bowel sounds present  Ext: No edema; warm and well perfused    Telemetry:  Sinus with short run ot PAT.     Laboratory Data:                    Inpatient Medications:  MEDICATIONS  (STANDING):  apixaban 2.5 milliGRAM(s) Oral every 12 hours  ascorbic acid 500 milliGRAM(s) Oral daily  chlorhexidine 0.12% Liquid 15 milliLiter(s) Oral Mucosa two times a day  cholecalciferol 1000 Unit(s) Oral daily  digoxin     Tablet 125 MICROGram(s) Oral daily  metoprolol tartrate 12.5 milliGRAM(s) Oral two times a day  montelukast 10 milliGRAM(s) Oral daily  multivitamin 1 Tablet(s) Oral daily  nystatin Powder 1 Application(s) Topical three times a day  nystatin Powder 1 Application(s) Topical two times a day  simvastatin 40 milliGRAM(s) Oral at bedtime      Assessment:  87 year old Female with a PMHx of CVA with residual R sided weakness, dementia complicated by dysphagia s/p PEG placent, nonverbal, chronic atrial fibrillation presenting s/p fall.     Problem/Plan #1  Problem: s/p Fall  Plan: r/o syncope  - EKG at baseline with no ischemic changes noted  - CT Head with no acute intracranial hemorrhage infarct, mass or fracture  - TTE shows preserved EF, normal LV systolic function, no WMA  - Monitor on tele- currently SR  - PT consult    Problem/Plan #2  Problem: Chronic Atrial Fibrillation  Plan: c/w Digoxin  - c/w Apixaban 2.5mg PO BID  - c/w Metoprolol 12.5mg PO BID    Problem/Plan #3  Problem: CVA  Plan:  - PT consult  - s/p PEG: c/w tube feedings    #ACP (advance care planning)-  Advanced care planning was discussed with the patient.   Risks, benefits and alternatives of medical treatment and procedures were discussed in detail and all questions were answered. 30 minutes spent addressing advance care plans.          Over 25 minutes spent on total encounter; more than 50% of the visit was spent counseling and/or coordinating care by the attending physician.      Travis Melendez D.O.   Cardiovascular Disease  [phone] Cardiovascular Disease Progress Note  Covering for Dr. Rizo    Overnight events: No acute events overnight.  Ms. Garza is in no distress.   Otherwise review of systems negative    Objective Findings:  T(C): 36.8 (09-25-22 @ 23:30), Max: 36.9 (09-25-22 @ 21:23)  HR: 74 (09-25-22 @ 23:30) (74 - 92)  BP: 138/77 (09-25-22 @ 23:30) (105/73 - 138/77)  RR: 18 (09-25-22 @ 23:30) (18 - 18)  SpO2: 98% (09-25-22 @ 23:30) (97% - 99%)  Wt(kg): --  Daily     Daily       Physical Exam:  Gen: NAD; Patient resting comfortably  HEENT: EOMI, Normocephalic/ atraumatic  CV: RRR, normal S1 + S2, no m/r/g  Lungs:  Normal respiratory effort; clear to auscultation bilaterally  Abd: soft, non-tender; bowel sounds present  Ext: No edema; warm and well perfused    Telemetry:  Sinus with short run ot PAT.     Laboratory Data:                    Inpatient Medications:  MEDICATIONS  (STANDING):  apixaban 2.5 milliGRAM(s) Oral every 12 hours  ascorbic acid 500 milliGRAM(s) Oral daily  chlorhexidine 0.12% Liquid 15 milliLiter(s) Oral Mucosa two times a day  cholecalciferol 1000 Unit(s) Oral daily  digoxin     Tablet 125 MICROGram(s) Oral daily  metoprolol tartrate 12.5 milliGRAM(s) Oral two times a day  montelukast 10 milliGRAM(s) Oral daily  multivitamin 1 Tablet(s) Oral daily  nystatin Powder 1 Application(s) Topical three times a day  nystatin Powder 1 Application(s) Topical two times a day  simvastatin 40 milliGRAM(s) Oral at bedtime      Assessment:  87 year old Female with a PMHx of CVA with residual R sided weakness, dementia complicated by dysphagia s/p PEG placent, nonverbal, chronic atrial fibrillation presenting s/p fall.     Problem/Plan #1  Problem: s/p Fall  Plan: r/o syncope  - EKG at baseline with no ischemic changes noted  - CT Head with no acute intracranial hemorrhage infarct, mass or fracture  - TTE shows preserved EF, normal LV systolic function, no WMA  - Monitor on tele- currently SR  - PT consult    Problem/Plan #2  Problem: Chronic Atrial Fibrillation  Plan: c/w Digoxin  - c/w Apixaban 2.5mg PO BID  - c/w Metoprolol 12.5mg PO BID    Problem/Plan #3  Problem: CVA  Plan:  - PT consult  - s/p PEG: c/w tube feedings    #ACP (advance care planning)-  Advanced care planning was discussed.   Risks, benefits and alternatives of medical treatment and procedures were addressed. 30 minutes spent addressing advance care plans.          Over 25 minutes spent on total encounter; more than 50% of the visit was spent counseling and/or coordinating care by the attending physician.      Travis Melendez D.O.   Cardiovascular Disease  [phone]

## 2022-09-27 LAB
BASOPHILS # BLD AUTO: 0.07 K/UL — SIGNIFICANT CHANGE UP (ref 0–0.2)
BASOPHILS NFR BLD AUTO: 0.9 % — SIGNIFICANT CHANGE UP (ref 0–2)
EOSINOPHIL # BLD AUTO: 0.37 K/UL — SIGNIFICANT CHANGE UP (ref 0–0.5)
EOSINOPHIL NFR BLD AUTO: 4.6 % — SIGNIFICANT CHANGE UP (ref 0–6)
HCT VFR BLD CALC: 44.6 % — SIGNIFICANT CHANGE UP (ref 34.5–45)
HGB BLD-MCNC: 14.6 G/DL — SIGNIFICANT CHANGE UP (ref 11.5–15.5)
IMM GRANULOCYTES NFR BLD AUTO: 0.2 % — SIGNIFICANT CHANGE UP (ref 0–0.9)
LYMPHOCYTES # BLD AUTO: 1.88 K/UL — SIGNIFICANT CHANGE UP (ref 1–3.3)
LYMPHOCYTES # BLD AUTO: 23.4 % — SIGNIFICANT CHANGE UP (ref 13–44)
MCHC RBC-ENTMCNC: 31.9 PG — SIGNIFICANT CHANGE UP (ref 27–34)
MCHC RBC-ENTMCNC: 32.7 GM/DL — SIGNIFICANT CHANGE UP (ref 32–36)
MCV RBC AUTO: 97.6 FL — SIGNIFICANT CHANGE UP (ref 80–100)
MONOCYTES # BLD AUTO: 0.83 K/UL — SIGNIFICANT CHANGE UP (ref 0–0.9)
MONOCYTES NFR BLD AUTO: 10.3 % — SIGNIFICANT CHANGE UP (ref 2–14)
NEUTROPHILS # BLD AUTO: 4.85 K/UL — SIGNIFICANT CHANGE UP (ref 1.8–7.4)
NEUTROPHILS NFR BLD AUTO: 60.6 % — SIGNIFICANT CHANGE UP (ref 43–77)
NRBC # BLD: 0 /100 WBCS — SIGNIFICANT CHANGE UP (ref 0–0)
PLATELET # BLD AUTO: 245 K/UL — SIGNIFICANT CHANGE UP (ref 150–400)
RBC # BLD: 4.57 M/UL — SIGNIFICANT CHANGE UP (ref 3.8–5.2)
RBC # FLD: 12 % — SIGNIFICANT CHANGE UP (ref 10.3–14.5)
WBC # BLD: 8.02 K/UL — SIGNIFICANT CHANGE UP (ref 3.8–10.5)
WBC # FLD AUTO: 8.02 K/UL — SIGNIFICANT CHANGE UP (ref 3.8–10.5)

## 2022-09-27 PROCEDURE — 99221 1ST HOSP IP/OBS SF/LOW 40: CPT | Mod: GC

## 2022-09-27 RX ORDER — VALPROIC ACID (AS SODIUM SALT) 250 MG/5ML
250 SOLUTION, ORAL ORAL DAILY
Refills: 0 | Status: DISCONTINUED | OUTPATIENT
Start: 2022-09-27 | End: 2022-09-28

## 2022-09-27 RX ADMIN — CHLORHEXIDINE GLUCONATE 15 MILLILITER(S): 213 SOLUTION TOPICAL at 18:10

## 2022-09-27 RX ADMIN — Medication 12.5 MILLIGRAM(S): at 06:48

## 2022-09-27 RX ADMIN — APIXABAN 2.5 MILLIGRAM(S): 2.5 TABLET, FILM COATED ORAL at 10:07

## 2022-09-27 RX ADMIN — MONTELUKAST 10 MILLIGRAM(S): 4 TABLET, CHEWABLE ORAL at 12:36

## 2022-09-27 RX ADMIN — Medication 500 MILLIGRAM(S): at 12:36

## 2022-09-27 RX ADMIN — NYSTATIN CREAM 1 APPLICATION(S): 100000 CREAM TOPICAL at 06:51

## 2022-09-27 RX ADMIN — NYSTATIN CREAM 1 APPLICATION(S): 100000 CREAM TOPICAL at 06:49

## 2022-09-27 RX ADMIN — NYSTATIN CREAM 1 APPLICATION(S): 100000 CREAM TOPICAL at 14:43

## 2022-09-27 RX ADMIN — Medication 250 MILLIGRAM(S): at 18:09

## 2022-09-27 RX ADMIN — CHLORHEXIDINE GLUCONATE 15 MILLILITER(S): 213 SOLUTION TOPICAL at 06:49

## 2022-09-27 RX ADMIN — APIXABAN 2.5 MILLIGRAM(S): 2.5 TABLET, FILM COATED ORAL at 21:12

## 2022-09-27 RX ADMIN — Medication 1000 UNIT(S): at 12:36

## 2022-09-27 RX ADMIN — Medication 1 TABLET(S): at 12:35

## 2022-09-27 RX ADMIN — Medication 125 MICROGRAM(S): at 06:50

## 2022-09-27 RX ADMIN — NYSTATIN CREAM 1 APPLICATION(S): 100000 CREAM TOPICAL at 21:12

## 2022-09-27 RX ADMIN — NYSTATIN CREAM 1 APPLICATION(S): 100000 CREAM TOPICAL at 18:10

## 2022-09-27 RX ADMIN — SIMVASTATIN 40 MILLIGRAM(S): 20 TABLET, FILM COATED ORAL at 21:12

## 2022-09-27 RX ADMIN — Medication 12.5 MILLIGRAM(S): at 18:09

## 2022-09-27 NOTE — CONSULT NOTE ADULT - ATTENDING COMMENTS
GI consulted for possible peg tube site infection given ?malodor.  No issues with use of peg tube.   Please obtain ct abd/pelvis for further evaluation.   GI to follow, please call with questions.

## 2022-09-27 NOTE — PROVIDER CONTACT NOTE (OTHER) - REASON
PEG site erythematous, foul smelling, and pt itching at site
Redness noted above PEG site. PEG tube - slush like formation inside tube noted
Teletech Nilsa called at 18:55 and reported, patient had SVT with HR in 170's at 2PM for 4 seconds.
15 seconds PSVT 150 BPM

## 2022-09-27 NOTE — CHART NOTE - NSCHARTNOTEFT_GEN_A_CORE
MEDICINE NP- EPISODIC NOTE    Called by RN to assess pt PEG site. PEG not new, last exchanged May 2022. Mild erythema w/o swelling or drainage. Pt scratches at site. PT afebrile, no leukocytosis since admission. Trend CBC.    Juanis Broderick, NP-BC  [id number]
Patient with Peg tube site concerning for infection ( redness/foul smelling) GI consulted, abdominal binder applied, TF infusion without complication; abd soft nt/nd, bsx4q. Ct a/p w/cont ordered to r/o infection as advised by GI. Per patient's son Stas Chapin " pt do not have any allergies to medications, food or IV contrast; plan d/w Dr. Cody.
Medicine NP note    CC:   Unsustained SVT  Notified by RN at 8 pm that patient Had 4 seconds of SVT at 2 PM   Evaluated the pt at  bedside  Pt asymptomatic  Tele reviewed, SR at baseline, HR in 80's    VSS    A/P   87 year old Female with a PMHx of CVA with residual R sided weakness, dementia complicated by dysphagia s/p PEG placent, nonverbal, chronic atrial fibrillation presenting s/p fall,  r/o syncope  - CT Head with no acute intracranial hemorrhage infarct, mass or fracture  - TTE shows preserved EF, normal LV systolic function, no WMA  Chronic afib,    Digoxin, Apixaban 2.5mg PO BID, Metoprolol 12.5mg PO BID  Had previous episode pof SVT  Now with 4 seconds of SVT  asymptomatic  C/W BB  F/U cardiology am  Will sign out to day team    Shagufta Jade FNP-BC, AGAP-BC  department of Medicine  [id number]

## 2022-09-27 NOTE — PROGRESS NOTE ADULT - SUBJECTIVE AND OBJECTIVE BOX
Cardiovascular Disease Progress Note Covering for Dr. Rizo    Overnight events: No acute events overnight.  Pt is in no distress.   Otherwise review of systems negative    Objective Findings:  T(C): 36.9 (09-27-22 @ 00:24), Max: 36.9 (09-27-22 @ 00:24)  HR: 75 (09-27-22 @ 06:54) (70 - 80)  BP: 126/76 (09-27-22 @ 06:54) (126/76 - 146/61)  RR: 18 (09-27-22 @ 00:24) (18 - 18)  SpO2: 100% (09-27-22 @ 00:24) (97% - 100%)  Wt(kg): --  Daily     Daily       Physical Exam:  Gen: NAD; Patient resting comfortably  HEENT: EOMI, Normocephalic/ atraumatic  CV: RRR, normal S1 + S2, no m/r/g  Lungs:  Normal respiratory effort; clear to auscultation bilaterally  Abd: soft, non-tender; bowel sounds present, Peg site is erythematous  Ext: No edema; warm and well perfused    Telemetry: Sinus     Laboratory Data:                        14.6   8.02  )-----------( 245      ( 27 Sep 2022 07:18 )             44.6     09-26    139  |  104  |  20  ----------------------------<  127<H>  4.3   |  24  |  0.46<L>    Ca    9.6      26 Sep 2022 11:55  Mg     2.2     09-26                Inpatient Medications:  MEDICATIONS  (STANDING):  apixaban 2.5 milliGRAM(s) Oral every 12 hours  ascorbic acid 500 milliGRAM(s) Oral daily  chlorhexidine 0.12% Liquid 15 milliLiter(s) Oral Mucosa two times a day  cholecalciferol 1000 Unit(s) Oral daily  digoxin     Tablet 125 MICROGram(s) Oral daily  metoprolol tartrate 12.5 milliGRAM(s) Oral two times a day  montelukast 10 milliGRAM(s) Oral daily  multivitamin 1 Tablet(s) Oral daily  nystatin Powder 1 Application(s) Topical two times a day  nystatin Powder 1 Application(s) Topical three times a day  simvastatin 40 milliGRAM(s) Oral at bedtime       Assessment:  87 year old Female with a PMHx of CVA with residual R sided weakness, dementia complicated by dysphagia s/p PEG placent, nonverbal, chronic atrial fibrillation presenting s/p fall.     Problem/Plan #1  Problem: s/p Fall  Plan: r/o syncope  - EKG at baseline with no ischemic changes noted  - CT Head with no acute intracranial hemorrhage infarct, mass or fracture  - TTE shows preserved EF, normal LV systolic function, no WMA  - Monitor on tele- currently SR  - PT consult    Problem/Plan #2  Problem: Chronic Atrial Fibrillation  Plan: c/w Digoxin  - c/w Apixaban 2.5mg PO BID  - c/w Metoprolol 12.5mg PO BID    Problem/Plan #3  Problem: CVA  Plan:  - PT consult  - s/p PEG: c/w tube feedings  - Possible infection at PEG site  - Management as per primary team       Plan of Care:          Over 25 minutes spent on total encounter; more than 50% of the visit was spent counseling and/or coordinating care by the attending physician.      Travis Melendez D.O.   Cardiovascular Disease  [phone]

## 2022-09-27 NOTE — CONSULT NOTE ADULT - ASSESSMENT
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  Unable to obtain full HPI due to underlying mental status.  GI consulted for concern for possible PEG site infection.  Patient has history of PEG tube placement, earliest in our EMR in 2018.  PEG exchanged most recently via endoscopy in April 2021 with 20F externally removable PEG tube.  PEG tubing exchanged on 5/24/2022 with sterile water balloon.  On exam, the PEG tube is freely moveable with no suspicion for buried bumper.  No active drainage or fluctuance appreciated surrounding PEG site.  Low suspicion for PEG site infection at this time.    # History of PEG    Recommendations:  -trend clinical symptoms, exam findings, vital signs, CBC, CMP, INR  -Nutrition consult for recommendations on enteral feeds  -keep bumper clean and dry, would avoid gauze between the PEG bumper and skin  -maintain aspiration precautions and elevate head of bed during feeds  -close observation to prevent PEG dislodgement, would recommend abdominal binder for protection/prevention of PEG dislodgment  -consider CT A/P with IV contrast if concern for PEG site infection to rule out underlying abscess    Note incomplete until finalized by attending signature/attestation.    Hilton Tracy  GI/Hepatology Fellow    MONDAY-FRIDAY 8AM-5PM:  Pager# [phone] ([facility]) or [phone] ([facility])    NON-URGENT CONSULTS:  Please email [email or web address]  AT NIGHT AND ON WEEKENDS:  Contact on-call GI fellow via answering service ([phone]) from 5pm-8am and on weekends/holidays          
87 year old  Female with a  prior stroke with residual R sided weakness, chronic AF dementia complicated by dysphagia s/p PEG, nonverbal, chronic atrial fibrillation presenting s/p fall.    CTH: volume loss. L frontal and pareital gliosis   CT C spine: no fracture. multilevel degenerative ichagnes   UA+    Impression: 1) Chronic Stroke embolic stroke likely from AF 2) fall unclear etiology   - rule out infection. f/u urine cx   - should be on AC daily and statin therapy (on simva 40) for secondary stroke prevention if no CI   - unclear why no AC; should at least be on asa if unable for AC   - cardiac workup in progress for syncope.  tele/TTE  - can check B12, RPR, TSH  - unclear if LOC.  can get rEEG in or outpatient  - check orthostatics    - Hemoglobin A1c and lipid panel  - PT/OT   - check FS, glucose control <180  - GI/DVT ppx  - Counseling on diet, exercise, and medication adherence was done  - Counseling on smoking cessation and alcohol consumption offered when appropriate.  - Pain assessed and judicious use of narcotics when appropriate was discussed.    - Stroke education given when appropriate.  - Importance of fall prevention discussed.   - Differential diagnosis and plan of care discussed with patient and/or family and primary team  - Thank you for allowing me to participate in the care of this patient. Call with questions.   Corona Francois MD  Vascular Neurology  Office: [phone]

## 2022-09-27 NOTE — PROVIDER CONTACT NOTE (OTHER) - ASSESSMENT
patient is nonverbal, no s/s of chest pain or distress. vital signs stable and documented
Patient resting comfortably at this time, HR in 80's- 90's
PEG site assessed, appears infected, pt uncomfortable and itching at the site present

## 2022-09-27 NOTE — CONSULT NOTE ADULT - SUBJECTIVE AND OBJECTIVE BOX
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    Patient presents to [facility] and hospital course c/b fall, found down, unknown LOC or head trauma, and weakness.  Unable to obtain full HPI due to underlying mental status.  GI consulted for concern for possible PEG site infection.  Patient has history of PEG tube placement, earliest in our EMR in 2018.  PEG exchanged most recently via endoscopy in April 2021 with 20F externally removable PEG tube.  PEG tubing exchanged on 5/24/2022 with sterile water balloon.    ROS:   Unable to obtain full ROS due to underlying mental status.    PMHX/PSHX:    CVA (cerebral vascular accident)    PEG tube malfunction    No significant past surgical history      Allergies:  No Known Allergies      Home Medications: reviewed  Hospital Medications:  apixaban 2.5 milliGRAM(s) Oral every 12 hours  ascorbic acid 500 milliGRAM(s) Oral daily  chlorhexidine 0.12% Liquid 15 milliLiter(s) Oral Mucosa two times a day  cholecalciferol 1000 Unit(s) Oral daily  digoxin     Tablet 125 MICROGram(s) Oral daily  metoprolol tartrate 12.5 milliGRAM(s) Oral two times a day  montelukast 10 milliGRAM(s) Oral daily  multivitamin 1 Tablet(s) Oral daily  nystatin Powder 1 Application(s) Topical three times a day  nystatin Powder 1 Application(s) Topical two times a day  simvastatin 40 milliGRAM(s) Oral at bedtime      Social History:   Unable to obtain due to underlying mental status.    Family history:    No pertinent family history in first degree relatives        PHYSICAL EXAM:   Vital Signs:  Vital Signs Last 24 Hrs  T(C): 36.3 (27 Sep 2022 08:59), Max: 36.9 (27 Sep 2022 00:24)  T(F): 97.4 (27 Sep 2022 08:59), Max: 98.4 (27 Sep 2022 00:24)  HR: 89 (27 Sep 2022 08:59) (70 - 89)  BP: 132/67 (27 Sep 2022 08:59) (126/76 - 146/61)  BP(mean): --  RR: 18 (27 Sep 2022 08:59) (18 - 18)  SpO2: 93% (27 Sep 2022 08:59) (93% - 100%)    Parameters below as of 27 Sep 2022 08:59  Patient On (Oxygen Delivery Method): room air      Daily     Daily     GENERAL: no acute distress  NEURO: not fully alert/oriented  HEENT: NCAT, no conjunctival pallor appreciated  CHEST: no respiratory distress, no accessory muscle use  CARDIAC: regular rate, +S1/S2  ABDOMEN: PEG tube freely movable, no active drainage or fluctuance surrounding site, soft, nontender, no rebound or guarding  EXTREMITIES: warm, well perfused  SKIN: no lesions noted    LABS: reviewed                        14.6   8.02  )-----------( 245      ( 27 Sep 2022 07:18 )             44.6     09-26    139  |  104  |  20  ----------------------------<  127<H>  4.3   |  24  |  0.46<L>    Ca    9.6      26 Sep 2022 11:55  Mg     2.2     09-26            Diagnostic Studies: see sunrise for full report

## 2022-09-27 NOTE — PROGRESS NOTE ADULT - SUBJECTIVE AND OBJECTIVE BOX
SUBJECTIVE/ OVERNIGHT EVENTS:  --- Coverage for Dr. Lowry ---  awake alert   calm  RN report feeding tube site smell and loose bumper  no open wounds  GI to look. last PEG exchanged back in May 2022.  stable hemodynamics.  --------------------------------------------------------------------------------------------  LABS:                        14.6   8.02  )-----------( 245      ( 27 Sep 2022 07:18 )             44.6     09-26    139  |  104  |  20  ----------------------------<  127<H>  4.3   |  24  |  0.46<L>    Ca    9.6      26 Sep 2022 11:55  Mg     2.2     09-26        CAPILLARY BLOOD GLUCOSE      POCT Blood Glucose.: 117 mg/dL (27 Sep 2022 06:08)  POCT Blood Glucose.: 118 mg/dL (27 Sep 2022 00:22)  POCT Blood Glucose.: 129 mg/dL (26 Sep 2022 21:09)  POCT Blood Glucose.: 134 mg/dL (26 Sep 2022 17:01)  POCT Blood Glucose.: 130 mg/dL (26 Sep 2022 12:02)            RADIOLOGY & ADDITIONAL TESTS:    Imaging Personally Reviewed:  [x] YES  [ ] NO    Consultant(s) Notes Reviewed:  [x] YES  [ ] NO    MEDICATIONS  (STANDING):  apixaban 2.5 milliGRAM(s) Oral every 12 hours  ascorbic acid 500 milliGRAM(s) Oral daily  chlorhexidine 0.12% Liquid 15 milliLiter(s) Oral Mucosa two times a day  cholecalciferol 1000 Unit(s) Oral daily  digoxin     Tablet 125 MICROGram(s) Oral daily  metoprolol tartrate 12.5 milliGRAM(s) Oral two times a day  montelukast 10 milliGRAM(s) Oral daily  multivitamin 1 Tablet(s) Oral daily  nystatin Powder 1 Application(s) Topical two times a day  nystatin Powder 1 Application(s) Topical three times a day  simvastatin 40 milliGRAM(s) Oral at bedtime    MEDICATIONS  (PRN):      Care Discussed with Consultants/Other Providers [x] YES  [ ] NO    Vital Signs Last 24 Hrs  T(C): 36.3 (27 Sep 2022 08:59), Max: 36.9 (27 Sep 2022 00:24)  T(F): 97.4 (27 Sep 2022 08:59), Max: 98.4 (27 Sep 2022 00:24)  HR: 89 (27 Sep 2022 08:59) (70 - 89)  BP: 132/67 (27 Sep 2022 08:59) (126/76 - 146/61)  BP(mean): --  RR: 18 (27 Sep 2022 08:59) (18 - 18)  SpO2: 93% (27 Sep 2022 08:59) (93% - 100%)    Parameters below as of 27 Sep 2022 08:59  Patient On (Oxygen Delivery Method): room air      I&O's Summary      PHYSICAL EXAM:  GENERAL: NAD, thin-elderly, comfortable  HEAD:  Atraumatic, Normocephalic  EYES: EOMI, PERRLA, conjunctiva and sclera clear  NECK: Supple, No JVD  CHEST/LUNG: mild decrease breath sounds bilaterally; No wheeze   HEART: Regular rate and rhythm; No murmurs, rubs, or gallops  ABDOMEN: Soft, Nontender, Nondistended; Bowel sounds present, PEG in place, dressing d/c/i, no pus. loose bumper with underlying skin mild erythema  Neuro: Awake, advanced dementia.  EXTREMITIES:  2+ Peripheral Pulses, No clubbing, cyanosis, or edema  SKIN: No rashes or lesions

## 2022-09-27 NOTE — PROGRESS NOTE ADULT - ASSESSMENT
87 year old Female with a PMHx of CVA with residual R sided weakness, dementia complicated by dysphagia s/p PEG, nonverbal, chronic atrial fibrillation off eliquis, presenting s/p fall. Patient was found down @9PM from the chair that she was placed on at 6PM. Unknown LOC or head trauma. Patient's son at bedside states unchanged mental status, but having decreased ability to walk 2/2 possible weakness. Patient unable to verbalize pain anywhere.    Mechanical Fall  - CT Head negative for acute findings  - CT Cervical spine negative for acute fracture  - Xrays --  negative for acute fractures    - PT/OT Consult  - TTE -- As noted wit EF of 65-70%  - Check Orthostatics   - Fall and Aspiration precautions  - Neuro consulted; F/u recs  - Pt will require outpatient follow up with Neuro as an outpatient for rEEG     Abnormal UA (though unimpressive, no leuke, no nitrite)  - Urine culture --> negative   - Monitor CBC, Temp curve, VS and patient closely     CVA  - With R sided residual weakness  - CT head negative for acute findings  - C/w PEG tube feedings, tolerating    Afib  - C/w Digoxin   - C/w Zocor   - Have asked Pharmacy at [phone] to follow up with Med rec  - C/w Low dose Eliquis 2.5 BID, Patient is bedbound; low risk for recurrent fall   - C/w lopressor 12.5 BID; monitor patient closely    Dementia  - Fall and Aspiration precautions  - SonStas unsure of patient is on VPA or Trazadone. Have asked pharmacy to obtain Med rec    PPX  - PPI via PEG and Elqiuis 2.5 BID     Brief NSVT overnight, Dig level nontoxic and electrolytes normal. c/w current regimen per card.     Feeding tube check:  RN report feeding tube site smell and loose bumper  no open wounds. GI to look. last PEG exchanged back in May 2022.    D/C planning underway awaiting placement/ authorization for rehab.

## 2022-09-27 NOTE — PROVIDER CONTACT NOTE (OTHER) - SITUATION
As above
patient currently on tele monitoring, notification for 15 seconds of PSVT at 150 BPM
PEG site erythematous, foul smelling, and pt itching at the site

## 2022-09-27 NOTE — PROVIDER CONTACT NOTE (OTHER) - ACTION/TREATMENT ORDERED:
Provider made aware of the situation, as per provider, no new orders at this time. Provider made aware of the situation, provider assessed the pt, as per provider, no new orders at this time.

## 2022-09-28 ENCOUNTER — TRANSCRIPTION ENCOUNTER (OUTPATIENT)
Age: 87
End: 2022-09-28

## 2022-09-28 VITALS
SYSTOLIC BLOOD PRESSURE: 120 MMHG | RESPIRATION RATE: 18 BRPM | OXYGEN SATURATION: 97 % | DIASTOLIC BLOOD PRESSURE: 69 MMHG | HEART RATE: 73 BPM | TEMPERATURE: 98 F

## 2022-09-28 PROCEDURE — 36415 COLL VENOUS BLD VENIPUNCTURE: CPT

## 2022-09-28 PROCEDURE — U0005: CPT

## 2022-09-28 PROCEDURE — G1004: CPT

## 2022-09-28 PROCEDURE — 73552 X-RAY EXAM OF FEMUR 2/>: CPT

## 2022-09-28 PROCEDURE — 99285 EMERGENCY DEPT VISIT HI MDM: CPT

## 2022-09-28 PROCEDURE — 97530 THERAPEUTIC ACTIVITIES: CPT

## 2022-09-28 PROCEDURE — 84100 ASSAY OF PHOSPHORUS: CPT

## 2022-09-28 PROCEDURE — 87086 URINE CULTURE/COLONY COUNT: CPT

## 2022-09-28 PROCEDURE — 87635 SARS-COV-2 COVID-19 AMP PRB: CPT

## 2022-09-28 PROCEDURE — 84443 ASSAY THYROID STIM HORMONE: CPT

## 2022-09-28 PROCEDURE — 81001 URINALYSIS AUTO W/SCOPE: CPT

## 2022-09-28 PROCEDURE — 83735 ASSAY OF MAGNESIUM: CPT

## 2022-09-28 PROCEDURE — 82962 GLUCOSE BLOOD TEST: CPT

## 2022-09-28 PROCEDURE — 97110 THERAPEUTIC EXERCISES: CPT

## 2022-09-28 PROCEDURE — 71045 X-RAY EXAM CHEST 1 VIEW: CPT

## 2022-09-28 PROCEDURE — 85027 COMPLETE CBC AUTOMATED: CPT

## 2022-09-28 PROCEDURE — U0003: CPT

## 2022-09-28 PROCEDURE — 85025 COMPLETE CBC W/AUTO DIFF WBC: CPT

## 2022-09-28 PROCEDURE — 83036 HEMOGLOBIN GLYCOSYLATED A1C: CPT

## 2022-09-28 PROCEDURE — 93306 TTE W/DOPPLER COMPLETE: CPT

## 2022-09-28 PROCEDURE — 80048 BASIC METABOLIC PNL TOTAL CA: CPT

## 2022-09-28 PROCEDURE — 72170 X-RAY EXAM OF PELVIS: CPT

## 2022-09-28 PROCEDURE — 73502 X-RAY EXAM HIP UNI 2-3 VIEWS: CPT

## 2022-09-28 PROCEDURE — 72125 CT NECK SPINE W/O DYE: CPT | Mod: ME

## 2022-09-28 PROCEDURE — 80053 COMPREHEN METABOLIC PANEL: CPT

## 2022-09-28 PROCEDURE — 97161 PT EVAL LOW COMPLEX 20 MIN: CPT

## 2022-09-28 PROCEDURE — 80061 LIPID PANEL: CPT

## 2022-09-28 PROCEDURE — 80162 ASSAY OF DIGOXIN TOTAL: CPT

## 2022-09-28 PROCEDURE — 70450 CT HEAD/BRAIN W/O DYE: CPT | Mod: ME

## 2022-09-28 RX ORDER — VALPROIC ACID (AS SODIUM SALT) 250 MG/5ML
5 SOLUTION, ORAL ORAL
Qty: 0 | Refills: 0 | DISCHARGE

## 2022-09-28 RX ADMIN — NYSTATIN CREAM 1 APPLICATION(S): 100000 CREAM TOPICAL at 14:06

## 2022-09-28 RX ADMIN — Medication 1 TABLET(S): at 12:40

## 2022-09-28 RX ADMIN — APIXABAN 2.5 MILLIGRAM(S): 2.5 TABLET, FILM COATED ORAL at 10:15

## 2022-09-28 RX ADMIN — CHLORHEXIDINE GLUCONATE 15 MILLILITER(S): 213 SOLUTION TOPICAL at 17:46

## 2022-09-28 RX ADMIN — Medication 250 MILLIGRAM(S): at 12:40

## 2022-09-28 RX ADMIN — NYSTATIN CREAM 1 APPLICATION(S): 100000 CREAM TOPICAL at 06:04

## 2022-09-28 RX ADMIN — CHLORHEXIDINE GLUCONATE 15 MILLILITER(S): 213 SOLUTION TOPICAL at 10:16

## 2022-09-28 RX ADMIN — Medication 500 MILLIGRAM(S): at 12:40

## 2022-09-28 RX ADMIN — Medication 12.5 MILLIGRAM(S): at 06:04

## 2022-09-28 RX ADMIN — NYSTATIN CREAM 1 APPLICATION(S): 100000 CREAM TOPICAL at 17:46

## 2022-09-28 RX ADMIN — MONTELUKAST 10 MILLIGRAM(S): 4 TABLET, CHEWABLE ORAL at 12:40

## 2022-09-28 RX ADMIN — Medication 1000 UNIT(S): at 12:40

## 2022-09-28 RX ADMIN — Medication 12.5 MILLIGRAM(S): at 17:46

## 2022-09-28 RX ADMIN — Medication 125 MICROGRAM(S): at 06:05

## 2022-09-28 NOTE — PROGRESS NOTE ADULT - SUBJECTIVE AND OBJECTIVE BOX
Neurology Progress Note    S: Patient seen and examined. No new events overnight. doing okay     Medication:  MEDICATIONS  (STANDING):  apixaban 2.5 milliGRAM(s) Oral every 12 hours  ascorbic acid 500 milliGRAM(s) Oral daily  chlorhexidine 0.12% Liquid 15 milliLiter(s) Oral Mucosa two times a day  cholecalciferol 1000 Unit(s) Oral daily  digoxin     Tablet 125 MICROGram(s) Oral daily  metoprolol tartrate 12.5 milliGRAM(s) Oral two times a day  montelukast 10 milliGRAM(s) Oral daily  multivitamin 1 Tablet(s) Oral daily  nystatin Powder 1 Application(s) Topical two times a day  nystatin Powder 1 Application(s) Topical three times a day  simvastatin 40 milliGRAM(s) Oral at bedtime  valproic  acid Syrup 250 milliGRAM(s) Oral daily    MEDICATIONS  (PRN):      Vitals:       Vital Signs Last 24 Hrs  T(C): 36.7 (09-28-22 @ 08:25), Max: 36.9 (09-28-22 @ 00:36)  T(F): 98.1 (09-28-22 @ 08:25), Max: 98.5 (09-28-22 @ 00:36)  HR: 81 (09-28-22 @ 08:25) (69 - 86)  BP: 115/71 (09-28-22 @ 08:25) (105/70 - 123/80)  BP(mean): --  RR: 18 (09-28-22 @ 08:25) (18 - 18)  SpO2: 97% (09-28-22 @ 08:25) (97% - 100%)              General Exam:   General Appearance: Appropriately dressed and in no acute distress       Head: Normocephalic, atraumatic and no dysmorphic features  Ear, Nose, and Throat: Moist mucous membranes  CVS: S1S2+  Resp: No SOB, no wheeze or rhonchi  GI: soft NT/ND  Extremities: No edema or cyanosis  Skin: No bruises or rashes     Neurological Exam:  Mental Status: Awake, alert not following commands, minimal/no verbal   Cranial Nerves: PERRL, EOMI, VFFC, sensation V1-V3 intact, R facial  , equal elevation of palate, scm/trap 5/5, tongue is midline on protrusion. no obvious papilledema on fundoscopic exam. hearing is grossly intact.   Motor: RUE 0/5 contracter RLE 2/5; moving L antigravity   Sensation: withdraws L>R   Reflexes: 1+ throughout at biceps, brachioradialis, triceps, patellars and ankles bilaterally and equal. No clonus. R toe and L toe were both downgoing.  Coordination: unable   Gait: unable     I personally reviewed the below data/images/labs:  CBC Full  -  ( 27 Sep 2022 07:18 )  WBC Count : 8.02 K/uL  RBC Count : 4.57 M/uL  Hemoglobin : 14.6 g/dL  Hematocrit : 44.6 %  Platelet Count - Automated : 245 K/uL  Mean Cell Volume : 97.6 fl  Mean Cell Hemoglobin : 31.9 pg  Mean Cell Hemoglobin Concentration : 32.7 gm/dL  Auto Neutrophil # : 4.85 K/uL  Auto Lymphocyte # : 1.88 K/uL  Auto Monocyte # : 0.83 K/uL  Auto Eosinophil # : 0.37 K/uL  Auto Basophil # : 0.07 K/uL  Auto Neutrophil % : 60.6 %  Auto Lymphocyte % : 23.4 %  Auto Monocyte % : 10.3 %  Auto Eosinophil % : 4.6 %  Auto Basophil % : 0.9 %    no new labs     09-26    139  |  104  |  20  ----------------------------<  127<H>  4.3   |  24  |  0.46<L>    Ca    9.6      26 Sep 2022 11:55  Mg     2.2     09-26            ACC: [id number] EXAM:  CT CERVICAL SPINE                        ACC: [id number] EXAM:  CT BRAIN                          PROCEDURE DATE:  09/19/2022          INTERPRETATION:  CLINICAL INFORMATION: Unwitnessed fall on Eliquis. Found   down with head andneck trauma.    TECHNIQUE: CT of the head was performed in multiple axial sections from   the base of the skull to the vertex with coronal and sagittal reformats.    CT of the cervical spine was performed in bone and soft tissue windows   with coronal and sagittal reformats. No intravenous contrast was   administered. Beam hardening artifact slightly obscures evaluation of the   posterior fossa and brainstem.    COMPARISON: CT of the head from 3/24/2017.    FINDINGS:    Head:  Parenchymal volume loss is noted with prominent ventricles and sulci.   Chronic microvascular ischemic changes are identified. Gliosis and   encephalomalacia is again seen in the left frontal and parietal regions   with ex vacuo dilatation of the left lateral ventricle likely a sequela   of prior infarction. No acute territorial infarct is demonstrated.    There is no evidence of an acute hemorrhage or mass-effect in the   posterior fossa or in the supratentorial region. Senescent bilateral   basal ganglia calcifications are noted.    Evaluation of the osseous structures with the appropriate window appears   unremarkable. Sequela of bilateral lens surgery is seen. The visualized   paranasal sinuses and mastoid air cells are clear.    Cervical spine:  Bones: The cervical vertebral body heights and alignment are maintained.   No fracture or spondylolisthesis is demonstrated. Mild disc space   narrowing is seen at C5-C6 and C6-C7. Small marginal osteophytes are   noted from C4 to C7. Multilevel posterior disc osteophyte complexes are   seen without evidence of severe spinal canal stenosis. Multilevel neural   foraminal stenosis is noted.    Soft tissues: The prevertebral soft tissues are unremarkable. The thyroid   is heterogeneously enlarged possibly due to a goiter. A surgical clip is   seen in the posterior left upper chest soft tissues.    Lung apices: Biapical scarring is noted.    IMPRESSION:  1. No acute intracranial hemorrhage, territorial infarct, mass effect or   calvarial fracture.  2. Multilevel degenerative changes of the cervical spine without evidence   of a fracture.    --- End of Report ---            MARLENI LEVI MD; Attending Radiologist  This document has been electronically signed. Sep 19 2022  2:52AM    < end of copied text >    < from: Transthoracic Echocardiogram (09.21.22 @ 11:06) >    Patient name: CLARISA DAWN  YOB: 1935   Age: 87 (F)   MR#: [id number]  Study Date: 9/21/2022  [facility]ae[facility] CAR[facility]  Study quality: difficult due to patient being  Referring Physician: Carter Lowry MD  Blood Pressure: 121/78 mmHg  Height: 152 cm  Weight: 47 kg  BSA: 1.4 m2  ------------------------------------------------------------------------  PROCEDURE: Transthoracic echocardiogram with 2-D, M-Mode  and complete spectral and color flow Doppler.  INDICATION: Syncope and collapse (R55)  ------------------------------------------------------------------------  Dimensions:    Normal Values:  LA:     4.4    2.0 - 4.0 cm  Ao:     3.1    2.0 - 3.8 cm  SEPTUM: 0.9    0.6 - 1.2 cm  PWT:    0.9    0.6 -1.1 cm  LVIDd:  4.0    3.0 - 5.6 cm  LVIDs:  2.3    1.8 - 4.0 cm  Derived variables:  LVMI: 77 g/m2  RWT: 0.45  Fractional short: 43 %  EF (Visual Estimate): 65-70 %  ------------------------------------------------------------------------  Observations:  Mitral Valve: Mitral annular calcification, otherwise  normal mitral valve.  Aortic Valve/Aorta: Calcified trileaflet aortic valve with  normal opening. Unable to obtain gradients across the  aortic valve.  Mild aortic regurgitation.  Aortic Root: 3.1 cm.  Left Ventricle: Normal left ventricular systolic function.  No segmental wall motion abnormalities. Interpretation  based on parasternal views only.  Normal left ventricular  internal dimensions and wall thicknesses. Unable to  evaluate diastolic function  Right Heart: The right ventricle is not well visualized.  Normal tricuspid valve. Minimal tricuspid regurgitation.  Normal pulmonic valve. Mild pulmonic regurgitation.  Pericardium/Pleura: Normal pericardium with no pericardial  effusion.  ------------------------------------------------------------------------  Conclusions:  1. Calcified trileaflet aortic valve with normal opening.  Unable to obtain gradients across the aortic valve.  Mild  aortic regurgitation.  2. Normal left ventricular systolic function. No segmental  wall motion abnormalities. Interpretation based on  parasternal views only.  TTE study terminated early at the request of the patient.  *** No previous Echo exam.  ------------------------------------------------------------------------  Confirmed on  9/21/2022 - 14:31:23 by Denny Scott M.D.  ------------------------------------------------------------------------    < end of copied text >  Orthostatic VS

## 2022-09-28 NOTE — DISCHARGE NOTE NURSING/CASE MANAGEMENT/SOCIAL WORK - NSDCPEFALRISK_GEN_ALL_CORE
For information on Fall & Injury Prevention, visit: [email or web address] OR  [email or web address] OR  https://www.cdc.gov/steadi/patient.html

## 2022-09-28 NOTE — PROGRESS NOTE ADULT - NSPROGADDITIONALINFOA_GEN_ALL_CORE
- Counseling on diet, exercise, and medication adherence was done  - Counseling on smoking cessation and alcohol consumption offered when appropriate.  - Pain assessed and judicious use of narcotics when appropriate was discussed.    - Stroke education given when appropriate.  - Importance of fall prevention discussed.   - Differential diagnosis and plan of care discussed with patient and/or family and primary team
d/w RN and NP.  d/w CM.     --- Coverage for Dr. Lowry ---  - Dr. VENUS Cody ([facility])  - [phone]
- Counseling on diet, exercise, and medication adherence was done  - Counseling on smoking cessation and alcohol consumption offered when appropriate.  - Pain assessed and judicious use of narcotics when appropriate was discussed.    - Stroke education given when appropriate.  - Importance of fall prevention discussed.   - Differential diagnosis and plan of care discussed with patient and/or family and primary team
d/w GEORGE and EILEEN Reeves.    --- Coverage for Dr. Lowry ---  - Dr. VENUS Cody ([facility])  - [phone]
d/w RN and NP.    --- Coverage for Dr. Lowry ---  - Dr. VENUS Cody ([facility])  - [phone]

## 2022-09-28 NOTE — PROGRESS NOTE ADULT - SUBJECTIVE AND OBJECTIVE BOX
SUBJECTIVE/ OVERNIGHT EVENTS:  --- Coverage for Dr. Lowry ---  feels well  denied pain  uncooperative with CT abd  suspicion for PEG site infection/abscess is low.  nontender, no skin breakdown. no leukocytosis, no fever.  tolerating tube feeding  okay to cancel CT abd.  dc planning today.    --------------------------------------------------------------------------------------------  LABS:                        14.6   8.02  )-----------( 245      ( 27 Sep 2022 07:18 )             44.6     09-26    139  |  104  |  20  ----------------------------<  127<H>  4.3   |  24  |  0.46<L>    Ca    9.6      26 Sep 2022 11:55  Mg     2.2     09-26        CAPILLARY BLOOD GLUCOSE      POCT Blood Glucose.: 128 mg/dL (28 Sep 2022 06:03)  POCT Blood Glucose.: 122 mg/dL (28 Sep 2022 00:20)  POCT Blood Glucose.: 100 mg/dL (27 Sep 2022 17:32)  POCT Blood Glucose.: 135 mg/dL (27 Sep 2022 11:50)            RADIOLOGY & ADDITIONAL TESTS:    Imaging Personally Reviewed:  [x] YES  [ ] NO    Consultant(s) Notes Reviewed:  [x] YES  [ ] NO    MEDICATIONS  (STANDING):  apixaban 2.5 milliGRAM(s) Oral every 12 hours  ascorbic acid 500 milliGRAM(s) Oral daily  chlorhexidine 0.12% Liquid 15 milliLiter(s) Oral Mucosa two times a day  cholecalciferol 1000 Unit(s) Oral daily  digoxin     Tablet 125 MICROGram(s) Oral daily  metoprolol tartrate 12.5 milliGRAM(s) Oral two times a day  montelukast 10 milliGRAM(s) Oral daily  multivitamin 1 Tablet(s) Oral daily  nystatin Powder 1 Application(s) Topical two times a day  nystatin Powder 1 Application(s) Topical three times a day  simvastatin 40 milliGRAM(s) Oral at bedtime  valproic  acid Syrup 250 milliGRAM(s) Oral daily    MEDICATIONS  (PRN):      Care Discussed with Consultants/Other Providers [x] YES  [ ] NO    Vital Signs Last 24 Hrs  T(C): 36.7 (28 Sep 2022 08:25), Max: 36.9 (28 Sep 2022 00:36)  T(F): 98.1 (28 Sep 2022 08:25), Max: 98.5 (28 Sep 2022 00:36)  HR: 81 (28 Sep 2022 08:25) (69 - 86)  BP: 115/71 (28 Sep 2022 08:25) (105/70 - 123/80)  BP(mean): --  RR: 18 (28 Sep 2022 08:25) (18 - 18)  SpO2: 97% (28 Sep 2022 08:25) (97% - 100%)    Parameters below as of 28 Sep 2022 08:25  Patient On (Oxygen Delivery Method): room air      I&O's Summary      PHYSICAL EXAM:  GENERAL: NAD, thin-elderly, comfortable  HEAD:  Atraumatic, Normocephalic  EYES: EOMI, PERRLA, conjunctiva and sclera clear  NECK: Supple, No JVD  CHEST/LUNG: mild decrease breath sounds bilaterally; No wheeze   HEART: Regular rate and rhythm; No murmurs, rubs, or gallops  ABDOMEN: Soft, Nontender, Nondistended; Bowel sounds present, PEG in place, dressing d/c/i, no pus. loose bumper with underlying skin mild erythema, improved. nontendern. no skin breakdown.   Neuro: Awake, advanced dementia.  EXTREMITIES:  2+ Peripheral Pulses, No clubbing, cyanosis, or edema  SKIN: No rashes or lesions

## 2022-09-28 NOTE — DISCHARGE NOTE NURSING/CASE MANAGEMENT/SOCIAL WORK - PATIENT PORTAL LINK FT
You can access the FollowMyHealth Patient Portal offered by [facility] by registering at the following website: [email or web address]. By joining [facility]’s FollowMyHealth portal, you will also be able to view your health information using other applications (apps) compatible with our system.

## 2022-09-28 NOTE — PROGRESS NOTE ADULT - SUBJECTIVE AND OBJECTIVE BOX
DATE OF SERVICE: 09-28-22 @ 17:29    Patient is a 87y old  Female who presents with a chief complaint of Covering for Dr. Rizo (27 Sep 2022 08:02)      INTERVAL HISTORY: No complaints    REVIEW OF SYSTEMS:  CONSTITUTIONAL: No weakness  EYES/ENT: No visual changes;  No throat pain   NECK: No pain or stiffness  RESPIRATORY: No cough, wheezing; No shortness of breath  CARDIOVASCULAR: No chest pain or palpitations  GASTROINTESTINAL: No abdominal  pain. No nausea, vomiting, or hematemesis  GENITOURINARY: No dysuria, frequency or hematuria  NEUROLOGICAL: No stroke like symptoms  SKIN: No rashes    	  MEDICATIONS:  digoxin     Tablet 125 MICROGram(s) Oral daily  metoprolol tartrate 12.5 milliGRAM(s) Oral two times a day        PHYSICAL EXAM:  T(C): 36.8 (09-28-22 @ 15:52), Max: 36.9 (09-28-22 @ 00:36)  HR: 73 (09-28-22 @ 15:52) (73 - 86)  BP: 120/69 (09-28-22 @ 15:52) (115/71 - 123/80)  RR: 18 (09-28-22 @ 15:52) (18 - 18)  SpO2: 97% (09-28-22 @ 15:52) (97% - 97%)  Wt(kg): --  I&O's Summary        Appearance: In no distress	  HEENT:    PERRL, EOMI	  Cardiovascular:  S1 S2, No JVD  Respiratory: Lungs clear to auscultation	  Gastrointestinal:  Soft, Non-tender, + BS	  Vascularature:  No edema of LE  Psychiatric: Appropriate affect   Neuro: no acute focal deficits                               14.6   8.02  )-----------( 245      ( 27 Sep 2022 07:18 )             44.6               Labs personally reviewed      ASSESSMENT/PLAN: 	  87 year old Female with a PMHx of CVA with residual R sided weakness, dementia complicated by dysphagia s/p PEG placent, nonverbal, chronic atrial fibrillation presenting s/p fall. Patient was found down from chair. Per son Stas, unknown if patient lost consciousness or head trauma. History obtained from Chacho Mcclelland via telephone and from chart. Patient is non-verbal at baseline.    Problem/Plan #1  Problem: s/p Fall  Plan: r/o syncope  - EKG at baseline with no ischemic changes noted  - CT Head with no acute intracranial hemorrhage infarct, mass or fracture  - TTE shows preserved EF, normal LV systolic function, no WMA  - Monitor on tele- currently SR  - PT consult    Problem/Plan #2  Problem: Chronic Atrial Fibrillation  Plan: c/w Digoxin  - c/w Apixaban 2.5mg PO BID  - c/w Metoprolol 12.5mg PO BID    Problem/Plan #3  Problem: CVA  Plan:  - PT consult  - s/p PEG: c/w tube feedings    Problem/Plan #4  Problem: DVT PPx  Plan: c/w SQ Heparin              CJ Neumann DO [facility]  Cardiovascular Medicine  [address] [place] [address], [address]  Office: [phone]  Cell: [phone]

## 2022-09-28 NOTE — PROGRESS NOTE ADULT - ASSESSMENT
87 year old  Female with a  prior stroke with residual R sided weakness, chronic AF dementia complicated by dysphagia s/p PEG, nonverbal, chronic atrial fibrillation presenting s/p fall.    CTH: volume loss. L frontal and pareital gliosis   CT C spine: no fracture. multilevel degenerative ichagnes   UA+  TTE as above     A1c 5.6  TSH 2.05      Impression: 1) Chronic Stroke embolic stroke likely from AF 2) fall unclear etiology   - rule out infection. f/u urine cx ; ct abd pelvis   - should be on AC daily and statin therapy (on simva 40) for secondary stroke prevention if no CI   - unclear why was not on AC; should at least be on asa if unable for AC --> eliquis 2.5mg BID started   - cardiac workup in progress for syncope.  tele  - can check B12, RPR, TSH(WNL)  - unclear if LOC.  can get rEEG in or outpatient  - check orthostatics    - PT/OT   - check FS, glucose control <180  - GI/DVT ppx  - Thank you for allowing me to participate in the care of this patient. Call with questions.   - alex planning   Corona Francois MD  Vascular Neurology  Office: [phone]

## 2022-09-28 NOTE — PROGRESS NOTE ADULT - PROVIDER SPECIALTY LIST ADULT
Cardiology
Cardiology
Internal Medicine
Internal Medicine
Neurology
Neurology
Cardiology
Internal Medicine
Internal Medicine
Cardiology
Internal Medicine
Neurology

## 2022-09-28 NOTE — PROGRESS NOTE ADULT - ASSESSMENT
87 year old Female with a PMHx of CVA with residual R sided weakness, dementia complicated by dysphagia s/p PEG, nonverbal, chronic atrial fibrillation off eliquis, presenting s/p fall. Patient was found down @9PM from the chair that she was placed on at 6PM. Unknown LOC or head trauma. Patient's son at bedside states unchanged mental status, but having decreased ability to walk 2/2 possible weakness. Patient unable to verbalize pain anywhere.    Mechanical Fall  - CT Head negative for acute findings  - CT Cervical spine negative for acute fracture  - Xrays --  negative for acute fractures    - PT/OT Consult  - TTE -- As noted wit EF of 65-70%  - Check Orthostatics if able. mostly bedbound and uncoorperative.   - Fall and Aspiration precautions  - Neuro consulted; F/u recs  - Pt will require outpatient follow up with Neuro as an outpatient for rEEG     Abnormal UA (though unimpressive, no leuke, no nitrite)  - Urine culture --> negative   - stable off abx.   - can repeat outpt UA.   - Monitor CBC, Temp curve, VS and patient closely     CVA  - With R sided residual weakness  - CT head negative for acute findings  - C/w PEG tube feedings, tolerating    Afib  - C/w Digoxin   - C/w Zocor   - Have asked Pharmacy at [phone] to follow up with Med rec  - C/w Low dose Eliquis 2.5 BID, Patient is bedbound; low risk for recurrent fall   - C/w lopressor 12.5 BID; monitor patient closely    Dementia  - Fall and Aspiration precautions  - SonStas unsure of patient is on VPA or Trazadone. Have asked pharmacy to obtain Med rec    PPX  - PPI via PEG and Elqiuis 2.5 BID     Brief NSVT overnight, Dig level nontoxic and electrolytes normal. c/w current regimen per card.     Feeding tube check:  RN report feeding tube site smell and loose bumper  no open wounds. GI eval appreciated. last PEG exchanged back in May 2022.  Initial plan for CT abd, however, uncooperative with CT abd. radiology sent her back.   Low suspicion for PEG site infection/abscess given nontender, no skin breakdown. no leukocytosis, no fever.  tolerating tube feeding  okay to cancel CT abd at this point.  dc planning today.    D/C planning underway awaiting placement/ authorization for rehab.

## 2022-11-15 NOTE — PATIENT PROFILE ADULT - LANGUAGE ASSISTANCE NEEDED
No-Patient/Caregiver offered and refused free interpretation services. Normal vision: sees adequately in most situations; can see medication labels, newsprint

## 2023-01-05 NOTE — DIETITIAN INITIAL EVALUATION ADULT. - NUTRITION DIAGNOSIS
Shane Bradford RN   no...

## 2023-01-09 NOTE — H&P ADULT - ENMT
No oral lesions; no gross abnormalities Dorsal Nasal Flap Text: The defect edges were debeveled with a #15 scalpel blade.  Given the location of the defect and the proximity to free margins a dorsal nasal flap was deemed most appropriate.  Using a sterile surgical marker, an appropriate dorsal nasal flap was drawn around the defect.    The area thus outlined was incised deep to adipose tissue with a #15 scalpel blade.  The skin margins were undermined to an appropriate distance in all directions utilizing iris scissors.

## 2023-08-24 NOTE — DISCHARGE NOTE ADULT - BECAUSE OF A PHYSICAL, MENTAL OR EMOTIONAL CONDITION, DO YOU HAVE DIFFICULTY DOING  ERRANDS ALONE LIKE VISITING A DOCTOR'S OFFICE OR SHOPPING (15 YEARS AND OLDER)
Pt no show'd for appt today  This is her 1st no show for the practice  When I tried to call her, someone picked up the phone and immediately hung up without even speaking  Yes Composite Graft Text: The defect edges were debeveled with a #15 scalpel blade. Given the location of the defect, shape of the defect, the proximity to free margins and the fact the defect was full thickness a composite graft was deemed most appropriate.  The defect was outline and then transferred to the donor site.  A full thickness graft was then excised from the donor site. The graft was then placed in the primary defect, oriented appropriately and then sutured into place.  The secondary defect was then repaired using a primary closure.

## 2023-08-30 NOTE — CONSULT NOTE ADULT - CONSULT REASON
PEG tube replacement
right foot screw protruding 2nd toe
Chroni Toe  OM
[employer] Counseling:  I discussed with the patient the risks of mycophenolate mofetil including but not limited to infection/immunosuppression, GI upset, hypokalemia, hypercholesterolemia, bone marrow suppression, lymphoproliferative disorders, malignancy, GI ulceration/bleed/perforation, colitis, interstitial lung disease, kidney failure, progressive multifocal leukoencephalopathy, and birth defects.  The patient understands that monitoring is required including a baseline creatinine and regular CBC testing. In addition, patient must alert us immediately if symptoms of infection or other concerning signs are noted.

## 2023-09-15 NOTE — H&P ADULT - NSICDXNOFAMILYHX_GEN_ALL_CORE
<-- Click to add NO pertinent Family History Island Pedicle Flap With Canthal Suspension Text: The defect edges were debeveled with a #15 scalpel blade.  Given the location of the defect, shape of the defect and the proximity to free margins an island pedicle advancement flap was deemed most appropriate.  Using a sterile surgical marker, an appropriate advancement flap was drawn incorporating the defect, outlining the appropriate donor tissue and placing the expected incisions within the relaxed skin tension lines where possible. The area thus outlined was incised deep to adipose tissue with a #15 scalpel blade.  The skin margins were undermined to an appropriate distance in all directions around the primary defect and laterally outward around the island pedicle utilizing iris scissors.  There was minimal undermining beneath the pedicle flap. A suspension suture was placed in the canthal tendon to prevent tension and prevent ectropion.

## 2025-05-05 NOTE — H&P ADULT - NSHPPHYSICALEXAM_GEN_ALL_CORE
Palliative Care Interim Summary  Current cares continue as per SICU team notes.  Per discussion with SICU fellow, possible repeat care conference on Thursday of this week.  Our  continues to offer support to mother.  We would be happy to join you for follow-up care conference.    Brendon Fairbanks MD  Palliative Medicine Consult Team  Text me via [employer]     ICU Vital Signs Last 24 Hrs  T(C): 36.8 (19 May 2019 14:21), Max: 36.8 (19 May 2019 14:21)  T(F): 98.2 (19 May 2019 14:21), Max: 98.2 (19 May 2019 14:21)  HR: 81 (19 May 2019 14:21) (74 - 81)  BP: 147/78 (19 May 2019 14:21) (140/85 - 147/78)  BP(mean): --  ABP: --  ABP(mean): --  RR: 16 (19 May 2019 14:21) (16 - 19)  SpO2: 97% (19 May 2019 14:21) (97% - 97%)